# Patient Record
Sex: FEMALE | Race: WHITE | NOT HISPANIC OR LATINO | Employment: OTHER | URBAN - METROPOLITAN AREA
[De-identification: names, ages, dates, MRNs, and addresses within clinical notes are randomized per-mention and may not be internally consistent; named-entity substitution may affect disease eponyms.]

---

## 2023-03-18 LAB
ANION GAP IN SER/PLAS: 15 MMOL/L (ref 10–20)
CALCIUM (MG/DL) IN SER/PLAS: 10.1 MG/DL (ref 8.6–10.6)
CARBON DIOXIDE, TOTAL (MMOL/L) IN SER/PLAS: 28 MMOL/L (ref 21–32)
CHLORIDE (MMOL/L) IN SER/PLAS: 102 MMOL/L (ref 98–107)
CREATININE (MG/DL) IN SER/PLAS: 1.18 MG/DL (ref 0.5–1.05)
GFR FEMALE: 46 ML/MIN/1.73M2
GLUCOSE (MG/DL) IN SER/PLAS: 93 MG/DL (ref 74–99)
POTASSIUM (MMOL/L) IN SER/PLAS: 4.9 MMOL/L (ref 3.5–5.3)
SODIUM (MMOL/L) IN SER/PLAS: 140 MMOL/L (ref 136–145)
UREA NITROGEN (MG/DL) IN SER/PLAS: 18 MG/DL (ref 6–23)

## 2023-04-14 LAB
ALBUMIN (G/DL) IN SER/PLAS: 4.7 G/DL (ref 3.4–5)
ANION GAP IN SER/PLAS: 14 MMOL/L (ref 10–20)
BASOPHILS (10*3/UL) IN BLOOD BY AUTOMATED COUNT: 0.03 X10E9/L (ref 0–0.1)
BASOPHILS/100 LEUKOCYTES IN BLOOD BY AUTOMATED COUNT: 0.5 % (ref 0–2)
CALCIUM (MG/DL) IN SER/PLAS: 10.2 MG/DL (ref 8.6–10.6)
CARBON DIOXIDE, TOTAL (MMOL/L) IN SER/PLAS: 27 MMOL/L (ref 21–32)
CHLORIDE (MMOL/L) IN SER/PLAS: 105 MMOL/L (ref 98–107)
CREATININE (MG/DL) IN SER/PLAS: 1.18 MG/DL (ref 0.5–1.05)
EOSINOPHILS (10*3/UL) IN BLOOD BY AUTOMATED COUNT: 0.19 X10E9/L (ref 0–0.4)
EOSINOPHILS/100 LEUKOCYTES IN BLOOD BY AUTOMATED COUNT: 3.4 % (ref 0–6)
ERYTHROCYTE DISTRIBUTION WIDTH (RATIO) BY AUTOMATED COUNT: 13.2 % (ref 11.5–14.5)
ERYTHROCYTE MEAN CORPUSCULAR HEMOGLOBIN CONCENTRATION (G/DL) BY AUTOMATED: 31.4 G/DL (ref 32–36)
ERYTHROCYTE MEAN CORPUSCULAR VOLUME (FL) BY AUTOMATED COUNT: 93 FL (ref 80–100)
ERYTHROCYTES (10*6/UL) IN BLOOD BY AUTOMATED COUNT: 4.45 X10E12/L (ref 4–5.2)
GFR FEMALE: 46 ML/MIN/1.73M2
GLUCOSE (MG/DL) IN SER/PLAS: 100 MG/DL (ref 74–99)
HEMATOCRIT (%) IN BLOOD BY AUTOMATED COUNT: 41.4 % (ref 36–46)
HEMOGLOBIN (G/DL) IN BLOOD: 13 G/DL (ref 12–16)
IMMATURE GRANULOCYTES/100 LEUKOCYTES IN BLOOD BY AUTOMATED COUNT: 0.2 % (ref 0–0.9)
INR IN PPP BY COAGULATION ASSAY: 1 (ref 0.9–1.1)
LEUKOCYTES (10*3/UL) IN BLOOD BY AUTOMATED COUNT: 5.5 X10E9/L (ref 4.4–11.3)
LYMPHOCYTES (10*3/UL) IN BLOOD BY AUTOMATED COUNT: 1.48 X10E9/L (ref 0.8–3)
LYMPHOCYTES/100 LEUKOCYTES IN BLOOD BY AUTOMATED COUNT: 26.8 % (ref 13–44)
MONOCYTES (10*3/UL) IN BLOOD BY AUTOMATED COUNT: 0.39 X10E9/L (ref 0.05–0.8)
MONOCYTES/100 LEUKOCYTES IN BLOOD BY AUTOMATED COUNT: 7.1 % (ref 2–10)
NATRIURETIC PEPTIDE B (PG/ML) IN SER/PLAS: 42 PG/ML (ref 0–99)
NEUTROPHILS (10*3/UL) IN BLOOD BY AUTOMATED COUNT: 3.43 X10E9/L (ref 1.6–5.5)
NEUTROPHILS/100 LEUKOCYTES IN BLOOD BY AUTOMATED COUNT: 62 % (ref 40–80)
NRBC (PER 100 WBCS) BY AUTOMATED COUNT: 0 /100 WBC (ref 0–0)
PLATELETS (10*3/UL) IN BLOOD AUTOMATED COUNT: 340 X10E9/L (ref 150–450)
POTASSIUM (MMOL/L) IN SER/PLAS: 4.8 MMOL/L (ref 3.5–5.3)
PROTHROMBIN TIME (PT) IN PPP BY COAGULATION ASSAY: 11.3 SEC (ref 9.8–13.4)
SODIUM (MMOL/L) IN SER/PLAS: 141 MMOL/L (ref 136–145)
UREA NITROGEN (MG/DL) IN SER/PLAS: 15 MG/DL (ref 6–23)

## 2023-08-07 ENCOUNTER — OFFICE VISIT (OUTPATIENT)
Dept: PRIMARY CARE | Facility: CLINIC | Age: 83
End: 2023-08-07
Payer: MEDICARE

## 2023-08-07 VITALS
WEIGHT: 197 LBS | DIASTOLIC BLOOD PRESSURE: 66 MMHG | HEART RATE: 77 BPM | BODY MASS INDEX: 33.81 KG/M2 | OXYGEN SATURATION: 97 % | SYSTOLIC BLOOD PRESSURE: 120 MMHG

## 2023-08-07 DIAGNOSIS — I20.9 ANGINA PECTORIS (CMS-HCC): ICD-10-CM

## 2023-08-07 DIAGNOSIS — R10.30 LOWER ABDOMINAL PAIN: ICD-10-CM

## 2023-08-07 DIAGNOSIS — M35.9 CONNECTIVE TISSUE DISEASE (MULTI): ICD-10-CM

## 2023-08-07 DIAGNOSIS — K57.32 DIVERTICULITIS OF LARGE INTESTINE WITHOUT PERFORATION OR ABSCESS WITHOUT BLEEDING: Primary | ICD-10-CM

## 2023-08-07 PROBLEM — R10.13 CHRONIC EPIGASTRIC PAIN: Status: ACTIVE | Noted: 2023-08-07

## 2023-08-07 PROBLEM — I10 BENIGN ESSENTIAL HYPERTENSION: Status: ACTIVE | Noted: 2023-08-07

## 2023-08-07 PROBLEM — I35.0 AORTIC STENOSIS: Status: ACTIVE | Noted: 2023-08-07

## 2023-08-07 PROBLEM — I25.10 CAD (CORONARY ARTERY DISEASE): Status: ACTIVE | Noted: 2023-08-07

## 2023-08-07 PROBLEM — F41.1 GENERALIZED ANXIETY DISORDER: Status: ACTIVE | Noted: 2023-08-07

## 2023-08-07 PROBLEM — G51.31 HEMIFACIAL SPASM OF RIGHT SIDE OF FACE: Status: ACTIVE | Noted: 2023-06-29

## 2023-08-07 PROBLEM — K21.9 ESOPHAGEAL REFLUX: Status: ACTIVE | Noted: 2023-08-07

## 2023-08-07 PROBLEM — G89.29 CHRONIC EPIGASTRIC PAIN: Status: ACTIVE | Noted: 2023-08-07

## 2023-08-07 PROBLEM — K59.09 CHRONIC CONSTIPATION: Status: ACTIVE | Noted: 2023-08-07

## 2023-08-07 PROBLEM — M47.22 CERVICAL RADICULOPATHY DUE TO OSTEOARTHRITIS OF SPINE: Status: ACTIVE | Noted: 2023-08-07

## 2023-08-07 LAB
POC APPEARANCE, URINE: CLEAR
POC BILIRUBIN, URINE: NEGATIVE
POC BLOOD, URINE: NEGATIVE
POC COLOR, URINE: YELLOW
POC GLUCOSE, URINE: NEGATIVE MG/DL
POC KETONES, URINE: NEGATIVE MG/DL
POC LEUKOCYTES, URINE: NEGATIVE
POC NITRITE,URINE: NEGATIVE
POC PH, URINE: 6.5 PH
POC PROTEIN, URINE: NEGATIVE MG/DL
POC SPECIFIC GRAVITY, URINE: 1.02
POC UROBILINOGEN, URINE: 0.2 EU/DL

## 2023-08-07 PROCEDURE — 99214 OFFICE O/P EST MOD 30 MIN: CPT | Performed by: FAMILY MEDICINE

## 2023-08-07 PROCEDURE — 1036F TOBACCO NON-USER: CPT | Performed by: FAMILY MEDICINE

## 2023-08-07 PROCEDURE — 81003 URINALYSIS AUTO W/O SCOPE: CPT | Performed by: FAMILY MEDICINE

## 2023-08-07 PROCEDURE — 1160F RVW MEDS BY RX/DR IN RCRD: CPT | Performed by: FAMILY MEDICINE

## 2023-08-07 PROCEDURE — 1159F MED LIST DOCD IN RCRD: CPT | Performed by: FAMILY MEDICINE

## 2023-08-07 PROCEDURE — 1126F AMNT PAIN NOTED NONE PRSNT: CPT | Performed by: FAMILY MEDICINE

## 2023-08-07 PROCEDURE — 3074F SYST BP LT 130 MM HG: CPT | Performed by: FAMILY MEDICINE

## 2023-08-07 PROCEDURE — 3078F DIAST BP <80 MM HG: CPT | Performed by: FAMILY MEDICINE

## 2023-08-07 RX ORDER — NITROGLYCERIN 0.4 MG/1
0.4 TABLET SUBLINGUAL EVERY 5 MIN PRN
COMMUNITY
Start: 2016-08-16

## 2023-08-07 RX ORDER — METOPROLOL SUCCINATE 25 MG/1
25 TABLET, EXTENDED RELEASE ORAL DAILY
COMMUNITY
Start: 2023-05-22 | End: 2024-03-08 | Stop reason: SDUPTHER

## 2023-08-07 RX ORDER — CLOPIDOGREL BISULFATE 75 MG/1
TABLET ORAL
COMMUNITY
Start: 2023-04-28 | End: 2024-01-10 | Stop reason: HOSPADM

## 2023-08-07 RX ORDER — LATANOPROST 50 UG/ML
SOLUTION/ DROPS OPHTHALMIC
COMMUNITY
Start: 2016-10-26

## 2023-08-07 RX ORDER — ASPIRIN 81 MG/1
1 TABLET ORAL DAILY
COMMUNITY
Start: 2016-08-17 | End: 2024-01-15 | Stop reason: HOSPADM

## 2023-08-07 RX ORDER — EZETIMIBE 10 MG/1
10 TABLET ORAL NIGHTLY
COMMUNITY
Start: 2023-05-22 | End: 2024-02-16 | Stop reason: SDUPTHER

## 2023-08-07 RX ORDER — CHOLECALCIFEROL (VITAMIN D3) 25 MCG
1 TABLET ORAL DAILY
COMMUNITY

## 2023-08-07 RX ORDER — AMLODIPINE BESYLATE 2.5 MG/1
2.5 TABLET ORAL DAILY
COMMUNITY
Start: 2023-07-17 | End: 2023-12-11 | Stop reason: SDUPTHER

## 2023-08-07 RX ORDER — CIPROFLOXACIN 500 MG/1
500 TABLET ORAL 2 TIMES DAILY
Qty: 14 TABLET | Refills: 0 | Status: SHIPPED | OUTPATIENT
Start: 2023-08-07 | End: 2023-08-14

## 2023-08-07 RX ORDER — ROSUVASTATIN CALCIUM 5 MG/1
5 TABLET, COATED ORAL DAILY
COMMUNITY
Start: 2023-06-11 | End: 2024-03-15 | Stop reason: SDUPTHER

## 2023-08-07 RX ORDER — RANOLAZINE 500 MG/1
500 TABLET, EXTENDED RELEASE ORAL EVERY 12 HOURS
COMMUNITY
Start: 2023-07-23 | End: 2024-01-10 | Stop reason: HOSPADM

## 2023-08-07 ASSESSMENT — ENCOUNTER SYMPTOMS
UNEXPECTED WEIGHT CHANGE: 0
APPETITE CHANGE: 0
ABDOMINAL PAIN: 1
NAUSEA: 0

## 2023-08-28 ENCOUNTER — OFFICE VISIT (OUTPATIENT)
Dept: PRIMARY CARE | Facility: CLINIC | Age: 83
End: 2023-08-28
Payer: MEDICARE

## 2023-08-28 VITALS
WEIGHT: 189 LBS | OXYGEN SATURATION: 97 % | SYSTOLIC BLOOD PRESSURE: 136 MMHG | TEMPERATURE: 99.3 F | BODY MASS INDEX: 32.44 KG/M2 | DIASTOLIC BLOOD PRESSURE: 70 MMHG | HEART RATE: 91 BPM

## 2023-08-28 DIAGNOSIS — J01.00 ACUTE MAXILLARY SINUSITIS, RECURRENCE NOT SPECIFIED: ICD-10-CM

## 2023-08-28 DIAGNOSIS — I10 BENIGN ESSENTIAL HYPERTENSION: Primary | ICD-10-CM

## 2023-08-28 PROBLEM — H93.13 TINNITUS OF BOTH EARS: Status: ACTIVE | Noted: 2023-08-28

## 2023-08-28 PROBLEM — K52.832 LYMPHOCYTIC COLITIS: Status: ACTIVE | Noted: 2023-08-28

## 2023-08-28 PROBLEM — E78.2 COMBINED HYPERLIPIDEMIA: Status: ACTIVE | Noted: 2023-08-28

## 2023-08-28 PROCEDURE — 3078F DIAST BP <80 MM HG: CPT | Performed by: FAMILY MEDICINE

## 2023-08-28 PROCEDURE — 1036F TOBACCO NON-USER: CPT | Performed by: FAMILY MEDICINE

## 2023-08-28 PROCEDURE — 1160F RVW MEDS BY RX/DR IN RCRD: CPT | Performed by: FAMILY MEDICINE

## 2023-08-28 PROCEDURE — 1126F AMNT PAIN NOTED NONE PRSNT: CPT | Performed by: FAMILY MEDICINE

## 2023-08-28 PROCEDURE — 99213 OFFICE O/P EST LOW 20 MIN: CPT | Performed by: FAMILY MEDICINE

## 2023-08-28 PROCEDURE — 1159F MED LIST DOCD IN RCRD: CPT | Performed by: FAMILY MEDICINE

## 2023-08-28 PROCEDURE — 3075F SYST BP GE 130 - 139MM HG: CPT | Performed by: FAMILY MEDICINE

## 2023-08-28 RX ORDER — AZITHROMYCIN 250 MG/1
TABLET, FILM COATED ORAL
Qty: 6 TABLET | Refills: 0 | Status: SHIPPED | OUTPATIENT
Start: 2023-08-28 | End: 2023-09-01

## 2023-08-28 ASSESSMENT — ENCOUNTER SYMPTOMS
NAUSEA: 0
APPETITE CHANGE: 0
UNEXPECTED WEIGHT CHANGE: 0

## 2023-08-28 NOTE — PROGRESS NOTES
Subjective   Patient ID: Shanika Diaz is a 83 y.o. female who presents for Illness (Started Thurs with earaches, then ST and cough, just getting worse, chilled and then hot, taking Tylenol without relief cough is dry makes her feel like she is going to get sick to her stomach ).    Illness  Pertinent negatives include no nausea.   Sinus pressure ,drainage, sore throat ,dry cough ,no fever chills ,sick 5 days and not getting better    Review of Systems   Constitutional:  Negative for appetite change and unexpected weight change.   Eyes:  Negative for visual disturbance.   Gastrointestinal:  Negative for nausea.       Objective   /70   Pulse 91   Temp 37.4 °C (99.3 °F)   Wt 85.7 kg (189 lb)   SpO2 97%   BMI 32.44 kg/m²     Physical Exam  HENT:      Head: Normocephalic and atraumatic.      Nose: Nose normal.      Mouth/Throat:      Mouth: Mucous membranes are moist.      Pharynx: No oropharyngeal exudate.   Eyes:      Extraocular Movements: Extraocular movements intact.      Conjunctiva/sclera: Conjunctivae normal.      Pupils: Pupils are equal, round, and reactive to light.   Cardiovascular:      Rate and Rhythm: Normal rate and regular rhythm.   Pulmonary:      Effort: Pulmonary effort is normal.   Abdominal:      General: There is no distension.      Palpations: Abdomen is soft.   Musculoskeletal:      Cervical back: Normal range of motion and neck supple.   Lymphadenopathy:      Cervical: No cervical adenopathy.   Neurological:      General: No focal deficit present.      Mental Status: She is alert.   Psychiatric:         Attention and Perception: Attention normal.         Speech: Speech normal.         Behavior: Behavior is cooperative.         Assessment/Plan   Diagnoses and all orders for this visit:  Benign essential hypertension  Comments:  Treated and controlled  Acute maxillary sinusitis, recurrence not specified  -     azithromycin (Zithromax) 250 mg tablet; Take 2 tablets (500 mg) by mouth  once daily for 1 day, THEN 1 tablet (250 mg) once daily for 4 days.     is present  Recommend Mucinex twice daily for 5 days as well  Recheck 1 week if not better sooner if worse

## 2023-08-31 ENCOUNTER — TELEPHONE (OUTPATIENT)
Dept: PRIMARY CARE | Facility: CLINIC | Age: 83
End: 2023-08-31
Payer: MEDICARE

## 2023-08-31 NOTE — TELEPHONE ENCOUNTER
Pt was seen on 8/28 pt states she does not thing the Zpac is doing any good. Pt states the pain has spread to the ears neck and head. After taking mucinex the pain seems to get worse. Pt is asking what she should do next. Please advise [phone] Thai / Rod Dietz.

## 2023-08-31 NOTE — TELEPHONE ENCOUNTER
Pt. Notified encouraged rest and fluids also can use tylenol for pain and she has been Will keep us informed

## 2023-09-05 ENCOUNTER — TELEPHONE (OUTPATIENT)
Dept: PRIMARY CARE | Facility: CLINIC | Age: 83
End: 2023-09-05
Payer: MEDICARE

## 2023-09-05 DIAGNOSIS — H65.03 NON-RECURRENT ACUTE SEROUS OTITIS MEDIA OF BOTH EARS: Primary | ICD-10-CM

## 2023-09-05 RX ORDER — PREDNISONE 10 MG/1
TABLET ORAL
Qty: 14 TABLET | Refills: 0 | Status: SHIPPED | OUTPATIENT
Start: 2023-09-05 | End: 2023-09-18 | Stop reason: SDUPTHER

## 2023-09-05 NOTE — TELEPHONE ENCOUNTER
Pt states she was seen last week on the 28th and pt still has the blocked ears to the point where her equilibrium is off and she becomes sick to her stomach. Pt is asking if JEFF would like to see her again or can JEFF please send something in to pharmacy, [facility] [place]. Please advise pt. (Pt will be in with  at the 1045 time today)

## 2023-09-18 ENCOUNTER — TELEPHONE (OUTPATIENT)
Dept: PRIMARY CARE | Facility: CLINIC | Age: 83
End: 2023-09-18
Payer: MEDICARE

## 2023-09-18 DIAGNOSIS — H65.03 NON-RECURRENT ACUTE SEROUS OTITIS MEDIA OF BOTH EARS: ICD-10-CM

## 2023-09-18 NOTE — TELEPHONE ENCOUNTER
Discussed with pt. And she feels she needs something this has been dragging on for so long. Please send the prednisone to [facility] in [place]

## 2023-09-18 NOTE — TELEPHONE ENCOUNTER
PT states she still has blocked ears, congestion, dizziness, and a light cough. PT was advised to call if symptoms didn't get better by today. PT requesting a call back.

## 2023-09-19 RX ORDER — PREDNISONE 10 MG/1
TABLET ORAL
Qty: 14 TABLET | Refills: 0 | Status: SHIPPED | OUTPATIENT
Start: 2023-09-19 | End: 2023-09-26

## 2023-11-06 PROBLEM — E66.811 CLASS 1 OBESITY: Status: ACTIVE | Noted: 2019-09-10

## 2023-11-06 PROBLEM — R42 DIZZINESS: Status: ACTIVE | Noted: 2023-11-06

## 2023-11-06 PROBLEM — L85.1 ACQUIRED PLANTAR POROKERATOSIS: Status: ACTIVE | Noted: 2023-11-06

## 2023-11-06 PROBLEM — J01.00 ACUTE NON-RECURRENT MAXILLARY SINUSITIS: Status: ACTIVE | Noted: 2023-11-06

## 2023-11-06 PROBLEM — M79.604 BILATERAL LEG AND FOOT PAIN: Status: ACTIVE | Noted: 2023-11-06

## 2023-11-06 PROBLEM — R07.9 CHEST PAIN: Status: ACTIVE | Noted: 2023-11-06

## 2023-11-06 PROBLEM — R52 PAIN: Status: ACTIVE | Noted: 2023-11-06

## 2023-11-06 PROBLEM — M79.671 BILATERAL LEG AND FOOT PAIN: Status: ACTIVE | Noted: 2023-11-06

## 2023-11-06 PROBLEM — M79.605 BILATERAL LEG AND FOOT PAIN: Status: ACTIVE | Noted: 2023-11-06

## 2023-11-06 PROBLEM — E66.9 CLASS 1 OBESITY: Status: ACTIVE | Noted: 2019-09-10

## 2023-11-06 PROBLEM — R11.0 MODERATE NAUSEA: Status: ACTIVE | Noted: 2023-11-06

## 2023-11-06 PROBLEM — M19.079 ARTHRITIS OF FOOT: Status: ACTIVE | Noted: 2023-11-06

## 2023-11-06 PROBLEM — M79.672 BILATERAL LEG AND FOOT PAIN: Status: ACTIVE | Noted: 2023-11-06

## 2023-11-06 PROBLEM — R09.89 CAROTID BRUIT: Status: ACTIVE | Noted: 2023-11-06

## 2023-11-06 PROBLEM — E78.5 HYPERLIPIDEMIA: Status: ACTIVE | Noted: 2023-11-06

## 2023-11-06 RX ORDER — DULOXETIN HYDROCHLORIDE 20 MG/1
20 CAPSULE, DELAYED RELEASE ORAL
Status: ON HOLD | COMMUNITY
End: 2023-12-16 | Stop reason: ALTCHOICE

## 2023-11-06 RX ORDER — ESCITALOPRAM OXALATE 5 MG/1
5 TABLET ORAL
COMMUNITY
End: 2023-12-16 | Stop reason: ALTCHOICE

## 2023-11-06 RX ORDER — PAROXETINE 10 MG/1
10 TABLET, FILM COATED ORAL
COMMUNITY
End: 2023-12-11 | Stop reason: WASHOUT

## 2023-11-06 RX ORDER — HYOSCYAMINE SULFATE 0.12 MG/1
0.12 TABLET, ORALLY DISINTEGRATING ORAL AS NEEDED
COMMUNITY
End: 2023-12-11 | Stop reason: WASHOUT

## 2023-11-06 RX ORDER — BIMATOPROST 0.1 MG/ML
SOLUTION/ DROPS OPHTHALMIC
Status: ON HOLD | COMMUNITY
Start: 2013-10-16 | End: 2023-12-16 | Stop reason: ALTCHOICE

## 2023-11-06 RX ORDER — AMLODIPINE, VALSARTAN AND HYDROCHLOROTHIAZIDE 10; 160; 12.5 MG/1; MG/1; MG/1
1 TABLET ORAL
COMMUNITY
End: 2023-12-11 | Stop reason: WASHOUT

## 2023-11-06 RX ORDER — PANTOPRAZOLE SODIUM 40 MG/1
40 TABLET, DELAYED RELEASE ORAL DAILY
COMMUNITY
Start: 2023-03-10 | End: 2023-12-11 | Stop reason: WASHOUT

## 2023-11-06 RX ORDER — HYDROCORTISONE 1 %
1 CREAM (GRAM) TOPICAL 2 TIMES DAILY
COMMUNITY
Start: 2015-12-29 | End: 2023-12-11 | Stop reason: WASHOUT

## 2023-11-06 RX ORDER — EPINEPHRINE 0.22MG
200 AEROSOL WITH ADAPTER (ML) INHALATION 2 TIMES DAILY
COMMUNITY
End: 2023-12-16 | Stop reason: ALTCHOICE

## 2023-11-06 RX ORDER — SERTRALINE HYDROCHLORIDE 25 MG/1
25 TABLET, FILM COATED ORAL
COMMUNITY
End: 2023-12-11 | Stop reason: WASHOUT

## 2023-11-06 RX ORDER — ATORVASTATIN CALCIUM 10 MG/1
10 TABLET, FILM COATED ORAL
COMMUNITY
End: 2023-12-16 | Stop reason: ALTCHOICE

## 2023-11-06 RX ORDER — OMEPRAZOLE 20 MG/1
CAPSULE, DELAYED RELEASE ORAL
Status: ON HOLD | COMMUNITY
Start: 2015-12-22 | End: 2023-12-16 | Stop reason: ALTCHOICE

## 2023-11-08 ENCOUNTER — OFFICE VISIT (OUTPATIENT)
Dept: PODIATRY | Facility: CLINIC | Age: 83
End: 2023-11-08
Payer: MEDICARE

## 2023-11-08 DIAGNOSIS — M79.672 FOOT PAIN, LEFT: ICD-10-CM

## 2023-11-08 DIAGNOSIS — L84 PRE-ULCERATIVE CALLUSES: ICD-10-CM

## 2023-11-08 DIAGNOSIS — L85.1 ACQUIRED PLANTAR POROKERATOSIS: Primary | ICD-10-CM

## 2023-11-08 PROCEDURE — 3075F SYST BP GE 130 - 139MM HG: CPT | Performed by: PODIATRIST

## 2023-11-08 PROCEDURE — 99213 OFFICE O/P EST LOW 20 MIN: CPT | Performed by: PODIATRIST

## 2023-11-08 PROCEDURE — 1036F TOBACCO NON-USER: CPT | Performed by: PODIATRIST

## 2023-11-08 PROCEDURE — 3078F DIAST BP <80 MM HG: CPT | Performed by: PODIATRIST

## 2023-11-08 PROCEDURE — 1160F RVW MEDS BY RX/DR IN RCRD: CPT | Performed by: PODIATRIST

## 2023-11-08 PROCEDURE — 1159F MED LIST DOCD IN RCRD: CPT | Performed by: PODIATRIST

## 2023-11-08 PROCEDURE — 1126F AMNT PAIN NOTED NONE PRSNT: CPT | Performed by: PODIATRIST

## 2023-11-08 NOTE — PROGRESS NOTES
This is a 83 y.o. female est  patient for foot pain    History of Present Illness:   Patient states the left foot is tender  Has a skin growth  Tender when walking  NO other pedal complaints        Past Medical History  Past Medical History:   Diagnosis Date    Atherosclerotic heart disease of native coronary artery without angina pectoris     Coronary artery disease without angina pectoris, unspecified vessel or lesion type, unspecified whether native or transplanted heart    Personal history of other diseases of the respiratory system 02/02/2019    History of sore throat    Personal history of other specified conditions     History of chest pain       Medications and Allergies have been reviewed.    Review Of Systems:  GENERAL: No weight loss, malaise or fevers.  HEENT: Negative for frequent or significant headaches,   RESPIRATORY: Negative for cough, wheezing or shortness of breath.  CARDIOVASCULAR: Negative for chest pain, leg swelling or palpitations.    Physical Exam:  Patient is a pleasant, cooperative, well developed 83 y.o.  adult female. The patient is alert and oriented to time, place and person.   Patient has normal affect and mood.    Examination of Both Lower Extremities:   Objective:   Vasc: DP and PT pulses are palpable bilateral.  CFT is less than 3 seconds bilateral.  Skin temperature is warm to cool proximal to distal bilateral.  Varicosities noted.     Neuro: Vibratory, light touch and proprioception are intact bilateral.        Derm: Nails 1-5 bilateral are intact HPK to left plantar hallux IPJ Debrided thick skin    Ortho: Muscle strength is 5/5 for all pedal groups tested.     A comprehensive history and physical exam was performed. The patient was educated on clinical and radiographic findings, diagnosis, and treatment plans.    1. Acquired plantar porokeratosis        2. Pre-ulcerative calluses        3. Foot pain, left            Patient exam and eval  Debrided hpk tissue to smooth  skin  Recommend callous cushion  Topical aquaphor to area  Fu prn  Patient was in agreement to this plan. All questions answered.      Abril Draper DPM  [phone]  [facility]  Fax: [phone]

## 2023-12-11 ENCOUNTER — OFFICE VISIT (OUTPATIENT)
Dept: CARDIOLOGY | Facility: CLINIC | Age: 83
End: 2023-12-11
Payer: MEDICARE

## 2023-12-11 VITALS
HEART RATE: 82 BPM | OXYGEN SATURATION: 97 % | SYSTOLIC BLOOD PRESSURE: 133 MMHG | DIASTOLIC BLOOD PRESSURE: 71 MMHG | WEIGHT: 190 LBS | BODY MASS INDEX: 32.61 KG/M2

## 2023-12-11 DIAGNOSIS — I35.0 NONRHEUMATIC AORTIC VALVE STENOSIS: ICD-10-CM

## 2023-12-11 DIAGNOSIS — R53.83 OTHER FATIGUE: ICD-10-CM

## 2023-12-11 DIAGNOSIS — I25.10 CORONARY ARTERY DISEASE, UNSPECIFIED VESSEL OR LESION TYPE, UNSPECIFIED WHETHER ANGINA PRESENT, UNSPECIFIED WHETHER NATIVE OR TRANSPLANTED HEART: Primary | ICD-10-CM

## 2023-12-11 DIAGNOSIS — I10 BENIGN ESSENTIAL HYPERTENSION: ICD-10-CM

## 2023-12-11 PROCEDURE — 1036F TOBACCO NON-USER: CPT | Performed by: NURSE PRACTITIONER

## 2023-12-11 PROCEDURE — 99214 OFFICE O/P EST MOD 30 MIN: CPT | Performed by: NURSE PRACTITIONER

## 2023-12-11 PROCEDURE — 99214 OFFICE O/P EST MOD 30 MIN: CPT | Mod: 25 | Performed by: NURSE PRACTITIONER

## 2023-12-11 PROCEDURE — 3075F SYST BP GE 130 - 139MM HG: CPT | Performed by: NURSE PRACTITIONER

## 2023-12-11 PROCEDURE — 1159F MED LIST DOCD IN RCRD: CPT | Performed by: NURSE PRACTITIONER

## 2023-12-11 PROCEDURE — 93010 ELECTROCARDIOGRAM REPORT: CPT | Performed by: INTERNAL MEDICINE

## 2023-12-11 PROCEDURE — 1160F RVW MEDS BY RX/DR IN RCRD: CPT | Performed by: NURSE PRACTITIONER

## 2023-12-11 PROCEDURE — 3078F DIAST BP <80 MM HG: CPT | Performed by: NURSE PRACTITIONER

## 2023-12-11 PROCEDURE — 1126F AMNT PAIN NOTED NONE PRSNT: CPT | Performed by: NURSE PRACTITIONER

## 2023-12-11 PROCEDURE — 93005 ELECTROCARDIOGRAM TRACING: CPT | Mod: PO | Performed by: NURSE PRACTITIONER

## 2023-12-11 RX ORDER — AMLODIPINE BESYLATE 5 MG/1
5 TABLET ORAL DAILY
Start: 2023-12-11 | End: 2024-01-10 | Stop reason: HOSPADM

## 2023-12-11 ASSESSMENT — PAIN SCALES - GENERAL: PAINLEVEL: 0-NO PAIN

## 2023-12-16 ENCOUNTER — APPOINTMENT (OUTPATIENT)
Dept: RADIOLOGY | Facility: HOSPITAL | Age: 83
DRG: 392 | End: 2023-12-16
Payer: MEDICARE

## 2023-12-16 ENCOUNTER — OFFICE VISIT (OUTPATIENT)
Dept: PRIMARY CARE | Facility: CLINIC | Age: 83
End: 2023-12-16
Payer: MEDICARE

## 2023-12-16 ENCOUNTER — HOSPITAL ENCOUNTER (INPATIENT)
Facility: HOSPITAL | Age: 83
LOS: 3 days | Discharge: HOME | DRG: 392 | End: 2023-12-19
Attending: EMERGENCY MEDICINE | Admitting: INTERNAL MEDICINE
Payer: MEDICARE

## 2023-12-16 VITALS
HEIGHT: 64 IN | DIASTOLIC BLOOD PRESSURE: 64 MMHG | SYSTOLIC BLOOD PRESSURE: 130 MMHG | OXYGEN SATURATION: 94 % | WEIGHT: 187 LBS | BODY MASS INDEX: 31.92 KG/M2 | HEART RATE: 80 BPM | TEMPERATURE: 97.8 F

## 2023-12-16 DIAGNOSIS — K57.92 ACUTE DIVERTICULITIS: Primary | ICD-10-CM

## 2023-12-16 DIAGNOSIS — R10.13 EPIGASTRIC PAIN: Primary | ICD-10-CM

## 2023-12-16 DIAGNOSIS — R10.13 EPIGASTRIC PAIN: ICD-10-CM

## 2023-12-16 DIAGNOSIS — R39.15 URINARY URGENCY: ICD-10-CM

## 2023-12-16 LAB
ALBUMIN SERPL BCP-MCNC: 4.2 G/DL (ref 3.4–5)
ALP SERPL-CCNC: 110 U/L (ref 33–136)
ALT SERPL W P-5'-P-CCNC: 19 U/L (ref 7–45)
ANION GAP SERPL CALC-SCNC: 14 MMOL/L (ref 10–20)
APPEARANCE UR: ABNORMAL
AST SERPL W P-5'-P-CCNC: 16 U/L (ref 9–39)
BACTERIA #/AREA URNS AUTO: ABNORMAL /HPF
BASOPHILS # BLD AUTO: 0.04 X10*3/UL (ref 0–0.1)
BASOPHILS NFR BLD AUTO: 0.4 %
BILIRUB SERPL-MCNC: 0.5 MG/DL (ref 0–1.2)
BILIRUB UR STRIP.AUTO-MCNC: NEGATIVE MG/DL
BUN SERPL-MCNC: 15 MG/DL (ref 6–23)
CALCIUM SERPL-MCNC: 9.7 MG/DL (ref 8.6–10.3)
CHLORIDE SERPL-SCNC: 100 MMOL/L (ref 98–107)
CO2 SERPL-SCNC: 25 MMOL/L (ref 21–32)
COLOR UR: YELLOW
CREAT SERPL-MCNC: 1.03 MG/DL (ref 0.5–1.05)
EOSINOPHIL # BLD AUTO: 0.18 X10*3/UL (ref 0–0.4)
EOSINOPHIL NFR BLD AUTO: 1.9 %
ERYTHROCYTE [DISTWIDTH] IN BLOOD BY AUTOMATED COUNT: 12.7 % (ref 11.5–14.5)
GFR SERPL CREATININE-BSD FRML MDRD: 54 ML/MIN/1.73M*2
GLUCOSE SERPL-MCNC: 83 MG/DL (ref 74–99)
GLUCOSE UR STRIP.AUTO-MCNC: NEGATIVE MG/DL
HCT VFR BLD AUTO: 40 % (ref 36–46)
HGB BLD-MCNC: 12.6 G/DL (ref 12–16)
HOLD SPECIMEN: NORMAL
HYALINE CASTS #/AREA URNS AUTO: ABNORMAL /LPF
IMM GRANULOCYTES # BLD AUTO: 0.02 X10*3/UL (ref 0–0.5)
IMM GRANULOCYTES NFR BLD AUTO: 0.2 % (ref 0–0.9)
KETONES UR STRIP.AUTO-MCNC: ABNORMAL MG/DL
LACTATE SERPL-SCNC: 0.9 MMOL/L (ref 0.4–2)
LEUKOCYTE ESTERASE UR QL STRIP.AUTO: ABNORMAL
LYMPHOCYTES # BLD AUTO: 1.93 X10*3/UL (ref 0.8–3)
LYMPHOCYTES NFR BLD AUTO: 20.8 %
MCH RBC QN AUTO: 29.2 PG (ref 26–34)
MCHC RBC AUTO-ENTMCNC: 31.5 G/DL (ref 32–36)
MCV RBC AUTO: 93 FL (ref 80–100)
MONOCYTES # BLD AUTO: 0.72 X10*3/UL (ref 0.05–0.8)
MONOCYTES NFR BLD AUTO: 7.8 %
MUCOUS THREADS #/AREA URNS AUTO: ABNORMAL /LPF
NEUTROPHILS # BLD AUTO: 6.38 X10*3/UL (ref 1.6–5.5)
NEUTROPHILS NFR BLD AUTO: 68.9 %
NITRITE UR QL STRIP.AUTO: NEGATIVE
NRBC BLD-RTO: 0 /100 WBCS (ref 0–0)
PH UR STRIP.AUTO: 5 [PH]
PLATELET # BLD AUTO: 314 X10*3/UL (ref 150–450)
POC APPEARANCE, URINE: CLEAR
POC BILIRUBIN, URINE: ABNORMAL
POC BLOOD, URINE: NEGATIVE
POC COLOR, URINE: ABNORMAL
POC GLUCOSE, URINE: NEGATIVE MG/DL
POC KETONES, URINE: NEGATIVE MG/DL
POC LEUKOCYTES, URINE: NEGATIVE
POC NITRITE,URINE: NEGATIVE
POC PH, URINE: 5.5 PH
POC PROTEIN, URINE: ABNORMAL MG/DL
POC SPECIFIC GRAVITY, URINE: >=1.03
POC UROBILINOGEN, URINE: 1 EU/DL
POTASSIUM SERPL-SCNC: 4.1 MMOL/L (ref 3.5–5.3)
PROT SERPL-MCNC: 7.1 G/DL (ref 6.4–8.2)
PROT UR STRIP.AUTO-MCNC: NEGATIVE MG/DL
RBC # BLD AUTO: 4.31 X10*6/UL (ref 4–5.2)
RBC # UR STRIP.AUTO: NEGATIVE /UL
RBC #/AREA URNS AUTO: ABNORMAL /HPF
SODIUM SERPL-SCNC: 135 MMOL/L (ref 136–145)
SP GR UR STRIP.AUTO: 1.01
SQUAMOUS #/AREA URNS AUTO: ABNORMAL /HPF
TRANS CELLS #/AREA UR COMP ASSIST: ABNORMAL /HPF
UROBILINOGEN UR STRIP.AUTO-MCNC: <2 MG/DL
WBC # BLD AUTO: 9.3 X10*3/UL (ref 4.4–11.3)
WBC #/AREA URNS AUTO: >50 /HPF
WBC CLUMPS #/AREA URNS AUTO: ABNORMAL /HPF

## 2023-12-16 PROCEDURE — 3078F DIAST BP <80 MM HG: CPT | Performed by: FAMILY MEDICINE

## 2023-12-16 PROCEDURE — 99214 OFFICE O/P EST MOD 30 MIN: CPT | Performed by: FAMILY MEDICINE

## 2023-12-16 PROCEDURE — 2500000004 HC RX 250 GENERAL PHARMACY W/ HCPCS (ALT 636 FOR OP/ED)

## 2023-12-16 PROCEDURE — 96372 THER/PROPH/DIAG INJ SC/IM: CPT

## 2023-12-16 PROCEDURE — 1036F TOBACCO NON-USER: CPT | Performed by: FAMILY MEDICINE

## 2023-12-16 PROCEDURE — 74177 CT ABD & PELVIS W/CONTRAST: CPT

## 2023-12-16 PROCEDURE — 36415 COLL VENOUS BLD VENIPUNCTURE: CPT | Performed by: EMERGENCY MEDICINE

## 2023-12-16 PROCEDURE — 2500000002 HC RX 250 W HCPCS SELF ADMINISTERED DRUGS (ALT 637 FOR MEDICARE OP, ALT 636 FOR OP/ED)

## 2023-12-16 PROCEDURE — 1159F MED LIST DOCD IN RCRD: CPT | Performed by: FAMILY MEDICINE

## 2023-12-16 PROCEDURE — 87086 URINE CULTURE/COLONY COUNT: CPT | Mod: GEALAB | Performed by: EMERGENCY MEDICINE

## 2023-12-16 PROCEDURE — 1126F AMNT PAIN NOTED NONE PRSNT: CPT | Performed by: FAMILY MEDICINE

## 2023-12-16 PROCEDURE — 2500000001 HC RX 250 WO HCPCS SELF ADMINISTERED DRUGS (ALT 637 FOR MEDICARE OP)

## 2023-12-16 PROCEDURE — 99285 EMERGENCY DEPT VISIT HI MDM: CPT | Performed by: EMERGENCY MEDICINE

## 2023-12-16 PROCEDURE — 87086 URINE CULTURE/COLONY COUNT: CPT

## 2023-12-16 PROCEDURE — 81001 URINALYSIS AUTO W/SCOPE: CPT | Performed by: EMERGENCY MEDICINE

## 2023-12-16 PROCEDURE — 2500000004 HC RX 250 GENERAL PHARMACY W/ HCPCS (ALT 636 FOR OP/ED): Performed by: INTERNAL MEDICINE

## 2023-12-16 PROCEDURE — 81003 URINALYSIS AUTO W/O SCOPE: CPT | Performed by: FAMILY MEDICINE

## 2023-12-16 PROCEDURE — 2500000004 HC RX 250 GENERAL PHARMACY W/ HCPCS (ALT 636 FOR OP/ED): Performed by: EMERGENCY MEDICINE

## 2023-12-16 PROCEDURE — 96361 HYDRATE IV INFUSION ADD-ON: CPT

## 2023-12-16 PROCEDURE — 80053 COMPREHEN METABOLIC PANEL: CPT | Performed by: EMERGENCY MEDICINE

## 2023-12-16 PROCEDURE — 96374 THER/PROPH/DIAG INJ IV PUSH: CPT | Mod: 59

## 2023-12-16 PROCEDURE — 1100000001 HC PRIVATE ROOM DAILY

## 2023-12-16 PROCEDURE — 83605 ASSAY OF LACTIC ACID: CPT | Performed by: EMERGENCY MEDICINE

## 2023-12-16 PROCEDURE — 2550000001 HC RX 255 CONTRASTS: Performed by: EMERGENCY MEDICINE

## 2023-12-16 PROCEDURE — 85025 COMPLETE CBC W/AUTO DIFF WBC: CPT | Performed by: EMERGENCY MEDICINE

## 2023-12-16 PROCEDURE — 3075F SYST BP GE 130 - 139MM HG: CPT | Performed by: FAMILY MEDICINE

## 2023-12-16 PROCEDURE — 1160F RVW MEDS BY RX/DR IN RCRD: CPT | Performed by: FAMILY MEDICINE

## 2023-12-16 RX ORDER — METRONIDAZOLE 500 MG/100ML
500 INJECTION, SOLUTION INTRAVENOUS ONCE
Status: DISCONTINUED | OUTPATIENT
Start: 2023-12-16 | End: 2023-12-19 | Stop reason: HOSPADM

## 2023-12-16 RX ORDER — ACETAMINOPHEN 325 MG/1
975 TABLET ORAL EVERY 6 HOURS PRN
Status: DISCONTINUED | OUTPATIENT
Start: 2023-12-16 | End: 2023-12-19 | Stop reason: HOSPADM

## 2023-12-16 RX ORDER — AMLODIPINE BESYLATE 5 MG/1
5 TABLET ORAL DAILY
Status: DISCONTINUED | OUTPATIENT
Start: 2023-12-16 | End: 2023-12-19 | Stop reason: HOSPADM

## 2023-12-16 RX ORDER — OXYCODONE HYDROCHLORIDE 5 MG/1
5 TABLET ORAL EVERY 6 HOURS PRN
Status: DISCONTINUED | OUTPATIENT
Start: 2023-12-16 | End: 2023-12-19 | Stop reason: HOSPADM

## 2023-12-16 RX ORDER — METOPROLOL SUCCINATE 25 MG/1
25 TABLET, EXTENDED RELEASE ORAL DAILY
Status: DISCONTINUED | OUTPATIENT
Start: 2023-12-16 | End: 2023-12-19 | Stop reason: HOSPADM

## 2023-12-16 RX ORDER — NITROGLYCERIN 0.4 MG/1
0.4 TABLET SUBLINGUAL EVERY 5 MIN PRN
Status: DISCONTINUED | OUTPATIENT
Start: 2023-12-16 | End: 2023-12-19 | Stop reason: HOSPADM

## 2023-12-16 RX ORDER — PANTOPRAZOLE SODIUM 40 MG/1
40 TABLET, DELAYED RELEASE ORAL
Status: DISCONTINUED | OUTPATIENT
Start: 2023-12-17 | End: 2023-12-19 | Stop reason: HOSPADM

## 2023-12-16 RX ORDER — METRONIDAZOLE 500 MG/100ML
500 INJECTION, SOLUTION INTRAVENOUS EVERY 8 HOURS
Status: DISCONTINUED | OUTPATIENT
Start: 2023-12-16 | End: 2023-12-19 | Stop reason: HOSPADM

## 2023-12-16 RX ORDER — CIPROFLOXACIN 2 MG/ML
400 INJECTION, SOLUTION INTRAVENOUS EVERY 12 HOURS
Status: DISCONTINUED | OUTPATIENT
Start: 2023-12-16 | End: 2023-12-19 | Stop reason: HOSPADM

## 2023-12-16 RX ORDER — EZETIMIBE 10 MG/1
10 TABLET ORAL NIGHTLY
Status: DISCONTINUED | OUTPATIENT
Start: 2023-12-16 | End: 2023-12-19 | Stop reason: HOSPADM

## 2023-12-16 RX ORDER — RANOLAZINE 500 MG/1
500 TABLET, EXTENDED RELEASE ORAL EVERY 12 HOURS
Status: DISCONTINUED | OUTPATIENT
Start: 2023-12-16 | End: 2023-12-19 | Stop reason: HOSPADM

## 2023-12-16 RX ORDER — ENOXAPARIN SODIUM 100 MG/ML
40 INJECTION SUBCUTANEOUS EVERY 24 HOURS
Status: DISCONTINUED | OUTPATIENT
Start: 2023-12-16 | End: 2023-12-19 | Stop reason: HOSPADM

## 2023-12-16 RX ORDER — LATANOPROST 50 UG/ML
1 SOLUTION/ DROPS OPHTHALMIC NIGHTLY
Status: DISCONTINUED | OUTPATIENT
Start: 2023-12-16 | End: 2023-12-19 | Stop reason: HOSPADM

## 2023-12-16 RX ORDER — ONDANSETRON HYDROCHLORIDE 2 MG/ML
4 INJECTION, SOLUTION INTRAVENOUS EVERY 6 HOURS PRN
Status: DISCONTINUED | OUTPATIENT
Start: 2023-12-16 | End: 2023-12-19 | Stop reason: HOSPADM

## 2023-12-16 RX ORDER — CIPROFLOXACIN 2 MG/ML
400 INJECTION, SOLUTION INTRAVENOUS ONCE
Status: COMPLETED | OUTPATIENT
Start: 2023-12-16 | End: 2023-12-16

## 2023-12-16 RX ORDER — OXYCODONE HYDROCHLORIDE 5 MG/1
2.5 TABLET ORAL EVERY 6 HOURS PRN
Status: DISCONTINUED | OUTPATIENT
Start: 2023-12-16 | End: 2023-12-19 | Stop reason: HOSPADM

## 2023-12-16 RX ORDER — CLOPIDOGREL BISULFATE 75 MG/1
75 TABLET ORAL DAILY
Status: DISCONTINUED | OUTPATIENT
Start: 2023-12-16 | End: 2023-12-19 | Stop reason: HOSPADM

## 2023-12-16 RX ORDER — ROSUVASTATIN CALCIUM 10 MG/1
5 TABLET, COATED ORAL DAILY
Status: DISCONTINUED | OUTPATIENT
Start: 2023-12-16 | End: 2023-12-19 | Stop reason: HOSPADM

## 2023-12-16 RX ORDER — POLYETHYLENE GLYCOL 3350 17 G/17G
17 POWDER, FOR SOLUTION ORAL DAILY
Status: DISCONTINUED | OUTPATIENT
Start: 2023-12-16 | End: 2023-12-19 | Stop reason: HOSPADM

## 2023-12-16 RX ORDER — ASPIRIN 81 MG/1
81 TABLET ORAL 3 TIMES WEEKLY
Status: DISCONTINUED | OUTPATIENT
Start: 2023-12-18 | End: 2023-12-19 | Stop reason: HOSPADM

## 2023-12-16 RX ADMIN — CIPROFLOXACIN 400 MG: 2 INJECTION, SOLUTION INTRAVENOUS at 21:34

## 2023-12-16 RX ADMIN — METRONIDAZOLE 500 MG: 500 INJECTION, SOLUTION INTRAVENOUS at 20:23

## 2023-12-16 RX ADMIN — RANOLAZINE 500 MG: 500 TABLET, FILM COATED, EXTENDED RELEASE ORAL at 20:36

## 2023-12-16 RX ADMIN — ENOXAPARIN SODIUM 40 MG: 40 INJECTION SUBCUTANEOUS at 18:57

## 2023-12-16 RX ADMIN — SODIUM CHLORIDE 1000 ML: 9 INJECTION, SOLUTION INTRAVENOUS at 15:41

## 2023-12-16 RX ADMIN — CIPROFLOXACIN 400 MG: 400 INJECTION, SOLUTION INTRAVENOUS at 16:55

## 2023-12-16 RX ADMIN — IOHEXOL 80 ML: 350 INJECTION, SOLUTION INTRAVENOUS at 16:08

## 2023-12-16 RX ADMIN — LATANOPROST 1 DROP: 50 SOLUTION OPHTHALMIC at 20:28

## 2023-12-16 RX ADMIN — ROSUVASTATIN CALCIUM 5 MG: 10 TABLET, FILM COATED ORAL at 20:29

## 2023-12-16 RX ADMIN — POLYETHYLENE GLYCOL 3350 17 G: 17 POWDER, FOR SOLUTION ORAL at 20:36

## 2023-12-16 RX ADMIN — EZETIMIBE 10 MG: 10 TABLET ORAL at 20:29

## 2023-12-16 SDOH — SOCIAL STABILITY: SOCIAL INSECURITY: WERE YOU ABLE TO COMPLETE ALL THE BEHAVIORAL HEALTH SCREENINGS?: YES

## 2023-12-16 SDOH — SOCIAL STABILITY: SOCIAL INSECURITY: HAVE YOU HAD THOUGHTS OF HARMING ANYONE ELSE?: NO

## 2023-12-16 SDOH — SOCIAL STABILITY: SOCIAL INSECURITY: ABUSE: ADULT

## 2023-12-16 ASSESSMENT — ENCOUNTER SYMPTOMS
NAUSEA: 1
VOMITING: 0
MUSCULOSKELETAL NEGATIVE: 1
TROUBLE SWALLOWING: 0
WHEEZING: 0
DYSURIA: 0
DIARRHEA: 0
BACK PAIN: 0
WHEEZING: 0
CHILLS: 0
ABDOMINAL PAIN: 1
LIGHT-HEADEDNESS: 0
CHILLS: 1
SHORTNESS OF BREATH: 0
DYSURIA: 1
BLOOD IN STOOL: 0
SHORTNESS OF BREATH: 0
CONFUSION: 0
CONSTIPATION: 1
LIGHT-HEADEDNESS: 0
BLOOD IN STOOL: 0
CONSTIPATION: 1
NAUSEA: 1
NUMBNESS: 0
ABDOMINAL PAIN: 1
DIARRHEA: 0
DIZZINESS: 0
LOSS OF SENSATION IN FEET: 0
FLANK PAIN: 0
FEVER: 0
OCCASIONAL FEELINGS OF UNSTEADINESS: 0
DIFFICULTY URINATING: 0
COUGH: 0
UNEXPECTED WEIGHT CHANGE: 0
DIZZINESS: 0
DEPRESSION: 0
ANAL BLEEDING: 0
COUGH: 0
VOMITING: 0
WEAKNESS: 1
ENDOCRINE NEGATIVE: 1
FEVER: 0
PALPITATIONS: 0
WEAKNESS: 0
HEADACHES: 0
FREQUENCY: 1
HEMATURIA: 0
EYES NEGATIVE: 1

## 2023-12-16 ASSESSMENT — LIFESTYLE VARIABLES
SKIP TO QUESTIONS 9-10: 0
HAVE PEOPLE ANNOYED YOU BY CRITICIZING YOUR DRINKING: NO
REASON UNABLE TO ASSESS: NO
HAVE YOU EVER FELT YOU SHOULD CUT DOWN ON YOUR DRINKING: NO
AUDIT-C TOTAL SCORE: 5
SUBSTANCE_ABUSE_PAST_12_MONTHS: NO
PRESCIPTION_ABUSE_PAST_12_MONTHS: NO
HOW OFTEN DO YOU HAVE A DRINK CONTAINING ALCOHOL: 4 OR MORE TIMES A WEEK
EVER FELT BAD OR GUILTY ABOUT YOUR DRINKING: NO
HOW OFTEN DO YOU HAVE 6 OR MORE DRINKS ON ONE OCCASION: LESS THAN MONTHLY
HOW MANY STANDARD DRINKS CONTAINING ALCOHOL DO YOU HAVE ON A TYPICAL DAY: 1 OR 2
AUDIT-C TOTAL SCORE: 5
EVER HAD A DRINK FIRST THING IN THE MORNING TO STEADY YOUR NERVES TO GET RID OF A HANGOVER: NO

## 2023-12-16 ASSESSMENT — PAIN SCALES - GENERAL
PAINLEVEL_OUTOF10: 5 - MODERATE PAIN
PAINLEVEL_OUTOF10: 3
PAINLEVEL_OUTOF10: 5 - MODERATE PAIN
PAINLEVEL_OUTOF10: 2
PAINLEVEL_OUTOF10: 3

## 2023-12-16 ASSESSMENT — COGNITIVE AND FUNCTIONAL STATUS - GENERAL
DAILY ACTIVITIY SCORE: 21
PATIENT BASELINE BEDBOUND: NO
DRESSING REGULAR LOWER BODY CLOTHING: A LITTLE
STANDING UP FROM CHAIR USING ARMS: A LITTLE
WALKING IN HOSPITAL ROOM: A LITTLE
DRESSING REGULAR UPPER BODY CLOTHING: A LITTLE
HELP NEEDED FOR BATHING: A LITTLE
HELP NEEDED FOR BATHING: A LITTLE
DRESSING REGULAR UPPER BODY CLOTHING: A LITTLE
MOBILITY SCORE: 21
CLIMB 3 TO 5 STEPS WITH RAILING: A LITTLE
WALKING IN HOSPITAL ROOM: A LITTLE
CLIMB 3 TO 5 STEPS WITH RAILING: A LITTLE
MOBILITY SCORE: 21
DRESSING REGULAR LOWER BODY CLOTHING: A LITTLE
DAILY ACTIVITIY SCORE: 21
STANDING UP FROM CHAIR USING ARMS: A LITTLE

## 2023-12-16 ASSESSMENT — PAIN DESCRIPTION - DESCRIPTORS
DESCRIPTORS: ACHING;CRAMPING
DESCRIPTORS: ACHING
DESCRIPTORS: ACHING

## 2023-12-16 ASSESSMENT — PAIN - FUNCTIONAL ASSESSMENT
PAIN_FUNCTIONAL_ASSESSMENT: 0-10
PAIN_FUNCTIONAL_ASSESSMENT: 0-10

## 2023-12-16 ASSESSMENT — ACTIVITIES OF DAILY LIVING (ADL)
JUDGMENT_ADEQUATE_SAFELY_COMPLETE_DAILY_ACTIVITIES: YES
LACK_OF_TRANSPORTATION: NO
DRESSING YOURSELF: INDEPENDENT
WALKS IN HOME: INDEPENDENT
FEEDING YOURSELF: INDEPENDENT
HEARING - RIGHT EAR: FUNCTIONAL
TOILETING: NEEDS ASSISTANCE
PATIENT'S MEMORY ADEQUATE TO SAFELY COMPLETE DAILY ACTIVITIES?: YES
ASSISTIVE_DEVICE: EYEGLASSES
BATHING: NEEDS ASSISTANCE
GROOMING: INDEPENDENT
ADEQUATE_TO_COMPLETE_ADL: YES
HEARING - LEFT EAR: FUNCTIONAL

## 2023-12-16 ASSESSMENT — PATIENT HEALTH QUESTIONNAIRE - PHQ9
2. FEELING DOWN, DEPRESSED OR HOPELESS: NOT AT ALL
1. LITTLE INTEREST OR PLEASURE IN DOING THINGS: NOT AT ALL
2. FEELING DOWN, DEPRESSED OR HOPELESS: NOT AT ALL
SUM OF ALL RESPONSES TO PHQ9 QUESTIONS 1 & 2: 0
1. LITTLE INTEREST OR PLEASURE IN DOING THINGS: NOT AT ALL
SUM OF ALL RESPONSES TO PHQ9 QUESTIONS 1 AND 2: 0

## 2023-12-16 ASSESSMENT — COLUMBIA-SUICIDE SEVERITY RATING SCALE - C-SSRS
1. IN THE PAST MONTH, HAVE YOU WISHED YOU WERE DEAD OR WISHED YOU COULD GO TO SLEEP AND NOT WAKE UP?: NO
2. HAVE YOU ACTUALLY HAD ANY THOUGHTS OF KILLING YOURSELF?: NO
6. HAVE YOU EVER DONE ANYTHING, STARTED TO DO ANYTHING, OR PREPARED TO DO ANYTHING TO END YOUR LIFE?: NO

## 2023-12-16 ASSESSMENT — PAIN DESCRIPTION - LOCATION: LOCATION: ABDOMEN

## 2023-12-16 ASSESSMENT — PAIN DESCRIPTION - PAIN TYPE: TYPE: ACUTE PAIN

## 2023-12-16 NOTE — H&P
History Of Present Illness  Shanika Diaz is a 83 y.o. female with a PMH of HTN, HLD, CAD, anxiety, GERD, IBS, diverticulosis and diverticulitis, who presented to the ED with constipation, abdominal pain, and worsening nausea. Patient initially felt constipation 3 weeks ago, and started to take Miralax daily with prunes which helped improve her symptoms, so she started to scale back on them. However, after scaling it back down, she started to have worsening constipation. For the past 10 days, the patient started to experience abdominal pain that she rates at 7/10. Describes the pain as an aching pain that is non-radiating. In the last 3 days, the patient reports darker colored, cloudy urine with some dysuria. Patient has had decreased PO intake as well in the past couple of days due to the pain and nausea, and reports a 4-5 pound weight loss. Patient went to Dr. Mcdaniel's office today, and when he noticed that the patient had rebound tenderness on physical exam, he recommended that the patient come to the ED. Denies fevers, but has had worsening chills since abdominal pain began. No chest pain, palpitations. No SOB, cough, wheezing, or congestion. Denies darkened colored stools or bloody stools. Patient feels weaker than usual.    12 Point ROS negative unless otherwise specified above.     ED COURSE:   VS: 159/85, 85, 18, 98.1 F, 97% RA    Labs  - CBC: Unremarkable  - CMP: Na+ 135, BUN/Cr 15/1.03 w/ GFR 54, otherwise unremarkable  - UA: Hazy urine, +1 ketones, +3 leuks, 50+ WBCs, +1 bacteriuria, +1 hyaline casts  - Lactate 0.9    Imaging:  - CT abdomen and pelvis w/ contrast showed diverticulitis of the mid sigmoid colon with an adjacent 1.5 cm peridiverticular abscess    Interventions:  - 1L NS bolus, started patient on ciprofloxacin and metronidazole  - ED discussed case with Dr. Huerta of gen surg, who currently recommends antibiotic therapy at this time, no need for surgical intervention    Past Medical  History: HTN, HLD, CAD, anxiety, GERD, diverticulitis  Past Surgical History: Removal of left ear lesion, cataract surgery, tonsillectomy  Allergies: Codeine, penicillin, Sulfa drugs  Family History: A-fib, cerebral aneurysm, HLD, HTN, lymphoma, arthritis  Home Medications: See med rec    Social History:  - Coming from home  - Currently   - Tobacco: Never  - Alcohol: 1 glass of wine a day  - Illicit Drug: Denies    Code Status: Full       Past Medical History  She has a past medical history of Atherosclerotic heart disease of native coronary artery without angina pectoris, Personal history of other diseases of the respiratory system (02/02/2019), and Personal history of other specified conditions.    Surgical History  She has a past surgical history that includes Tonsillectomy (09/15/2014); Other surgical history (09/15/2014); Cataract extraction (09/15/2014); and Other surgical history (12/12/2016).     Social History  She reports that she has never smoked. She has never used smokeless tobacco. She reports current alcohol use of about 14.0 standard drinks of alcohol per week. She reports that she does not use drugs.    Family History  Family History   Problem Relation Name Age of Onset    Arthritis Mother      Hyperlipidemia Mother      Cerebral aneurysm Father      Atrial fibrillation Brother          Chronic    Other (Cardiac disorder) Brother      Hypertension Brother      Lymphoma Brother          Allergies  Codeine, Penicillin, and Sulfa (sulfonamide antibiotics)    Review of Systems   Constitutional:  Positive for chills. Negative for fever.   HENT: Negative.     Eyes: Negative.    Respiratory:  Negative for cough, shortness of breath and wheezing.    Cardiovascular:  Negative for chest pain, palpitations and leg swelling.   Gastrointestinal:  Positive for abdominal pain, constipation and nausea. Negative for blood in stool, diarrhea and vomiting.   Endocrine: Negative.    Genitourinary:  Positive for  dysuria.   Musculoskeletal: Negative.    Skin: Negative.    Neurological:  Positive for weakness. Negative for dizziness, light-headedness and headaches.        Physical Exam  Vitals reviewed.   Constitutional:       General: She is not in acute distress.     Appearance: She is not ill-appearing.   Cardiovascular:      Rate and Rhythm: Normal rate and regular rhythm.      Heart sounds: Murmur (Loud holosystolic murmur best ausculated in aortic region) heard.   Pulmonary:      Effort: No respiratory distress.      Breath sounds: Normal breath sounds. No wheezing.   Abdominal:      General: There is no distension.      Palpations: Abdomen is soft.      Tenderness: There is abdominal tenderness. There is guarding and rebound.      Comments: Tenderness to palpation elicited in epigastric region, periumbilical region, and bilateral lower quadrants. Patient has rebound and guarding in epigastric region.    Musculoskeletal:         General: No tenderness.      Right lower leg: No edema.      Left lower leg: No edema.   Neurological:      General: No focal deficit present.      Mental Status: She is alert and oriented to person, place, and time. Mental status is at baseline.          Last Recorded Vitals  /71 (BP Location: Right arm, Patient Position: Sitting)   Pulse 70   Temp 36.7 °C (98.1 °F) (Tympanic)   Resp 18   Wt 90.5 kg (199 lb 8.3 oz)   SpO2 97%     Relevant Results  Scheduled medications  amLODIPine, 5 mg, oral, Daily  [START ON 12/18/2023] aspirin, 81 mg, oral, Once per day on Mon Wed Fri  clopidogrel, 75 mg, oral, Daily  enoxaparin, 40 mg, subcutaneous, q24h  ezetimibe, 10 mg, oral, Nightly  latanoprost, 1 drop, Both Eyes, Nightly  metoprolol succinate XL, 25 mg, oral, Daily  metroNIDAZOLE, 500 mg, intravenous, Once  [START ON 12/17/2023] pantoprazole, 40 mg, oral, Daily before breakfast  polyethylene glycol, 17 g, oral, Daily  ranolazine, 500 mg, oral, q12h  rosuvastatin, 5 mg, oral,  Daily      Continuous medications     PRN medications  PRN medications: acetaminophen, nitroglycerin, ondansetron, oxyCODONE, oxyCODONE    Results for orders placed or performed during the hospital encounter of 12/16/23 (from the past 24 hour(s))   CBC and Auto Differential   Result Value Ref Range    WBC 9.3 4.4 - 11.3 x10*3/uL    nRBC 0.0 0.0 - 0.0 /100 WBCs    RBC 4.31 4.00 - 5.20 x10*6/uL    Hemoglobin 12.6 12.0 - 16.0 g/dL    Hematocrit 40.0 36.0 - 46.0 %    MCV 93 80 - 100 fL    MCH 29.2 26.0 - 34.0 pg    MCHC 31.5 (L) 32.0 - 36.0 g/dL    RDW 12.7 11.5 - 14.5 %    Platelets 314 150 - 450 x10*3/uL    Neutrophils % 68.9 40.0 - 80.0 %    Immature Granulocytes %, Automated 0.2 0.0 - 0.9 %    Lymphocytes % 20.8 13.0 - 44.0 %    Monocytes % 7.8 2.0 - 10.0 %    Eosinophils % 1.9 0.0 - 6.0 %    Basophils % 0.4 0.0 - 2.0 %    Neutrophils Absolute 6.38 (H) 1.60 - 5.50 x10*3/uL    Immature Granulocytes Absolute, Automated 0.02 0.00 - 0.50 x10*3/uL    Lymphocytes Absolute 1.93 0.80 - 3.00 x10*3/uL    Monocytes Absolute 0.72 0.05 - 0.80 x10*3/uL    Eosinophils Absolute 0.18 0.00 - 0.40 x10*3/uL    Basophils Absolute 0.04 0.00 - 0.10 x10*3/uL   Comprehensive metabolic panel   Result Value Ref Range    Glucose 83 74 - 99 mg/dL    Sodium 135 (L) 136 - 145 mmol/L    Potassium 4.1 3.5 - 5.3 mmol/L    Chloride 100 98 - 107 mmol/L    Bicarbonate 25 21 - 32 mmol/L    Anion Gap 14 10 - 20 mmol/L    Urea Nitrogen 15 6 - 23 mg/dL    Creatinine 1.03 0.50 - 1.05 mg/dL    eGFR 54 (L) >60 mL/min/1.73m*2    Calcium 9.7 8.6 - 10.3 mg/dL    Albumin 4.2 3.4 - 5.0 g/dL    Alkaline Phosphatase 110 33 - 136 U/L    Total Protein 7.1 6.4 - 8.2 g/dL    AST 16 9 - 39 U/L    Bilirubin, Total 0.5 0.0 - 1.2 mg/dL    ALT 19 7 - 45 U/L   Lactate   Result Value Ref Range    Lactate 0.9 0.4 - 2.0 mmol/L   Urinalysis with Reflex Microscopic and Culture   Result Value Ref Range    Color, Urine Yellow Straw, Yellow    Appearance, Urine Hazy (N) Clear     Specific Gravity, Urine 1.014 1.005 - 1.035    pH, Urine 5.0 5.0, 5.5, 6.0, 6.5, 7.0, 7.5, 8.0    Protein, Urine NEGATIVE NEGATIVE mg/dL    Glucose, Urine NEGATIVE NEGATIVE mg/dL    Blood, Urine NEGATIVE NEGATIVE    Ketones, Urine 20 (1+) (A) NEGATIVE mg/dL    Bilirubin, Urine NEGATIVE NEGATIVE    Urobilinogen, Urine <2.0 <2.0 mg/dL    Nitrite, Urine NEGATIVE NEGATIVE    Leukocyte Esterase, Urine LARGE (3+) (A) NEGATIVE   Extra Urine Gray Tube   Result Value Ref Range    Extra Tube Hold for add-ons.    Microscopic Only, Urine   Result Value Ref Range    WBC, Urine >50 (A) 1-5, NONE /HPF    WBC Clumps, Urine OCCASIONAL Reference range not established. /HPF    RBC, Urine 3-5 NONE, 1-2, 3-5 /HPF    Squamous Epithelial Cells, Urine 1-9 (SPARSE) Reference range not established. /HPF    Transitional Epithelial Cells, Urine 6-10 (2+) Reference range not established. /HPF    Bacteria, Urine 1+ (A) NONE SEEN /HPF    Mucus, Urine FEW Reference range not established. /LPF    Hyaline Casts, Urine 1+ (A) NONE /LPF       CT abdomen pelvis w IV contrast 12/16/2023    Narrative  Interpreted By:  Britni Saldana,  STUDY:  CT ABDOMEN PELVIS W IV CONTRAST; ;  12/16/2023 4:05 pm    INDICATION:  Signs/Symptoms:Abdomen pain.    COMPARISON:  05/05/2017    ACCESSION NUMBER(S):  [id number]    ORDERING CLINICIAN:  BENTLEY YOUNG    TECHNIQUE:  Imaging was performed from dome of the diaphragm through ischial  tuberosities with axial, coronal and sagittal images reconstructed.  Patient received 80 mL Omnipaque 350 intravenously    FINDINGS:  Lung bases are clear. No pleural effusions are noted. The visualized  heart appears normal in size.    No mass is noted in the liver. There is no intra or extrahepatic  biliary dilatation. Gallbladder is normal in appearance.    No mass or inflammation is noted involving the pancreas.    Spleen is normal in size with no focal mass noted.    Adrenal glands appear normal. Kidneys enhance  symmetrically with no  hydronephrosis noted.    Small hiatal hernia is noted. Bowel loops are nondilated. Appendix  appears normal.    Numerous diverticular present in the sigmoid colon. There is bowel  wall thickening in the mid sigmoid colon with adjacent mesenteric  inflammation compatible with diverticulitis. No free air or free  fluid is noted. There does however appear to be about 1.5 cm abscess  directly adjacent to 1 of the diverticula in the area of  inflammation..    Aorta and vena cava are normal in size. There are no pathologically  enlarged retroperitoneal, iliac or inguinal lymph nodes identified.    Urinary bladder is normal in appearance with no wall thickening.    Uterus is normal in size. No adnexal mass is identified.    No abdominal wall hernia is identified.    Degenerative changes are noted at multiple levels in the spine.  Minimal grade 1 anterolisthesis of L4 on L5 is associated with facet  arthropathy but no spondylolysis.    Impression  Diverticulitis of mid sigmoid colon with 1.5 cm peridiverticular  abscess      MACRO:  None.    Signed by: Britni Saldana 12/16/2023 4:31 PM  Dictation workstation:   [id number]       Assessment/Plan   Shanika Diaz is a 83 y.o. female with a PMH of HTN, HLD, CAD, anxiety, GERD, IBS, diverticulosis and diverticulitis who presented to the ED from Dr. Alegre's office with worsening abdominal pain, constipation, nausea, and decreased PO intake in the past 2 days, and is admitted for acute diverticulitis with adjacent diverticular abscess. Gen surg consulted.    Active Issues  #Acute diverticulitis with adjacent 1.5 cm diverticular abscess  - Patient presented to the ED with abdominal pain, constipation, nausea after eating, and poor PO intake  - At Dr. Alegre's office, she was found to have rebound and guarding on physical exam, prompting her to come to the ER  - CT abdomen and pelvis showed diverticulitis of the mid sigmoid colon with a 1.5 cm  peridiverticular abscess  - ED discussed case with Dr. Huerta, who is recommending Abx and supportive management at this time with no need for surgical intervention  - S/p 1L NS bolus, cipro and metronidazole in ED  - Continuing ciprofloxacin and metronidazole (12/16 - )  - Zofran PRN for nausea control  - PO Protonix 40 mg daily  - Pain regimen: Tylenol 975 q6h PRN mild pain, oxy 2.5 Q6h PRN moderate pain, oxy 5 q6h PRN severe pain  - Miralax daily for constipation  - Starting on soft diet, will advance as tolerated  - General surgery consulted, appreciate recs    #UTI  - Patient reports a 3-4 day history of intermittent dysuria and self reported darker colored urine  - UA positive for Hazy urine, +1 ketones, +3 leuks, 50+ WBCs, +1 bacteriuria, +1 hyaline casts  - Cipro for diverticulitis should cover potential UTI  - Urine cultures pending    Chronic Issues  #HTN  #CAD  - Continue home amlodipine 5 mg daily  - Continue home aspirin 81 mg MWF  - Continue home Plavix 75 mg daily  - Continue home metoprolol succinate 25 mg daily  - Continue home ranolazine 500 mg BID  - Continue home nitroglycerin 0.4 every 5 min PRN for chest pain    #HLD  - Continue home ezetimibe 10 mg nightly  - Continue home rosuvastatin 5 mg daily    Fluids: S/p 1L NS in ED  Electrolytes: Replete as needed  Nutrition: Soft diet  GI Ppx: Protonix  DVT Ppx: Lovenox    Oxygen: None  Antibiotics: Ciprofloxacin and metronidazole (12/16 - )    Dispo: 83 year old female admitted for acute diverticulitis with adjacent diverticular abscess. ED contacted Dr. Huerta, recommending antibiotics and supportive management at this time. Starting ciprofloxacin and metronidazole. Zofran for nausea control, Protonix, and pain regimen in place. Gen surg to follow. Estimated LOS > 48 hours        Billy Srinivasan MD

## 2023-12-16 NOTE — ED TRIAGE NOTES
Patient c/o generalized abdominal pain, abdominal distention and constipation for the past 10 days.    none

## 2023-12-16 NOTE — ASSESSMENT & PLAN NOTE
Also lower abdomen. With peritoneal signs. Simple UTI unlikely. Advised evaluation promptly in the ER. Her  will take her now. DDx: Acute diverticulitis, cholecystitis, pancreatitis, appendicitis, ischemic colitis, bowel obstruction.

## 2023-12-16 NOTE — PROGRESS NOTES
"Subjective   Patient ID: Shanika Diaz is a 83 y.o. female with h/o diverticulosis and diverticulitis who presents for Abdominal Pain (Pt presents with spouse c/o abdominal pain, bloating, urine discolored, urinary urgency, x1 wk.BL).  HPI    c/o abdominal pain and swelling, chills, feverish/hot at night, sweating. Hasn't checked her temperature. Hard time urinating; burns, sometimes difficulty getting it started, urgency. Dark urine. Very bloated. Epigastric pain occasionally. Denies flank or back pain. Has been having problems with constipation, has been taking Miralax once a week, prunes; feels this has been helping a little bit with the constipation. Denies bloody or black tarry stool, diarrhea, pale stool. Tried nothing for her urinary symptoms (e.g., Azo). Hurts the abdomen to walk, go over bumps in the road, etc.    Sometimes acid reflux, heartburn, and indigestion, has been worse recently. Nauseated after eating, but not every time. Tried nothing.    Denies PMHx or FMHx kidney stones.      Review of Systems   Constitutional:  Negative for chills, fever and unexpected weight change.   HENT:  Negative for ear pain and trouble swallowing.    Respiratory:  Negative for cough, shortness of breath and wheezing.    Cardiovascular:  Negative for chest pain.   Gastrointestinal:  Positive for abdominal pain, constipation and nausea. Negative for anal bleeding, blood in stool, diarrhea and vomiting.   Genitourinary:  Positive for frequency and urgency. Negative for difficulty urinating, dysuria, flank pain and hematuria.   Musculoskeletal:  Negative for back pain.   Skin:  Negative for rash.   Neurological:  Negative for dizziness, syncope, weakness, light-headedness and numbness.   Psychiatric/Behavioral:  Negative for behavioral problems and confusion.          Objective   /64   Pulse 80   Temp 36.6 °C (97.8 °F)   Ht 1.626 m (5' 4\")   Wt 84.8 kg (187 lb)   SpO2 94%   BMI 32.10 kg/m²     Physical " Exam  Vitals and nursing note reviewed.   Constitutional:       General: She is not in acute distress.     Appearance: Normal appearance.   HENT:      Head: Normocephalic and atraumatic.   Eyes:      General: No scleral icterus.     Extraocular Movements: Extraocular movements intact.      Conjunctiva/sclera: Conjunctivae normal.   Pulmonary:      Effort: Pulmonary effort is normal. No respiratory distress.   Abdominal:      General: Bowel sounds are normal. There is distension.      Palpations: Abdomen is soft. There is no mass.      Tenderness: There is abdominal tenderness (epigastric and across the lower abdomen). There is guarding and rebound. There is no right CVA tenderness or left CVA tenderness.   Skin:     General: Skin is warm and dry.      Coloration: Skin is not jaundiced.   Neurological:      Mental Status: She is alert and oriented to person, place, and time. Mental status is at baseline.   Psychiatric:         Behavior: Behavior normal.         Assessment/Plan   Problem List Items Addressed This Visit       Epigastric pain - Primary     Also lower abdomen. With peritoneal signs. Simple UTI unlikely. Advised evaluation promptly in the ER. Her  will take her now. DDx: Acute diverticulitis, cholecystitis, pancreatitis, appendicitis, ischemic colitis, bowel obstruction.          Other Visit Diagnoses       Urinary urgency        Relevant Orders    POCT UA Automated manually resulted (Completed)    Urine Culture                - Would give an additional bumex 1mg PO this afternoon and continue current dose of bumex 2mg PO daily  - Increase Toprol XL to 100 mg BID; HR goal 60-70  - continue with HDZN 25 mg q8h  - daily standing weight and strict I&Os

## 2023-12-17 LAB
ALBUMIN SERPL BCP-MCNC: 3.4 G/DL (ref 3.4–5)
ANION GAP SERPL CALC-SCNC: 13 MMOL/L (ref 10–20)
BACTERIA UR CULT: NORMAL
BUN SERPL-MCNC: 12 MG/DL (ref 6–23)
CALCIUM SERPL-MCNC: 8.9 MG/DL (ref 8.6–10.3)
CHLORIDE SERPL-SCNC: 104 MMOL/L (ref 98–107)
CO2 SERPL-SCNC: 24 MMOL/L (ref 21–32)
CREAT SERPL-MCNC: 0.95 MG/DL (ref 0.5–1.05)
ERYTHROCYTE [DISTWIDTH] IN BLOOD BY AUTOMATED COUNT: 12.8 % (ref 11.5–14.5)
FERRITIN SERPL-MCNC: 168 NG/ML (ref 8–150)
GFR SERPL CREATININE-BSD FRML MDRD: 60 ML/MIN/1.73M*2
GLUCOSE SERPL-MCNC: 101 MG/DL (ref 74–99)
HCT VFR BLD AUTO: 33.5 % (ref 36–46)
HGB BLD-MCNC: 10.4 G/DL (ref 12–16)
IRON SATN MFR SERPL: 13 % (ref 25–45)
IRON SERPL-MCNC: 35 UG/DL (ref 35–150)
MAGNESIUM SERPL-MCNC: 2.17 MG/DL (ref 1.6–2.4)
MCH RBC QN AUTO: 28.7 PG (ref 26–34)
MCHC RBC AUTO-ENTMCNC: 31 G/DL (ref 32–36)
MCV RBC AUTO: 93 FL (ref 80–100)
NRBC BLD-RTO: 0 /100 WBCS (ref 0–0)
PHOSPHATE SERPL-MCNC: 3.5 MG/DL (ref 2.5–4.9)
PLATELET # BLD AUTO: 265 X10*3/UL (ref 150–450)
POTASSIUM SERPL-SCNC: 3.9 MMOL/L (ref 3.5–5.3)
RBC # BLD AUTO: 3.62 X10*6/UL (ref 4–5.2)
SODIUM SERPL-SCNC: 137 MMOL/L (ref 136–145)
TIBC SERPL-MCNC: 277 UG/DL (ref 240–445)
UIBC SERPL-MCNC: 242 UG/DL (ref 110–370)
WBC # BLD AUTO: 5.1 X10*3/UL (ref 4.4–11.3)

## 2023-12-17 PROCEDURE — 99223 1ST HOSP IP/OBS HIGH 75: CPT

## 2023-12-17 PROCEDURE — 2500000002 HC RX 250 W HCPCS SELF ADMINISTERED DRUGS (ALT 637 FOR MEDICARE OP, ALT 636 FOR OP/ED)

## 2023-12-17 PROCEDURE — 83735 ASSAY OF MAGNESIUM: CPT

## 2023-12-17 PROCEDURE — 82728 ASSAY OF FERRITIN: CPT | Performed by: INTERNAL MEDICINE

## 2023-12-17 PROCEDURE — 36415 COLL VENOUS BLD VENIPUNCTURE: CPT

## 2023-12-17 PROCEDURE — 2500000004 HC RX 250 GENERAL PHARMACY W/ HCPCS (ALT 636 FOR OP/ED)

## 2023-12-17 PROCEDURE — 2500000001 HC RX 250 WO HCPCS SELF ADMINISTERED DRUGS (ALT 637 FOR MEDICARE OP)

## 2023-12-17 PROCEDURE — 96372 THER/PROPH/DIAG INJ SC/IM: CPT

## 2023-12-17 PROCEDURE — 2500000004 HC RX 250 GENERAL PHARMACY W/ HCPCS (ALT 636 FOR OP/ED): Performed by: INTERNAL MEDICINE

## 2023-12-17 PROCEDURE — 99222 1ST HOSP IP/OBS MODERATE 55: CPT | Performed by: SURGERY

## 2023-12-17 PROCEDURE — 85027 COMPLETE CBC AUTOMATED: CPT

## 2023-12-17 PROCEDURE — 1100000001 HC PRIVATE ROOM DAILY

## 2023-12-17 PROCEDURE — 94760 N-INVAS EAR/PLS OXIMETRY 1: CPT

## 2023-12-17 PROCEDURE — 84100 ASSAY OF PHOSPHORUS: CPT

## 2023-12-17 PROCEDURE — 83550 IRON BINDING TEST: CPT | Performed by: INTERNAL MEDICINE

## 2023-12-17 RX ORDER — PENCICLOVIR 10 MG/G
1 CREAM TOPICAL
Status: DISCONTINUED | OUTPATIENT
Start: 2023-12-17 | End: 2023-12-19 | Stop reason: HOSPADM

## 2023-12-17 RX ADMIN — AMLODIPINE BESYLATE 5 MG: 5 TABLET ORAL at 08:37

## 2023-12-17 RX ADMIN — PANTOPRAZOLE SODIUM 40 MG: 40 TABLET, DELAYED RELEASE ORAL at 06:15

## 2023-12-17 RX ADMIN — METRONIDAZOLE 500 MG: 500 INJECTION, SOLUTION INTRAVENOUS at 18:37

## 2023-12-17 RX ADMIN — POLYETHYLENE GLYCOL 3350 17 G: 17 POWDER, FOR SOLUTION ORAL at 08:37

## 2023-12-17 RX ADMIN — METRONIDAZOLE 500 MG: 500 INJECTION, SOLUTION INTRAVENOUS at 03:14

## 2023-12-17 RX ADMIN — METRONIDAZOLE 500 MG: 500 INJECTION, SOLUTION INTRAVENOUS at 11:19

## 2023-12-17 RX ADMIN — ENOXAPARIN SODIUM 40 MG: 40 INJECTION SUBCUTANEOUS at 18:37

## 2023-12-17 RX ADMIN — CLOPIDOGREL 75 MG: 75 TABLET ORAL at 08:37

## 2023-12-17 RX ADMIN — ONDANSETRON 4 MG: 2 INJECTION INTRAMUSCULAR; INTRAVENOUS at 00:37

## 2023-12-17 RX ADMIN — CIPROFLOXACIN 400 MG: 2 INJECTION, SOLUTION INTRAVENOUS at 20:57

## 2023-12-17 RX ADMIN — PENCICLOVIR 1 APPLICATION: 10 CREAM TOPICAL at 14:14

## 2023-12-17 RX ADMIN — METOPROLOL SUCCINATE 25 MG: 25 TABLET, EXTENDED RELEASE ORAL at 08:37

## 2023-12-17 RX ADMIN — RANOLAZINE 500 MG: 500 TABLET, FILM COATED, EXTENDED RELEASE ORAL at 20:52

## 2023-12-17 RX ADMIN — LATANOPROST 1 DROP: 50 SOLUTION OPHTHALMIC at 20:53

## 2023-12-17 RX ADMIN — EZETIMIBE 10 MG: 10 TABLET ORAL at 20:52

## 2023-12-17 RX ADMIN — PENCICLOVIR 1 APPLICATION: 10 CREAM TOPICAL at 10:40

## 2023-12-17 RX ADMIN — ROSUVASTATIN CALCIUM 5 MG: 10 TABLET, FILM COATED ORAL at 20:52

## 2023-12-17 RX ADMIN — RANOLAZINE 500 MG: 500 TABLET, FILM COATED, EXTENDED RELEASE ORAL at 08:37

## 2023-12-17 RX ADMIN — CIPROFLOXACIN 400 MG: 2 INJECTION, SOLUTION INTRAVENOUS at 08:43

## 2023-12-17 ASSESSMENT — COGNITIVE AND FUNCTIONAL STATUS - GENERAL
DAILY ACTIVITIY SCORE: 22
DRESSING REGULAR LOWER BODY CLOTHING: A LITTLE
MOBILITY SCORE: 24
DRESSING REGULAR LOWER BODY CLOTHING: A LITTLE
MOBILITY SCORE: 24
HELP NEEDED FOR BATHING: A LITTLE
DAILY ACTIVITIY SCORE: 22
HELP NEEDED FOR BATHING: A LITTLE

## 2023-12-17 ASSESSMENT — PAIN SCALES - GENERAL
PAINLEVEL_OUTOF10: 0 - NO PAIN
PAINLEVEL_OUTOF10: 0 - NO PAIN

## 2023-12-17 NOTE — PROGRESS NOTES
Shanika Diaz is a 83 y.o. female on day 1 of admission presenting with Acute diverticulitis.      Subjective   No acute events overnight. Feeling a little better. Dysuria resolved. Patient states she had a bowel movement for the last three days.        Objective     Last Recorded Vitals  /74   Pulse 69   Temp 36.8 °C (98.2 °F) (Temporal)   Resp 16   Wt 85 kg (187 lb 6.3 oz)   SpO2 96%   Intake/Output last 3 Shifts:    Intake/Output Summary (Last 24 hours) at 12/17/2023 0853  Last data filed at 12/17/2023 0414  Gross per 24 hour   Intake 600 ml   Output 1 ml   Net 599 ml       Admission Weight  Weight: 90.5 kg (199 lb 8.3 oz) (12/16/23 1511)    Daily Weight  12/17/23 : 85 kg (187 lb 6.3 oz)    Image Results  CT abdomen pelvis w IV contrast  Narrative: Interpreted By:  Britni Saldana,   STUDY:  CT ABDOMEN PELVIS W IV CONTRAST; ;  12/16/2023 4:05 pm      INDICATION:  Signs/Symptoms:Abdomen pain.      COMPARISON:  05/05/2017      ACCESSION NUMBER(S):  [id number]      ORDERING CLINICIAN:  BENTLEY YOUNG      TECHNIQUE:  Imaging was performed from dome of the diaphragm through ischial  tuberosities with axial, coronal and sagittal images reconstructed.  Patient received 80 mL Omnipaque 350 intravenously      FINDINGS:  Lung bases are clear. No pleural effusions are noted. The visualized  heart appears normal in size.      No mass is noted in the liver. There is no intra or extrahepatic  biliary dilatation. Gallbladder is normal in appearance.      No mass or inflammation is noted involving the pancreas.      Spleen is normal in size with no focal mass noted.      Adrenal glands appear normal. Kidneys enhance symmetrically with no  hydronephrosis noted.      Small hiatal hernia is noted. Bowel loops are nondilated. Appendix  appears normal.      Numerous diverticular present in the sigmoid colon. There is bowel  wall thickening in the mid sigmoid colon with adjacent mesenteric  inflammation  compatible with diverticulitis. No free air or free  fluid is noted. There does however appear to be about 1.5 cm abscess  directly adjacent to 1 of the diverticula in the area of  inflammation..      Aorta and vena cava are normal in size. There are no pathologically  enlarged retroperitoneal, iliac or inguinal lymph nodes identified.      Urinary bladder is normal in appearance with no wall thickening.      Uterus is normal in size. No adnexal mass is identified.      No abdominal wall hernia is identified.      Degenerative changes are noted at multiple levels in the spine.  Minimal grade 1 anterolisthesis of L4 on L5 is associated with facet  arthropathy but no spondylolysis.      Impression: Diverticulitis of mid sigmoid colon with 1.5 cm peridiverticular  abscess          MACRO:  None.      Signed by: Britni Saldana 12/16/2023 4:31 PM  Dictation workstation:   [id number]      Physical Exam  Vitals reviewed.   Constitutional:       General: She is not in acute distress.     Appearance: She is not ill-appearing.   Cardiovascular:      Rate and Rhythm: Normal rate and regular rhythm.      Heart sounds: Murmur (Loud holosystolic murmur best ausculated in aortic region) heard.   Pulmonary:      Effort: No respiratory distress.      Breath sounds: Normal breath sounds. No wheezing.   Abdominal:      General: There is no distension.      Palpations: Abdomen is soft.      Tenderness: No rebounding or guarding this morning      Comments: Tenderness to palpation elicited in epigastric region, periumbilical region, and bilateral lower quadrants.   Musculoskeletal:         General: No tenderness.      Right lower leg: No edema.      Left lower leg: No edema.   Neurological:      General: No focal deficit present.      Mental Status: She is alert and oriented to person, place, and time. Mental status is at baseline.      Relevant Results               Assessment/Plan      Shanika Diaz is a 83 y.o. female with a PMH  of HTN, HLD, CAD, anxiety, GERD, IBS, diverticulosis and diverticulitis who presented to the ED from Dr. Alegre's office with worsening abdominal pain, constipation, nausea, and decreased PO intake in the past 2 days, and is admitted for acute diverticulitis with adjacent diverticular abscess. Gen surg consulted.     Active Issues  #Acute diverticulitis with adjacent 1.5 cm diverticular abscess  - Patient presented to the ED with abdominal pain, constipation, nausea after eating, and poor PO intake  - At Dr. Alegre's office, she was found to have rebound and guarding on physical exam, prompting her to come to the ER  - CT abdomen and pelvis showed diverticulitis of the mid sigmoid colon with a 1.5 cm peridiverticular abscess  - ED discussed case with Dr. Huerta, who is recommending Abx and supportive management at this time with no need for surgical intervention  - S/p 1L NS bolus, cipro and metronidazole in ED  - Continuing ciprofloxacin and metronidazole (12/16 - )  - Zofran PRN for nausea control  - PO Protonix 40 mg daily  - Pain regimen: Tylenol 975 q6h PRN mild pain, oxy 2.5 Q6h PRN moderate pain, oxy 5 q6h PRN severe pain  - Miralax daily for constipation  - Starting on soft diet, will advance as tolerated  - General surgery consulted, appreciate recs  -12/17/23: Patient is not eliciting rebounding or guarding on exam today. General surgery recs continuing IV abx, bowel restt, serial abdominal exams, and they want patient to remain NPO until patient is pain free and then advance diet to clear liquids     #UTI  - Patient reports a 3-4 day history of intermittent dysuria and self reported darker colored urine  - UA positive for Hazy urine, +1 ketones, +3 leuks, 50+ WBCs, +1 bacteriuria, +1 hyaline casts  - Cipro for diverticulitis should cover potential UTI  - Urine cultures pending  -12/17/23: Currently on IV abx that will provide uti coverage. Urine culture is still pending     Chronic Issues  #HTN  #CAD  -  Continue home amlodipine 5 mg daily  - Continue home aspirin 81 mg MWF  - Continue home Plavix 75 mg daily  - Continue home metoprolol succinate 25 mg daily  - Continue home ranolazine 500 mg BID  - Continue home nitroglycerin 0.4 every 5 min PRN for chest pain     #HLD  - Continue home ezetimibe 10 mg nightly  - Continue home rosuvastatin 5 mg daily     Fluids: S/p 1L NS in ED  Electrolytes: Replete as needed  Nutrition: Soft diet  GI Ppx: Protonix  DVT Ppx: Lovenox     Oxygen: None  Antibiotics: Ciprofloxacin and metronidazole (12/16 - )     Dispo: 83 year old female admitted for acute diverticulitis with adjacent diverticular abscess. ED contacted Dr. Huerta, recommending antibiotics and supportive management at this time. Starting ciprofloxacin and metronidazole. Zofran for nausea control, Protonix, and pain regimen in place. Gen surg to follow. Estimated LOS > 48 hours                     Becca Marsh MD

## 2023-12-17 NOTE — HOSPITAL COURSE
Shanika Diza is a 83 y.o. female with a PMH of HTN, HLD, CAD, anxiety, GERD, IBS, diverticulosis and diverticulitis who presented to the ED from Dr. Alegre's office with worsening abdominal pain, constipation, nausea, and decreased PO intake in the past 2 days, and is admitted for acute diverticulitis with adjacent diverticular abscess. Gen surg consulted.  Patient had a CT abdomen pelvis with contrast that showed diverticulitis of the mid sigmoid colon with adjacent 1.5 cm peridiverticular abscess.  Patient was given 1 L normal saline bolus, started patient on ciprofloxacin and metronidazole.  General surgery saw the patient and wants to continue n.p.o. and bowel rest with serial abdominal examinations and IV antibiotics for now. No repeat CT planned due to small suspected abscess.

## 2023-12-17 NOTE — PROGRESS NOTES
12/17/23 1249   Discharge Planning   Living Arrangements Spouse/significant other   Support Systems Spouse/significant other   Assistance Needed A&Ox3, independent, drives, room air at baseline   Type of Residence Private residence   Number of Stairs to Enter Residence 2   Number of Stairs Within Residence 0   Do you have animals or pets at home? No   Home or Post Acute Services None   Patient expects to be discharged to: Home no needs   Does the patient need discharge transport arranged? No     12/18/2023 1205: Spoke to the patient at the bedside. Patient continues to deny any discharge needs.

## 2023-12-17 NOTE — CONSULTS
Reason For Consult  Diverticulitis with abscess    History Of Present Illness  Shanika Diaz is a 83 y.o. female presenting with a 10-day history of constipation.  The patient also felt as if she had a urinary tract infection because the urine was cloudy and dark.  She had been eating and had not restricted her diet.  She had been taking MiraLAX and was fairly regular with that but stopped.  She went to see her family doctor Saturday morning and was advised to come to the emergency room..     Past Medical History  She has a past medical history of Atherosclerotic heart disease of native coronary artery without angina pectoris, Hypertension, Personal history of other diseases of the respiratory system (02/02/2019), and Personal history of other specified conditions.    Surgical History  She has a past surgical history that includes Tonsillectomy (09/15/2014); Other surgical history (09/15/2014); Cataract extraction (09/15/2014); Other surgical history (12/12/2016); Cardiac surgery; and Coronary angioplasty with stent (10/04/2016).     Social History  She reports that she has never smoked. She has never used smokeless tobacco. She reports current alcohol use of about 14.0 standard drinks of alcohol per week. She reports that she does not use drugs.    Family History  Family History   Problem Relation Name Age of Onset    Arthritis Mother      Hyperlipidemia Mother      Cerebral aneurysm Father      Atrial fibrillation Brother          Chronic    Other (Cardiac disorder) Brother      Hypertension Brother      Lymphoma Brother          Allergies  Sulfa (sulfonamide antibiotics), Codeine, and Penicillin    Review of Systems  She denies any nausea or vomiting she is passing some flatus.     Physical Exam  Head is normocephalic atraumatic eyes extraocular movements are intact the patient has a slight twitch of her mouth on occasion.  Lungs are clear bilaterally heart is regular rate and rhythm.  Abdomen is soft and  "nontender in most quadrants just a mild tenderness in the right lower quadrant.  Extremities do not reveal any gross deformities     Last Recorded Vitals  Blood pressure 124/74, pulse 69, temperature 36.8 °C (98.2 °F), temperature source Temporal, resp. rate 16, height 1.626 m (5' 4\"), weight 85 kg (187 lb 6.3 oz), SpO2 96 %.    Relevant Results  CT scan showed diverticulitis of the sigmoid colon with a small 1.5 cm associated abscess.     Assessment/Plan     Agree with IV antibiotics which seem to be helping the patient already.  Would recommend bowel rest and serial abdominal exams.  Once she is completely pain-free we can resume a clear liquid diet and advance as tolerated to a low residue diet.  If she has any worsening symptoms or signs may need a repeat CT scan    I spent 20 minutes in the professional and overall care of this patient.      Jojo Huerta MD    "

## 2023-12-17 NOTE — CARE PLAN
Problem: Pain - Adult  Goal: Verbalizes/displays adequate comfort level or baseline comfort level  Outcome: Progressing     Problem: Safety - Adult  Goal: Free from fall injury  Outcome: Progressing     Problem: Discharge Planning  Goal: Discharge to home or other facility with appropriate resources  Outcome: Progressing     Problem: Chronic Conditions and Co-morbidities  Goal: Patient's chronic conditions and co-morbidity symptoms are monitored and maintained or improved  Outcome: Progressing     Problem: Fall/Injury  Goal: Not fall by end of shift  Outcome: Progressing  Goal: Be free from injury by end of the shift  Outcome: Progressing  Goal: Verbalize understanding of personal risk factors for fall in the hospital  Outcome: Progressing  Goal: Verbalize understanding of risk factor reduction measures to prevent injury from fall in the home  Outcome: Progressing  Goal: Use assistive devices by end of the shift  Outcome: Progressing  Goal: Pace activities to prevent fatigue by end of the shift  Outcome: Progressing     Problem: Pain  Goal: Takes deep breaths with improved pain control throughout the shift  Outcome: Progressing  Goal: Turns in bed with improved pain control throughout the shift  Outcome: Progressing  Goal: Walks with improved pain control throughout the shift  Outcome: Progressing  Goal: Performs ADL's with improved pain control throughout shift  Outcome: Progressing  Goal: Free from acute confusion related to pain meds throughout the shift  Outcome: Progressing   The patient's goals for the shift include      The clinical goals for the shift include patient will have abd pain controled through end day    Patient reports having no pain this shift.

## 2023-12-17 NOTE — CARE PLAN
The patient's goals for the shift include  pain control and rest    The clinical goals for the shift include Manage pain and help improve comfort    Over the shift, the patient did not make progress toward the following goals. Barriers to progression include. Recommendations to address these barriers include .

## 2023-12-18 LAB
ALBUMIN SERPL BCP-MCNC: 3.6 G/DL (ref 3.4–5)
ANION GAP SERPL CALC-SCNC: 13 MMOL/L (ref 10–20)
ATRIAL RATE: 78 BPM
BACTERIA UR CULT: ABNORMAL
BASOPHILS # BLD AUTO: 0.03 X10*3/UL (ref 0–0.1)
BASOPHILS NFR BLD AUTO: 0.7 %
BUN SERPL-MCNC: 9 MG/DL (ref 6–23)
CALCIUM SERPL-MCNC: 9 MG/DL (ref 8.6–10.3)
CHLORIDE SERPL-SCNC: 104 MMOL/L (ref 98–107)
CO2 SERPL-SCNC: 25 MMOL/L (ref 21–32)
CREAT SERPL-MCNC: 1.01 MG/DL (ref 0.5–1.05)
EOSINOPHIL # BLD AUTO: 0.22 X10*3/UL (ref 0–0.4)
EOSINOPHIL NFR BLD AUTO: 5 %
ERYTHROCYTE [DISTWIDTH] IN BLOOD BY AUTOMATED COUNT: 12.6 % (ref 11.5–14.5)
GFR SERPL CREATININE-BSD FRML MDRD: 55 ML/MIN/1.73M*2
GLUCOSE SERPL-MCNC: 98 MG/DL (ref 74–99)
HCT VFR BLD AUTO: 34.7 % (ref 36–46)
HGB BLD-MCNC: 10.9 G/DL (ref 12–16)
IMM GRANULOCYTES # BLD AUTO: 0.01 X10*3/UL (ref 0–0.5)
IMM GRANULOCYTES NFR BLD AUTO: 0.2 % (ref 0–0.9)
LYMPHOCYTES # BLD AUTO: 1.1 X10*3/UL (ref 0.8–3)
LYMPHOCYTES NFR BLD AUTO: 24.9 %
MAGNESIUM SERPL-MCNC: 2.11 MG/DL (ref 1.6–2.4)
MCH RBC QN AUTO: 29.3 PG (ref 26–34)
MCHC RBC AUTO-ENTMCNC: 31.4 G/DL (ref 32–36)
MCV RBC AUTO: 93 FL (ref 80–100)
MONOCYTES # BLD AUTO: 0.46 X10*3/UL (ref 0.05–0.8)
MONOCYTES NFR BLD AUTO: 10.4 %
NEUTROPHILS # BLD AUTO: 2.6 X10*3/UL (ref 1.6–5.5)
NEUTROPHILS NFR BLD AUTO: 58.8 %
NRBC BLD-RTO: 0 /100 WBCS (ref 0–0)
P AXIS: 51 DEGREES
P OFFSET: 192 MS
P ONSET: 138 MS
PHOSPHATE SERPL-MCNC: 4.2 MG/DL (ref 2.5–4.9)
PLATELET # BLD AUTO: 292 X10*3/UL (ref 150–450)
POTASSIUM SERPL-SCNC: 3.8 MMOL/L (ref 3.5–5.3)
PR INTERVAL: 170 MS
Q ONSET: 223 MS
QRS COUNT: 13 BEATS
QRS DURATION: 92 MS
QT INTERVAL: 394 MS
QTC CALCULATION(BAZETT): 449 MS
QTC FREDERICIA: 430 MS
R AXIS: 48 DEGREES
RBC # BLD AUTO: 3.72 X10*6/UL (ref 4–5.2)
SODIUM SERPL-SCNC: 138 MMOL/L (ref 136–145)
T AXIS: 61 DEGREES
T OFFSET: 420 MS
VENTRICULAR RATE: 78 BPM
WBC # BLD AUTO: 4.4 X10*3/UL (ref 4.4–11.3)

## 2023-12-18 PROCEDURE — 99232 SBSQ HOSP IP/OBS MODERATE 35: CPT

## 2023-12-18 PROCEDURE — 85025 COMPLETE CBC W/AUTO DIFF WBC: CPT

## 2023-12-18 PROCEDURE — 2500000001 HC RX 250 WO HCPCS SELF ADMINISTERED DRUGS (ALT 637 FOR MEDICARE OP)

## 2023-12-18 PROCEDURE — 2500000004 HC RX 250 GENERAL PHARMACY W/ HCPCS (ALT 636 FOR OP/ED)

## 2023-12-18 PROCEDURE — 2500000002 HC RX 250 W HCPCS SELF ADMINISTERED DRUGS (ALT 637 FOR MEDICARE OP, ALT 636 FOR OP/ED)

## 2023-12-18 PROCEDURE — 36415 COLL VENOUS BLD VENIPUNCTURE: CPT

## 2023-12-18 PROCEDURE — 83735 ASSAY OF MAGNESIUM: CPT

## 2023-12-18 PROCEDURE — 80069 RENAL FUNCTION PANEL: CPT

## 2023-12-18 PROCEDURE — 1100000001 HC PRIVATE ROOM DAILY

## 2023-12-18 PROCEDURE — 99231 SBSQ HOSP IP/OBS SF/LOW 25: CPT | Performed by: SURGERY

## 2023-12-18 PROCEDURE — 96372 THER/PROPH/DIAG INJ SC/IM: CPT

## 2023-12-18 RX ADMIN — PENCICLOVIR 1 APPLICATION: 10 CREAM TOPICAL at 16:03

## 2023-12-18 RX ADMIN — METRONIDAZOLE 500 MG: 500 INJECTION, SOLUTION INTRAVENOUS at 02:49

## 2023-12-18 RX ADMIN — CIPROFLOXACIN 400 MG: 2 INJECTION, SOLUTION INTRAVENOUS at 09:28

## 2023-12-18 RX ADMIN — RANOLAZINE 500 MG: 500 TABLET, FILM COATED, EXTENDED RELEASE ORAL at 20:42

## 2023-12-18 RX ADMIN — PENCICLOVIR 1 APPLICATION: 10 CREAM TOPICAL at 12:55

## 2023-12-18 RX ADMIN — LATANOPROST 1 DROP: 50 SOLUTION OPHTHALMIC at 20:38

## 2023-12-18 RX ADMIN — PENCICLOVIR 1 APPLICATION: 10 CREAM TOPICAL at 22:19

## 2023-12-18 RX ADMIN — RANOLAZINE 500 MG: 500 TABLET, FILM COATED, EXTENDED RELEASE ORAL at 08:56

## 2023-12-18 RX ADMIN — METRONIDAZOLE 500 MG: 500 INJECTION, SOLUTION INTRAVENOUS at 20:34

## 2023-12-18 RX ADMIN — ROSUVASTATIN CALCIUM 5 MG: 10 TABLET, FILM COATED ORAL at 20:42

## 2023-12-18 RX ADMIN — PENCICLOVIR 1 APPLICATION: 10 CREAM TOPICAL at 20:38

## 2023-12-18 RX ADMIN — PANTOPRAZOLE SODIUM 40 MG: 40 TABLET, DELAYED RELEASE ORAL at 06:01

## 2023-12-18 RX ADMIN — METOPROLOL SUCCINATE 25 MG: 25 TABLET, EXTENDED RELEASE ORAL at 08:56

## 2023-12-18 RX ADMIN — ASPIRIN 81 MG: 81 TABLET, COATED ORAL at 08:56

## 2023-12-18 RX ADMIN — CLOPIDOGREL 75 MG: 75 TABLET ORAL at 08:56

## 2023-12-18 RX ADMIN — PENCICLOVIR 1 APPLICATION: 10 CREAM TOPICAL at 18:01

## 2023-12-18 RX ADMIN — EZETIMIBE 10 MG: 10 TABLET ORAL at 20:42

## 2023-12-18 RX ADMIN — PENCICLOVIR 1 APPLICATION: 10 CREAM TOPICAL at 08:55

## 2023-12-18 RX ADMIN — PENCICLOVIR 1 APPLICATION: 10 CREAM TOPICAL at 06:04

## 2023-12-18 RX ADMIN — PENCICLOVIR 1 APPLICATION: 10 CREAM TOPICAL at 11:19

## 2023-12-18 RX ADMIN — AMLODIPINE BESYLATE 5 MG: 5 TABLET ORAL at 08:56

## 2023-12-18 RX ADMIN — METRONIDAZOLE 500 MG: 500 INJECTION, SOLUTION INTRAVENOUS at 14:08

## 2023-12-18 RX ADMIN — CIPROFLOXACIN 400 MG: 2 INJECTION, SOLUTION INTRAVENOUS at 22:18

## 2023-12-18 RX ADMIN — PENCICLOVIR 1 APPLICATION: 10 CREAM TOPICAL at 14:25

## 2023-12-18 RX ADMIN — ENOXAPARIN SODIUM 40 MG: 40 INJECTION SUBCUTANEOUS at 18:35

## 2023-12-18 ASSESSMENT — COGNITIVE AND FUNCTIONAL STATUS - GENERAL
HELP NEEDED FOR BATHING: A LITTLE
DAILY ACTIVITIY SCORE: 22
DRESSING REGULAR LOWER BODY CLOTHING: A LITTLE
MOBILITY SCORE: 24
DRESSING REGULAR LOWER BODY CLOTHING: A LITTLE
MOBILITY SCORE: 24
HELP NEEDED FOR BATHING: A LITTLE
DAILY ACTIVITIY SCORE: 22

## 2023-12-18 ASSESSMENT — PAIN SCALES - GENERAL
PAINLEVEL_OUTOF10: 0 - NO PAIN
PAINLEVEL_OUTOF10: 0 - NO PAIN

## 2023-12-18 ASSESSMENT — PAIN - FUNCTIONAL ASSESSMENT: PAIN_FUNCTIONAL_ASSESSMENT: 0-10

## 2023-12-18 NOTE — CARE PLAN
The patient's goals for the shift include  pain control and rest    The clinical goals for the shift include patient will have abd pain controled through end day    Over the shift, the patient did not make progress toward the following goals. Barriers to progression include. Recommendations to address these barriers include.

## 2023-12-18 NOTE — PROGRESS NOTES
"Shanika Diaz is a 83 y.o. female on day 2 of admission presenting with Acute diverticulitis.    Subjective   The patient is sitting up in bed watching television and has her make-up on.       Objective   Vital signs are stable  Physical Exam abdomen is soft with some mild tenderness in the suprapubic to right lower abdomen.  This tenderness is basically unchanged.  Lungs are clear and heart is regular rate and rhythm.  Extremities do not reveal any gross deformities.    Last Recorded Vitals  Blood pressure 128/76, pulse 79, temperature 36.5 °C (97.7 °F), temperature source Temporal, resp. rate 18, height 1.626 m (5' 4\"), weight 85 kg (187 lb 6.3 oz), SpO2 93 %.  Intake/Output last 3 Shifts:  I/O last 3 completed shifts:  In: 1100 (12.9 mL/kg) [IV Piggyback:1100]  Out: 1 (0 mL/kg) [Urine:1 (0 mL/kg/hr)]  Weight: 85 kg     Relevant Results              Diverticulitis with small abscess okay to begin clear liquids today.  Continue to monitor exam and laboratories.  Would prefer the patient to be pain-free on exam before discharge               Assessment/Plan   Principal Problem:    Acute diverticulitis    Small abscess.  Would not expect this to resolve on CT scan for a week or 2 therefore there is probably not much utility in repeating the CAT scan prior to discharge.       I spent 10 minutes in the professional and overall care of this patient.      Jojo Huerta MD      "

## 2023-12-18 NOTE — CARE PLAN
The patient's goals for the shift include      The clinical goals for the shift include Patient will have abdominal pain controlled throughout shift      Problem: Pain - Adult  Goal: Verbalizes/displays adequate comfort level or baseline comfort level  Outcome: Progressing     Problem: Safety - Adult  Goal: Free from fall injury  Outcome: Progressing     Problem: Discharge Planning  Goal: Discharge to home or other facility with appropriate resources  Outcome: Progressing     Problem: Chronic Conditions and Co-morbidities  Goal: Patient's chronic conditions and co-morbidity symptoms are monitored and maintained or improved  Outcome: Progressing     Problem: Fall/Injury  Goal: Not fall by end of shift  Outcome: Progressing  Goal: Be free from injury by end of the shift  Outcome: Progressing  Goal: Verbalize understanding of personal risk factors for fall in the hospital  Outcome: Progressing  Goal: Verbalize understanding of risk factor reduction measures to prevent injury from fall in the home  Outcome: Progressing  Goal: Use assistive devices by end of the shift  Outcome: Progressing  Goal: Pace activities to prevent fatigue by end of the shift  Outcome: Progressing     Problem: Pain  Goal: Takes deep breaths with improved pain control throughout the shift  Outcome: Progressing  Goal: Turns in bed with improved pain control throughout the shift  Outcome: Progressing  Goal: Walks with improved pain control throughout the shift  Outcome: Progressing  Goal: Performs ADL's with improved pain control throughout shift  Outcome: Progressing  Goal: Participates in PT with improved pain control throughout the shift  Outcome: Progressing  Goal: Free from opioid side effects throughout the shift  Outcome: Progressing  Goal: Free from acute confusion related to pain meds throughout the shift  Outcome: Progressing     Problem: Nutrition  Goal: Less than 5 days NPO/clear liquids  Outcome: Progressing  Goal: Oral intake greater  than 50%  Outcome: Progressing  Goal: Oral intake greater 75%  Outcome: Progressing  Goal: Consume prescribed supplement  Outcome: Progressing  Goal: Adequate PO fluid intake  Outcome: Progressing  Goal: Nutrition support goals are met within 48 hrs  Outcome: Progressing  Goal: Nutrition support is meeting 75% of nutrient needs  Outcome: Progressing  Goal: Tube feed tolerance  Outcome: Progressing  Goal: BG  mg/dL  Outcome: Progressing  Goal: Lab values WNL  Outcome: Progressing  Goal: Electrolytes WNL  Outcome: Progressing  Goal: Promote healing  Outcome: Progressing  Goal: Maintain stable weight  Outcome: Progressing  Goal: Reduce weight from edema/fluid  Outcome: Progressing  Goal: Gradual weight gain  Outcome: Progressing  Goal: Improve ostomy output  Outcome: Progressing

## 2023-12-18 NOTE — PROGRESS NOTES
"Internal Medicine Daily Progress Note    Shanika Diaz is a 83 y.o. female on Hospital Day: 3 of admission presenting with Acute diverticulitis.    SUBJECTIVE    Overnight Events: No acute overnight events. Pt seen this morning. Appears well and advancing diet as tolerating. Gen surg - no CT scan today as abscess appears to be small with no change to management.     OBJECTIVE  Vitals  Visit Vitals  /76 (BP Location: Right arm, Patient Position: Lying)   Pulse 79   Temp 36.5 °C (97.7 °F) (Temporal)   Resp 18   Ht 1.626 m (5' 4\")   Wt 85 kg (187 lb 6.3 oz)   SpO2 93%   BMI 32.17 kg/m²   OB Status Postmenopausal   Smoking Status Never   BSA 1.96 m²       Physical Exam   Physical Exam  Vitals reviewed.   Constitutional:       General: She is not in acute distress.  HENT:      Mouth/Throat:      Mouth: Mucous membranes are moist.   Cardiovascular:      Rate and Rhythm: Normal rate and regular rhythm.      Heart sounds: Normal heart sounds. No murmur heard.     No gallop.   Pulmonary:      Effort: Pulmonary effort is normal.      Breath sounds: Normal breath sounds.   Abdominal:      General: Abdomen is flat. There is no distension.      Palpations: Abdomen is soft.      Tenderness: There is no abdominal tenderness.   Skin:     General: Skin is warm and dry.   Neurological:      Mental Status: She is alert. Mental status is at baseline.   Psychiatric:         Mood and Affect: Mood normal.         Behavior: Behavior normal.           IOs    Intake/Output Summary (Last 24 hours) at 12/18/2023 0849  Last data filed at 12/18/2023 0349  Gross per 24 hour   Intake 700 ml   Output --   Net 700 ml       Vent settings:         Labs:   Results from last 72 hours   Lab Units 12/18/23  0549 12/17/23  0703 12/16/23  1518   SODIUM mmol/L 138 137 135*   POTASSIUM mmol/L 3.8 3.9 4.1   CHLORIDE mmol/L 104 104 100   CO2 mmol/L 25 24 25   BUN mg/dL 9 12 15   CREATININE mg/dL 1.01 0.95 1.03   GLUCOSE mg/dL 98 101* 83   CALCIUM " "mg/dL 9.0 8.9 9.7   ANION GAP mmol/L 13 13 14   EGFR mL/min/1.73m*2 55* 60* 54*   PHOSPHORUS mg/dL 4.2 3.5  --       Results from last 72 hours   Lab Units 12/18/23  0549 12/17/23  0703 12/16/23  1518   WBC AUTO x10*3/uL 4.4 5.1 9.3   HEMOGLOBIN g/dL 10.9* 10.4* 12.6   HEMATOCRIT % 34.7* 33.5* 40.0   PLATELETS AUTO x10*3/uL 292 265 314   NEUTROS PCT AUTO % 58.8  --  68.9   LYMPHS PCT AUTO % 24.9  --  20.8   MONOS PCT AUTO % 10.4  --  7.8   EOS PCT AUTO % 5.0  --  1.9      Lab Results   Component Value Date    CALCIUM 9.0 12/18/2023    PHOS 4.2 12/18/2023      No results found for: \"CRP\"   [unfilled]     Micro/ID:   No results found for the last 90 days.                   No lab exists for component: \"AGALPCRNB\"   .ID  Lab Results   Component Value Date    URINECULTURE No significant growth 12/16/2023       Images  CT abdomen pelvis w IV contrast  Narrative: Interpreted By:  Britni Saldana,   STUDY:  CT ABDOMEN PELVIS W IV CONTRAST; ;  12/16/2023 4:05 pm      INDICATION:  Signs/Symptoms:Abdomen pain.      COMPARISON:  05/05/2017      ACCESSION NUMBER(S):  [id number]      ORDERING CLINICIAN:  BENTLEY YOUNG      TECHNIQUE:  Imaging was performed from dome of the diaphragm through ischial  tuberosities with axial, coronal and sagittal images reconstructed.  Patient received 80 mL Omnipaque 350 intravenously      FINDINGS:  Lung bases are clear. No pleural effusions are noted. The visualized  heart appears normal in size.      No mass is noted in the liver. There is no intra or extrahepatic  biliary dilatation. Gallbladder is normal in appearance.      No mass or inflammation is noted involving the pancreas.      Spleen is normal in size with no focal mass noted.      Adrenal glands appear normal. Kidneys enhance symmetrically with no  hydronephrosis noted.      Small hiatal hernia is noted. Bowel loops are nondilated. Appendix  appears normal.      Numerous diverticular present in the sigmoid colon. " There is bowel  wall thickening in the mid sigmoid colon with adjacent mesenteric  inflammation compatible with diverticulitis. No free air or free  fluid is noted. There does however appear to be about 1.5 cm abscess  directly adjacent to 1 of the diverticula in the area of  inflammation..      Aorta and vena cava are normal in size. There are no pathologically  enlarged retroperitoneal, iliac or inguinal lymph nodes identified.      Urinary bladder is normal in appearance with no wall thickening.      Uterus is normal in size. No adnexal mass is identified.      No abdominal wall hernia is identified.      Degenerative changes are noted at multiple levels in the spine.  Minimal grade 1 anterolisthesis of L4 on L5 is associated with facet  arthropathy but no spondylolysis.      Impression: Diverticulitis of mid sigmoid colon with 1.5 cm peridiverticular  abscess          MACRO:  None.      Signed by: Britni Saldana 12/16/2023 4:31 PM  Dictation workstation:   [id number]      Meds  Scheduled medications  amLODIPine, 5 mg, oral, Daily  aspirin, 81 mg, oral, Once per day on Mon Wed Fri  ciprofloxacin, 400 mg, intravenous, q12h  clopidogrel, 75 mg, oral, Daily  enoxaparin, 40 mg, subcutaneous, q24h  ezetimibe, 10 mg, oral, Nightly  latanoprost, 1 drop, Both Eyes, Nightly  metoprolol succinate XL, 25 mg, oral, Daily  metroNIDAZOLE, 500 mg, intravenous, Once  metroNIDAZOLE, 500 mg, intravenous, q8h  pantoprazole, 40 mg, oral, Daily before breakfast  penciclovir, 1 Application, Topical, q2h while awake  polyethylene glycol, 17 g, oral, Daily  ranolazine, 500 mg, oral, q12h  rosuvastatin, 5 mg, oral, Daily      Continuous medications     PRN medications  PRN medications: acetaminophen, nitroglycerin, ondansetron, oxyCODONE, oxyCODONE     ASSESSMENT/PLAN    Active Issues  #Acute diverticulitis with adjacent 1.5 cm diverticular abscess  - Patient presented to the ED with abdominal pain, constipation, nausea after eating,  and poor PO intake  - At Dr. Alegre's office, she was found to have rebound and guarding on physical exam, prompting her to come to the ER  - CT abdomen and pelvis showed diverticulitis of the mid sigmoid colon with a 1.5 cm peridiverticular abscess  - ED discussed case with Dr. Huerta, who is recommending Abx and supportive management at this time with no need for surgical intervention  - S/p 1L NS bolus, cipro and metronidazole in ED  - Continuing ciprofloxacin and metronidazole (12/16 - )  - Zofran PRN for nausea control  - PO Protonix 40 mg daily  - Pain regimen: Tylenol 975 q6h PRN mild pain, oxy 2.5 Q6h PRN moderate pain, oxy 5 q6h PRN severe pain  - Miralax daily for constipation  - Starting on soft diet, will advance as tolerated  - General surgery consulted  -- suspected small abscess - no CT scan for now   - advancing diet as tolerating     #UTI  - Patient reports a 3-4 day history of intermittent dysuria and self reported darker colored urine  - UA positive for Hazy urine, +1 ketones, +3 leuks, 50+ WBCs, +1 bacteriuria, +1 hyaline casts  - Cipro for diverticulitis should cover potential UTI  - Urine cultures in process, x1 neg (12/16)  -12/18 - Currently on IV abx that will provide uti coverage.      Chronic Issues  #HTN  #CAD  - Continue home amlodipine 5 mg daily  - Continue home aspirin 81 mg MWF  - Continue home Plavix 75 mg daily  - Continue home metoprolol succinate 25 mg daily  - Continue home ranolazine 500 mg BID  - Continue home nitroglycerin 0.4 every 5 min PRN for chest pain     #HLD  - Continue home ezetimibe 10 mg nightly  - Continue home rosuvastatin 5 mg daily     Fluids: S/p 1L NS in ED  Electrolytes: Replete as needed  Nutrition: Soft diet  GI Ppx: Protonix  DVT Ppx: Lovenox     Oxygen: None  Antibiotics: Ciprofloxacin and metronidazole (12/16 - )     Dispo: 83 year old female admitted for acute diverticulitis with adjacent diverticular abscess. ED contacted Dr. Huerta, recommending  antibiotics and supportive management at this time. Starting ciprofloxacin and metronidazole. Zofran for nausea control, Protonix, and pain regimen in place. Gen surg following - no CT scan. Advance diet as tolerating. Plan for discharge tomorrow    Alfred Box DO  Internal Medicine, PGY-I

## 2023-12-19 VITALS
TEMPERATURE: 96.1 F | RESPIRATION RATE: 18 BRPM | OXYGEN SATURATION: 96 % | WEIGHT: 187.39 LBS | BODY MASS INDEX: 31.99 KG/M2 | HEART RATE: 71 BPM | DIASTOLIC BLOOD PRESSURE: 64 MMHG | HEIGHT: 64 IN | SYSTOLIC BLOOD PRESSURE: 134 MMHG

## 2023-12-19 LAB
ALBUMIN SERPL BCP-MCNC: 3.4 G/DL (ref 3.4–5)
ANION GAP SERPL CALC-SCNC: 14 MMOL/L (ref 10–20)
BASOPHILS # BLD AUTO: 0.02 X10*3/UL (ref 0–0.1)
BASOPHILS NFR BLD AUTO: 0.5 %
BUN SERPL-MCNC: 7 MG/DL (ref 6–23)
CALCIUM SERPL-MCNC: 8.8 MG/DL (ref 8.6–10.3)
CHLORIDE SERPL-SCNC: 105 MMOL/L (ref 98–107)
CO2 SERPL-SCNC: 24 MMOL/L (ref 21–32)
CREAT SERPL-MCNC: 0.95 MG/DL (ref 0.5–1.05)
EOSINOPHIL # BLD AUTO: 0.21 X10*3/UL (ref 0–0.4)
EOSINOPHIL NFR BLD AUTO: 5 %
ERYTHROCYTE [DISTWIDTH] IN BLOOD BY AUTOMATED COUNT: 12.7 % (ref 11.5–14.5)
GFR SERPL CREATININE-BSD FRML MDRD: 60 ML/MIN/1.73M*2
GLUCOSE SERPL-MCNC: 102 MG/DL (ref 74–99)
HCT VFR BLD AUTO: 33.8 % (ref 36–46)
HGB BLD-MCNC: 10.8 G/DL (ref 12–16)
IMM GRANULOCYTES # BLD AUTO: 0.01 X10*3/UL (ref 0–0.5)
IMM GRANULOCYTES NFR BLD AUTO: 0.2 % (ref 0–0.9)
LYMPHOCYTES # BLD AUTO: 1.09 X10*3/UL (ref 0.8–3)
LYMPHOCYTES NFR BLD AUTO: 26.1 %
MAGNESIUM SERPL-MCNC: 1.96 MG/DL (ref 1.6–2.4)
MCH RBC QN AUTO: 29.1 PG (ref 26–34)
MCHC RBC AUTO-ENTMCNC: 32 G/DL (ref 32–36)
MCV RBC AUTO: 91 FL (ref 80–100)
MONOCYTES # BLD AUTO: 0.43 X10*3/UL (ref 0.05–0.8)
MONOCYTES NFR BLD AUTO: 10.3 %
NEUTROPHILS # BLD AUTO: 2.42 X10*3/UL (ref 1.6–5.5)
NEUTROPHILS NFR BLD AUTO: 57.9 %
NRBC BLD-RTO: 0 /100 WBCS (ref 0–0)
PHOSPHATE SERPL-MCNC: 3.9 MG/DL (ref 2.5–4.9)
PLATELET # BLD AUTO: 310 X10*3/UL (ref 150–450)
POTASSIUM SERPL-SCNC: 3.6 MMOL/L (ref 3.5–5.3)
RBC # BLD AUTO: 3.71 X10*6/UL (ref 4–5.2)
SODIUM SERPL-SCNC: 139 MMOL/L (ref 136–145)
WBC # BLD AUTO: 4.2 X10*3/UL (ref 4.4–11.3)

## 2023-12-19 PROCEDURE — 85025 COMPLETE CBC W/AUTO DIFF WBC: CPT

## 2023-12-19 PROCEDURE — 2500000004 HC RX 250 GENERAL PHARMACY W/ HCPCS (ALT 636 FOR OP/ED)

## 2023-12-19 PROCEDURE — 2500000001 HC RX 250 WO HCPCS SELF ADMINISTERED DRUGS (ALT 637 FOR MEDICARE OP)

## 2023-12-19 PROCEDURE — 99239 HOSP IP/OBS DSCHRG MGMT >30: CPT | Performed by: INTERNAL MEDICINE

## 2023-12-19 PROCEDURE — 36415 COLL VENOUS BLD VENIPUNCTURE: CPT

## 2023-12-19 PROCEDURE — 80069 RENAL FUNCTION PANEL: CPT

## 2023-12-19 PROCEDURE — 83735 ASSAY OF MAGNESIUM: CPT

## 2023-12-19 PROCEDURE — 99231 SBSQ HOSP IP/OBS SF/LOW 25: CPT | Performed by: SURGERY

## 2023-12-19 RX ORDER — CIPROFLOXACIN 500 MG/1
500 TABLET ORAL 2 TIMES DAILY
Qty: 14 TABLET | Refills: 0 | Status: SHIPPED | OUTPATIENT
Start: 2023-12-19 | End: 2023-12-19 | Stop reason: SDUPTHER

## 2023-12-19 RX ORDER — POLYETHYLENE GLYCOL 3350 17 G/17G
17 POWDER, FOR SOLUTION ORAL DAILY
Qty: 60 PACKET | Refills: 0 | Status: SHIPPED | OUTPATIENT
Start: 2023-12-20 | End: 2023-12-19 | Stop reason: SDUPTHER

## 2023-12-19 RX ORDER — CIPROFLOXACIN 500 MG/1
500 TABLET ORAL 2 TIMES DAILY
Qty: 14 TABLET | Refills: 0 | Status: SHIPPED | OUTPATIENT
Start: 2023-12-19 | End: 2023-12-26

## 2023-12-19 RX ORDER — POLYETHYLENE GLYCOL 3350 17 G/17G
17 POWDER, FOR SOLUTION ORAL DAILY
Qty: 60 PACKET | Refills: 0 | Status: SHIPPED | OUTPATIENT
Start: 2023-12-20 | End: 2024-02-18

## 2023-12-19 RX ORDER — METRONIDAZOLE 500 MG/1
500 TABLET ORAL 3 TIMES DAILY
Qty: 21 TABLET | Refills: 0 | Status: SHIPPED | OUTPATIENT
Start: 2023-12-19 | End: 2023-12-26

## 2023-12-19 RX ORDER — METRONIDAZOLE 250 MG/1
250 TABLET ORAL 3 TIMES DAILY
Qty: 21 TABLET | Refills: 0 | Status: SHIPPED | OUTPATIENT
Start: 2023-12-19 | End: 2023-12-19 | Stop reason: SDUPTHER

## 2023-12-19 RX ADMIN — RANOLAZINE 500 MG: 500 TABLET, FILM COATED, EXTENDED RELEASE ORAL at 08:25

## 2023-12-19 RX ADMIN — PENCICLOVIR 1 APPLICATION: 10 CREAM TOPICAL at 10:30

## 2023-12-19 RX ADMIN — AMLODIPINE BESYLATE 5 MG: 5 TABLET ORAL at 08:24

## 2023-12-19 RX ADMIN — CLOPIDOGREL 75 MG: 75 TABLET ORAL at 08:24

## 2023-12-19 RX ADMIN — CIPROFLOXACIN 400 MG: 2 INJECTION, SOLUTION INTRAVENOUS at 06:39

## 2023-12-19 RX ADMIN — PENCICLOVIR 1 APPLICATION: 10 CREAM TOPICAL at 08:27

## 2023-12-19 RX ADMIN — PENCICLOVIR 1 APPLICATION: 10 CREAM TOPICAL at 06:40

## 2023-12-19 RX ADMIN — POLYETHYLENE GLYCOL 3350 17 G: 17 POWDER, FOR SOLUTION ORAL at 08:25

## 2023-12-19 RX ADMIN — METOPROLOL SUCCINATE 25 MG: 25 TABLET, EXTENDED RELEASE ORAL at 08:25

## 2023-12-19 RX ADMIN — PANTOPRAZOLE SODIUM 40 MG: 40 TABLET, DELAYED RELEASE ORAL at 06:39

## 2023-12-19 RX ADMIN — METRONIDAZOLE 500 MG: 500 INJECTION, SOLUTION INTRAVENOUS at 10:29

## 2023-12-19 RX ADMIN — METRONIDAZOLE 500 MG: 500 INJECTION, SOLUTION INTRAVENOUS at 03:07

## 2023-12-19 ASSESSMENT — COGNITIVE AND FUNCTIONAL STATUS - GENERAL
MOBILITY SCORE: 24
DAILY ACTIVITIY SCORE: 22
HELP NEEDED FOR BATHING: A LITTLE
DRESSING REGULAR LOWER BODY CLOTHING: A LITTLE

## 2023-12-19 ASSESSMENT — PAIN - FUNCTIONAL ASSESSMENT: PAIN_FUNCTIONAL_ASSESSMENT: 0-10

## 2023-12-19 ASSESSMENT — PAIN SCALES - GENERAL: PAINLEVEL_OUTOF10: 0 - NO PAIN

## 2023-12-19 NOTE — CARE PLAN
Uneventful night. Pt rested comfortably. No c/o pain this shift. Pt tolerating clear liquids well. Probable DC home today as long as pt can tolerate advanced diet. Pt continues to progress towards meeting goals. VSS. Will continue to monitor.

## 2023-12-19 NOTE — PROGRESS NOTES
"Shanika Diaz is a 83 y.o. female on day 3 of admission presenting with Acute diverticulitis.    Subjective   Patient sitting up in bed and appears very comfortable.  Visiting with her .       Objective vital signs are stable.    Physical Exam abdomen is flat soft and completely nontender today.    Last Recorded Vitals  Blood pressure 134/64, pulse 71, temperature 35.6 °C (96.1 °F), temperature source Temporal, resp. rate 18, height 1.626 m (5' 4\"), weight 85 kg (187 lb 6.3 oz), SpO2 96 %.  Intake/Output last 3 Shifts:  I/O last 3 completed shifts:  In: 700 (8.2 mL/kg) [IV Piggyback:700]  Out: - (0 mL/kg)   Weight: 85 kg     Relevant Results                             Assessment/Plan   Principal Problem:    Acute diverticulitis    Patient clinically has done very well her pain has resolved and her laboratories appear normal.  The patient can be discharged on a full liquid diet with instructions to advance to a low residue diet.  She is to continue low residue at home for 4 weeks and follow-up with me in 2 weeks or so in the office.  At that point in time we will likely obtain a repeat CT scan of the abdomen and pelvis.  We may also want to consider a future colonoscopy.  Her last colonoscopy was in 2017.       I spent 10 minutes in the professional and overall care of this patient.      Jojo Huerta MD      "

## 2023-12-19 NOTE — CARE PLAN
The clinical goals for the shift include Patient will have abdominal pain controlled throughout shift

## 2023-12-19 NOTE — ED PROVIDER NOTES
HPI   Chief Complaint   Patient presents with    Abdominal Pain       83-year-old female here for chief complaint of abdominal pain that started 10 days ago progressively getting worse.  She has constipation as well.  She does have a history of diverticulitis.  She has pain all over but mostly left lower quadrant pain.  Tender with palpation she states.  Denies fever chills or night sweats at this time.    10 point review of systems reviewed and is negative                          No data recorded                Patient History   Past Medical History:   Diagnosis Date    Atherosclerotic heart disease of native coronary artery without angina pectoris     Coronary artery disease without angina pectoris, unspecified vessel or lesion type, unspecified whether native or transplanted heart    Hypertension     Personal history of other diseases of the respiratory system 02/02/2019    History of sore throat    Personal history of other specified conditions     History of chest pain     Past Surgical History:   Procedure Laterality Date    CARDIAC SURGERY      CATARACT EXTRACTION  09/15/2014    Cataract Surgery    CORONARY ANGIOPLASTY WITH STENT PLACEMENT  10/04/2016    LAD    OTHER SURGICAL HISTORY  09/15/2014    External Auditory Canal Surgery    OTHER SURGICAL HISTORY  12/12/2016    Cath Stent Placement Left Anterior Descending Artery    TONSILLECTOMY  09/15/2014    Tonsillectomy     Family History   Problem Relation Name Age of Onset    Arthritis Mother      Hyperlipidemia Mother      Cerebral aneurysm Father      Atrial fibrillation Brother          Chronic    Other (Cardiac disorder) Brother      Hypertension Brother      Lymphoma Brother       Social History     Tobacco Use    Smoking status: Never    Smokeless tobacco: Never   Vaping Use    Vaping Use: Never used   Substance Use Topics    Alcohol use: Yes     Alcohol/week: 14.0 standard drinks of alcohol     Types: 14 Glasses of wine per week     Comment: Once or  twice a week 2 drinks wine or beer    Drug use: Never       Physical Exam   ED Triage Vitals   Temp Heart Rate Resp BP   12/16/23 1511 12/16/23 1511 12/16/23 1511 12/16/23 1511   36.7 °C (98.1 °F) 85 18 159/85      SpO2 Temp Source Heart Rate Source Patient Position   12/16/23 1511 12/16/23 1511 12/16/23 1511 12/16/23 1545   97 % Tympanic Monitor Sitting      BP Location FiO2 (%)     12/16/23 1545 --     Right arm        Physical Exam  Vitals and nursing note reviewed.   Constitutional:       Appearance: Normal appearance.   HENT:      Head: Normocephalic and atraumatic.      Nose: Nose normal.      Mouth/Throat:      Mouth: Mucous membranes are moist.   Eyes:      Extraocular Movements: Extraocular movements intact.      Pupils: Pupils are equal, round, and reactive to light.   Cardiovascular:      Rate and Rhythm: Normal rate and regular rhythm.   Pulmonary:      Effort: Pulmonary effort is normal.      Breath sounds: Normal breath sounds.   Abdominal:      General: Abdomen is flat.      Palpations: Abdomen is soft.      Tenderness: There is abdominal tenderness in the left lower quadrant.   Musculoskeletal:         General: Normal range of motion.      Cervical back: Normal range of motion.   Skin:     General: Skin is warm and dry.      Capillary Refill: Capillary refill takes less than 2 seconds.   Neurological:      General: No focal deficit present.      Mental Status: She is alert.   Psychiatric:         Mood and Affect: Mood normal.         ED Course & MDM   Diagnoses as of 12/19/23 0211   Acute diverticulitis       Medical Decision Making      Medical Decision Making: Patient was worked up and lab work is showing a normal white blood cell count, urinary tract infection.  CT scan shows diverticulitis of the mid sigmoid colon with a 1.5 cm peridiverticular abscess.  At this time these findings were discussed with surgery we recommend hospitalization.  Cipro and Flagyl IV were ordered.  The patient will be  hospitalized under the hospitalist service for further management and treatment.  [unfilled]     EKG interpreted by myself (ED attending physician): N/A    Differential Diagnoses Considered: Appendicitis diverticulitis ileus    Chronic Medical Conditions Significantly Affecting Care: N/A    External Records Reviewed: I reviewed recent and relevant outside records including: Previous lab work    Social Determinants of Health Significantly Affecting Care: Lives with     Diagnostic testing considered: Ultrasound    Ofe Bonds D.O.  Emergency Medicine          Procedure  Procedures     Ofe Bonds,   12/19/23 0217

## 2023-12-19 NOTE — CARE PLAN
The patient's goals for the shift include  tolerating advanced diet    The clinical goals for the shift include Patient will utilize the call light when needing assistance to maintain safety      Problem: Pain - Adult  Goal: Verbalizes/displays adequate comfort level or baseline comfort level  Outcome: Progressing     Problem: Safety - Adult  Goal: Free from fall injury  Outcome: Progressing     Problem: Discharge Planning  Goal: Discharge to home or other facility with appropriate resources  Outcome: Progressing     Problem: Chronic Conditions and Co-morbidities  Goal: Patient's chronic conditions and co-morbidity symptoms are monitored and maintained or improved  Outcome: Progressing     Problem: Fall/Injury  Goal: Not fall by end of shift  Outcome: Progressing  Goal: Be free from injury by end of the shift  Outcome: Progressing  Goal: Verbalize understanding of personal risk factors for fall in the hospital  Outcome: Progressing  Goal: Verbalize understanding of risk factor reduction measures to prevent injury from fall in the home  Outcome: Progressing  Goal: Use assistive devices by end of the shift  Outcome: Progressing  Goal: Pace activities to prevent fatigue by end of the shift  Outcome: Progressing     Problem: Pain  Goal: Takes deep breaths with improved pain control throughout the shift  Outcome: Progressing  Goal: Turns in bed with improved pain control throughout the shift  Outcome: Progressing  Goal: Walks with improved pain control throughout the shift  Outcome: Progressing  Goal: Performs ADL's with improved pain control throughout shift  Outcome: Progressing  Goal: Participates in PT with improved pain control throughout the shift  Outcome: Progressing  Goal: Free from opioid side effects throughout the shift  Outcome: Progressing  Goal: Free from acute confusion related to pain meds throughout the shift  Outcome: Progressing     Problem: Nutrition  Goal: Less than 5 days NPO/clear  liquids  Outcome: Progressing  Goal: Oral intake greater than 50%  Outcome: Progressing  Goal: Oral intake greater 75%  Outcome: Progressing  Goal: Consume prescribed supplement  Outcome: Progressing  Goal: Adequate PO fluid intake  Outcome: Progressing  Goal: Nutrition support goals are met within 48 hrs  Outcome: Progressing  Goal: Nutrition support is meeting 75% of nutrient needs  Outcome: Progressing  Goal: Tube feed tolerance  Outcome: Progressing  Goal: BG  mg/dL  Outcome: Progressing  Goal: Lab values WNL  Outcome: Progressing  Goal: Electrolytes WNL  Outcome: Progressing  Goal: Promote healing  Outcome: Progressing  Goal: Maintain stable weight  Outcome: Progressing  Goal: Reduce weight from edema/fluid  Outcome: Progressing  Goal: Gradual weight gain  Outcome: Progressing  Goal: Improve ostomy output  Outcome: Progressing

## 2023-12-19 NOTE — DISCHARGE SUMMARY
Discharge Diagnosis  Acute diverticulitis with 1.5cm diverticular abscess  UTI    Issues Requiring Follow-Up  If symptomatic, repeat CT abdomen/pelvis to monitor diverticular abscess.   Continue miralax daily for constipation.  Low residual diet for 4-6 weeks after discharge  Cipro 500mg BID for 10 day total course.  Flagyl 500mg TID for 10 day total course.   Follow up with Dr. Huerta in 2 weeks.     Discharge Meds     Your medication list        START taking these medications        Instructions Last Dose Given Next Dose Due   ciprofloxacin 500 mg tablet  Commonly known as: Cipro      Take 1 tablet (500 mg) by mouth 2 times a day for 7 days.       metroNIDAZOLE 500 mg tablet  Commonly known as: Flagyl      Take 1 tablet (500 mg) by mouth 3 times a day for 7 days.       polyethylene glycol 17 gram packet  Commonly known as: Glycolax, Miralax  Start taking on: December 20, 2023      Take 17 g by mouth once daily. Do not start before December 20, 2023.              CONTINUE taking these medications        Instructions Last Dose Given Next Dose Due   amLODIPine 5 mg tablet  Commonly known as: Norvasc      Take 1 tablet (5 mg) by mouth once daily.       aspirin 81 mg EC tablet           cholecalciferol 25 MCG (1000 UT) tablet  Commonly known as: Vitamin D-3           clopidogrel 75 mg tablet  Commonly known as: Plavix           ezetimibe 10 mg tablet  Commonly known as: Zetia           latanoprost 0.005 % ophthalmic solution  Commonly known as: Xalatan           metoprolol succinate XL 25 mg 24 hr tablet  Commonly known as: Toprol-XL           nitroglycerin 0.4 mg SL tablet  Commonly known as: Nitrostat           ranolazine 500 mg 12 hr tablet  Commonly known as: Ranexa           rosuvastatin 5 mg tablet  Commonly known as: Crestor           Xeomin 50 Units recon soln  Generic drug: [id number]                     Where to Get Your Medications        These medications were sent to [id number] -  [place], [place] - [address]  [address] [place] [address] [address]      Phone: [phone]   ciprofloxacin 500 mg tablet  metroNIDAZOLE 500 mg tablet  polyethylene glycol 17 gram packet         Test Results Pending At Discharge  Pending Labs       No current pending labs.            Hospital Course  Shanika Diaz is a 83 y.o. female with a PMH of HTN, HLD, CAD, anxiety, GERD, IBS, diverticulosis and diverticulitis who presented to the ED from Dr. Alegre's office with worsening abdominal pain, constipation, nausea, and decreased PO intake in the past 2 days, and is admitted for acute diverticulitis with adjacent diverticular abscess. Gen surg consulted.  Patient had a CT abdomen pelvis with contrast that showed diverticulitis of the mid sigmoid colon with adjacent 1.5 cm peridiverticular abscess.  Patient was given 1 L normal saline bolus, started patient on ciprofloxacin and metronidazole.  General surgery saw the patient and wants to continue n.p.o. and bowel rest with serial abdominal examinations and IV antibiotics for now. No repeat CT planned due to small suspected abscess. Diet advanced to full liquid on 12/19. Low residual diet for 12/20 for the next 4-6 weeks.     Pertinent Physical Exam At Time of Discharge  Physical Exam  Vitals reviewed.   Constitutional:       General: She is not in acute distress.  HENT:      Mouth/Throat:      Mouth: Mucous membranes are moist.   Cardiovascular:      Rate and Rhythm: Normal rate and regular rhythm.      Heart sounds: Normal heart sounds. No murmur heard.     No gallop.   Pulmonary:      Effort: Pulmonary effort is normal.      Breath sounds: Normal breath sounds.   Abdominal:      General: Abdomen is flat. There is no distension.      Palpations: Abdomen is soft.      Tenderness: There is no abdominal tenderness.   Skin:     General: Skin is warm and dry.   Neurological:      Mental Status: She is alert. Mental status is at baseline.   Psychiatric:         Mood and  Affect: Mood normal.         Behavior: Behavior normal.         Outpatient Follow-Up  Future Appointments   Date Time Provider Department Center   12/22/2023  2:00 PM Mustapha Basurto PA-C [id number]   3/25/2024  1:00 PM [id number]   3/25/2024  2:00 PM Johnathan Espinosa [id number]         Alfred Box DO  Internal Medicine, PGY-I

## 2023-12-19 NOTE — DISCHARGE INSTRUCTIONS
The following recommendations are made for you at time of hospital discharge:  -Please take Cipro 500 mg by mouth 2 times a day for 7 days starting 7 PM today.  -Please take metronidazole 250 mg by mouth 3 times a day for 7 days start 7 PM today.  -Please take 17 g of MiraLAX daily.  -If your symptoms worsen, please follow up with your PCP.   -Please continue with a low fiber diet 4-6 weeks. Information have been provided.   -Follow up with Dr. Huerta in 2 weeks.     Please take your home medications as instructed prior to hospitalization.  No changes to your home medications have been made during your hospital stay.    Please follow-up with your primary care provider within 5-7 days from time of discharge. Please call your PCP's office to schedule an appointment. You may need to bring photo ID and insurance card to your appointment.     If you experience any worsening symptoms or any new acute concerns arise, please contact your primary care provider to discuss and possibly arrange an appointment. You need to call your doctor or return to the closest ED if you develop any new or concerning symptoms such as fevers, chills, chest pain, shortness of breath, diarrhea, and so forth.  If you cannot get in touch with your primary care provider or severe symptoms are present, please return to nearest emergency room/urgent care for evaluation and treatment.

## 2023-12-20 ENCOUNTER — PATIENT OUTREACH (OUTPATIENT)
Dept: PRIMARY CARE | Facility: CLINIC | Age: 83
End: 2023-12-20
Payer: MEDICARE

## 2023-12-20 NOTE — PROGRESS NOTES
Discharge Facility: [facility]   Discharge Diagnosis: Acute diverticulitis with 1.5cm diverticular abscess  UTI  Admission Date: 12-16-23  Discharge Date: 12-19-23    PCP Appointment Date: Message routed to office   Specialist Appointment Date: Cardiology 3-25-24   Hospital Encounter and Summary: Linked   See discharge assessment below for further details    Pt. Canceled sleep study due to not feeling well enough for it at this time.       Engagement  Call Start Time: 1117 (12/20/2023 11:21 AM)    Medications  Medications reviewed with patient/caregiver?: Yes (12/20/2023 11:21 AM)  Is the patient having any side effects they believe may be caused by any medication additions or changes?: No (12/20/2023 11:21 AM)  Does the patient have all medications ordered at discharge?: Yes (12/20/2023 11:21 AM)  Care Management Interventions: No intervention needed (12/20/2023 11:21 AM)  Is the patient taking all medications as directed (includes completed medication regime)?: Yes (12/20/2023 11:21 AM)  Care Management Interventions: Provided patient education (12/20/2023 11:21 AM)    Appointments  Does the patient have a primary care provider?: Yes (12/20/2023 11:21 AM)  Care Management Interventions: Advised patient to make appointment (12/20/2023 11:21 AM)    Self Management  Has home health visited the patient within 72 hours of discharge?: Not applicable (12/20/2023 11:21 AM)    Patient Teaching  Does the patient have access to their discharge instructions?: Yes (12/20/2023 11:21 AM)  Care Management Interventions: Reviewed instructions with patient (12/20/2023 11:21 AM)  What is the patient's perception of their health status since discharge?: Improving (12/20/2023 11:21 AM)  Is the patient/caregiver able to teach back the hierarchy of who to call/visit for symptoms/problems? PCP, Specialist, Home Health nurse, Urgent Care, ED, 911: Yes (12/20/2023 11:21 AM)      Timothy Garduno LPN

## 2023-12-21 ENCOUNTER — TELEPHONE (OUTPATIENT)
Dept: PRIMARY CARE | Facility: CLINIC | Age: 83
End: 2023-12-21
Payer: MEDICARE

## 2023-12-21 DIAGNOSIS — R11.0 MODERATE NAUSEA: ICD-10-CM

## 2023-12-21 NOTE — SIGNIFICANT EVENT
Follow Up Phone Call    Outgoing phone call    Spoke to: Shanika Diaz Relationship:self   Phone number: [phone]      Outcome: I left a message on answering machine   Chief Complaint   Patient presents with   • Abdominal Pain          Diagnosis:Not applicable

## 2023-12-21 NOTE — TELEPHONE ENCOUNTER
Pt called in stating she was seen on Saturday by Dr. Alegre and sent to the hospital and just got discharged on 12/19/2023 with UTI and diverticulitis. Pt states she is still unable to keep down liquid and is nauseous every time she takes her meds and eats. Pt also states she has lose stool amd still isnt feeling good. Pt is scheduled for a f/u on 01/05/2024. Pt wanting clinical advise

## 2023-12-21 NOTE — TELEPHONE ENCOUNTER
I spoke to the pt and she said she is not having any issues drinking just having nausea when eating or taking meds. I let her know Dr. Beth was gone till tomorrow and she said she can wait till he returns tomorrow. I told her if her symptoms change or get worse to go back to the ER.

## 2023-12-22 ENCOUNTER — APPOINTMENT (OUTPATIENT)
Dept: SLEEP MEDICINE | Facility: CLINIC | Age: 83
End: 2023-12-22
Payer: MEDICARE

## 2023-12-22 RX ORDER — ONDANSETRON 4 MG/1
4 TABLET, FILM COATED ORAL 3 TIMES DAILY
COMMUNITY
End: 2023-12-22 | Stop reason: SDUPTHER

## 2023-12-22 RX ORDER — ONDANSETRON 4 MG/1
4 TABLET, FILM COATED ORAL 3 TIMES DAILY
Qty: 12 TABLET | Refills: 0 | Status: SHIPPED | OUTPATIENT
Start: 2023-12-22 | End: 2024-01-15 | Stop reason: WASHOUT

## 2024-01-02 ENCOUNTER — TELEPHONE (OUTPATIENT)
Dept: PRIMARY CARE | Facility: CLINIC | Age: 84
End: 2024-01-02
Payer: MEDICARE

## 2024-01-03 ENCOUNTER — PATIENT OUTREACH (OUTPATIENT)
Dept: CARE COORDINATION | Facility: CLINIC | Age: 84
End: 2024-01-03
Payer: MEDICARE

## 2024-01-03 NOTE — PROGRESS NOTES
Call regarding appt. with PCP on n/a (1-5-24) after hospitalization.  At time of outreach call the patient feels as if their condition has (improved) since last visit.  Reviewed the PCP appointment with the pt and addressed any questions or concerns.    Pt. States that she is feeling better on this date, negative of chills, shakes, and she is feeling stronger on this date.     Plans to continue with appointment on 1-5-24    Timothy Garduno LPN

## 2024-01-03 NOTE — TELEPHONE ENCOUNTER
Spoke with pt. Today is the first day she has not had any chills, and has just started to eat some bland foods, thinks she is starting to improve. Offerred appt tomorrow instead of waiting until Fri she declined has another appt. Will just come in on Fri advised to ER if worsens or call again and she agreed.

## 2024-01-05 ENCOUNTER — OFFICE VISIT (OUTPATIENT)
Dept: PRIMARY CARE | Facility: CLINIC | Age: 84
End: 2024-01-05
Payer: MEDICARE

## 2024-01-05 VITALS
SYSTOLIC BLOOD PRESSURE: 120 MMHG | BODY MASS INDEX: 31.41 KG/M2 | HEART RATE: 76 BPM | DIASTOLIC BLOOD PRESSURE: 74 MMHG | WEIGHT: 184 LBS | HEIGHT: 64 IN | OXYGEN SATURATION: 98 %

## 2024-01-05 DIAGNOSIS — Z00.00 ROUTINE GENERAL MEDICAL EXAMINATION AT HEALTH CARE FACILITY: ICD-10-CM

## 2024-01-05 DIAGNOSIS — I20.9 ANGINA PECTORIS (CMS-HCC): ICD-10-CM

## 2024-01-05 DIAGNOSIS — I10 BENIGN ESSENTIAL HYPERTENSION: Primary | ICD-10-CM

## 2024-01-05 DIAGNOSIS — E78.2 MIXED HYPERLIPIDEMIA: ICD-10-CM

## 2024-01-05 PROCEDURE — 1126F AMNT PAIN NOTED NONE PRSNT: CPT | Performed by: FAMILY MEDICINE

## 2024-01-05 PROCEDURE — 1170F FXNL STATUS ASSESSED: CPT | Performed by: FAMILY MEDICINE

## 2024-01-05 PROCEDURE — 3074F SYST BP LT 130 MM HG: CPT | Performed by: FAMILY MEDICINE

## 2024-01-05 PROCEDURE — 3078F DIAST BP <80 MM HG: CPT | Performed by: FAMILY MEDICINE

## 2024-01-05 PROCEDURE — 1159F MED LIST DOCD IN RCRD: CPT | Performed by: FAMILY MEDICINE

## 2024-01-05 PROCEDURE — 1111F DSCHRG MED/CURRENT MED MERGE: CPT | Performed by: FAMILY MEDICINE

## 2024-01-05 PROCEDURE — 99214 OFFICE O/P EST MOD 30 MIN: CPT | Performed by: FAMILY MEDICINE

## 2024-01-05 PROCEDURE — 1036F TOBACCO NON-USER: CPT | Performed by: FAMILY MEDICINE

## 2024-01-05 ASSESSMENT — ACTIVITIES OF DAILY LIVING (ADL)
GROCERY_SHOPPING: INDEPENDENT
TAKING_MEDICATION: INDEPENDENT
MANAGING_FINANCES: INDEPENDENT
DRESSING: INDEPENDENT
BATHING: INDEPENDENT
DOING_HOUSEWORK: INDEPENDENT

## 2024-01-05 ASSESSMENT — ENCOUNTER SYMPTOMS
LOSS OF SENSATION IN FEET: 0
APPETITE CHANGE: 0
NAUSEA: 0
DEPRESSION: 0
UNEXPECTED WEIGHT CHANGE: 0
OCCASIONAL FEELINGS OF UNSTEADINESS: 0

## 2024-01-05 ASSESSMENT — PATIENT HEALTH QUESTIONNAIRE - PHQ9
5. POOR APPETITE OR OVEREATING: SEVERAL DAYS
8. MOVING OR SPEAKING SO SLOWLY THAT OTHER PEOPLE COULD HAVE NOTICED. OR THE OPPOSITE, BEING SO FIGETY OR RESTLESS THAT YOU HAVE BEEN MOVING AROUND A LOT MORE THAN USUAL: SEVERAL DAYS
SUM OF ALL RESPONSES TO PHQ9 QUESTIONS 1 AND 2: 3
2. FEELING DOWN, DEPRESSED OR HOPELESS: NOT AT ALL
1. LITTLE INTEREST OR PLEASURE IN DOING THINGS: NEARLY EVERY DAY
SUM OF ALL RESPONSES TO PHQ QUESTIONS 1-9: 14
6. FEELING BAD ABOUT YOURSELF - OR THAT YOU ARE A FAILURE OR HAVE LET YOURSELF OR YOUR FAMILY DOWN: NOT AT ALL
10. IF YOU CHECKED OFF ANY PROBLEMS, HOW DIFFICULT HAVE THESE PROBLEMS MADE IT FOR YOU TO DO YOUR WORK, TAKE CARE OF THINGS AT HOME, OR GET ALONG WITH OTHER PEOPLE: SOMEWHAT DIFFICULT
3. TROUBLE FALLING OR STAYING ASLEEP OR SLEEPING TOO MUCH: NEARLY EVERY DAY
7. TROUBLE CONCENTRATING ON THINGS, SUCH AS READING THE NEWSPAPER OR WATCHING TELEVISION: NEARLY EVERY DAY
4. FEELING TIRED OR HAVING LITTLE ENERGY: NEARLY EVERY DAY
9. THOUGHTS THAT YOU WOULD BE BETTER OFF DEAD, OR OF HURTING YOURSELF: NOT AT ALL

## 2024-01-05 NOTE — PROGRESS NOTES
"Subjective   Patient ID: Shanika Diaz is a 83 y.o. female who presents for Hospital Follow-up (Discharge Facility: [facility] /Discharge Diagnosis: Acute diverticulitis with 1.5cm diverticular abscess/UTI/Admission Date: 12-16-23/Discharge Date: 12-19-23/), Medicare Annual Wellness Visit Initial, and Epistaxis (Nose Bleed) (3 bloody noses in the last week).    Epistaxis (Nose Bleed)     Abdomen is feeling much better without any pain and she is having normal BMs but she would like to advance from her full liquid diet  Feels weak from the illness  Has lost 6 pounds  Humidifier at home broke and they just got it fixed in the last day and it has been very dry    Review of Systems   Constitutional:  Negative for appetite change and unexpected weight change.   HENT:  Positive for nosebleeds.    Eyes:  Negative for visual disturbance.   Gastrointestinal:  Negative for nausea.       Objective   /74   Pulse 76   Ht 1.626 m (5' 4\")   Wt 83.5 kg (184 lb)   SpO2 98%   BMI 31.58 kg/m²     Physical Exam  HENT:      Head: Normocephalic and atraumatic.      Nose: Nose normal.      Mouth/Throat:      Mouth: Mucous membranes are moist.      Pharynx: No oropharyngeal exudate.   Eyes:      Extraocular Movements: Extraocular movements intact.      Conjunctiva/sclera: Conjunctivae normal.      Pupils: Pupils are equal, round, and reactive to light.   Cardiovascular:      Rate and Rhythm: Normal rate and regular rhythm.   Pulmonary:      Effort: Pulmonary effort is normal.   Abdominal:      General: There is no distension.      Palpations: Abdomen is soft.   Musculoskeletal:      Cervical back: Normal range of motion and neck supple.   Lymphadenopathy:      Cervical: No cervical adenopathy.   Neurological:      General: No focal deficit present.      Mental Status: She is alert.   Psychiatric:         Attention and Perception: Attention normal.         Speech: Speech normal.         Behavior: Behavior is cooperative. "         Assessment/Plan   Diagnoses and all orders for this visit:  Benign essential hypertension  Comments:  Treated and controlled  Mixed hyperlipidemia  Comments:  Treated and controlled  Angina pectoris (CMS/HCC)  Comments:  Stable and not symptomatic  Routine general medical examination at health care facility  Comments:  KOFFI HARRY discussed    Slowly advance diet  Reviewed hospital records and labs in detail   present  Recheck 2 months sooner if any issues arise

## 2024-01-08 ENCOUNTER — APPOINTMENT (OUTPATIENT)
Dept: CARDIOLOGY | Facility: HOSPITAL | Age: 84
DRG: 683 | End: 2024-01-08
Payer: MEDICARE

## 2024-01-08 ENCOUNTER — APPOINTMENT (OUTPATIENT)
Dept: RADIOLOGY | Facility: HOSPITAL | Age: 84
DRG: 683 | End: 2024-01-08
Payer: MEDICARE

## 2024-01-08 ENCOUNTER — HOSPITAL ENCOUNTER (INPATIENT)
Facility: HOSPITAL | Age: 84
LOS: 2 days | Discharge: HOME | DRG: 683 | End: 2024-01-10
Attending: EMERGENCY MEDICINE | Admitting: INTERNAL MEDICINE
Payer: MEDICARE

## 2024-01-08 ENCOUNTER — OFFICE VISIT (OUTPATIENT)
Dept: SURGERY | Facility: CLINIC | Age: 84
End: 2024-01-08
Payer: MEDICARE

## 2024-01-08 VITALS
TEMPERATURE: 96.9 F | HEART RATE: 84 BPM | WEIGHT: 175 LBS | SYSTOLIC BLOOD PRESSURE: 159 MMHG | BODY MASS INDEX: 29.88 KG/M2 | OXYGEN SATURATION: 97 % | DIASTOLIC BLOOD PRESSURE: 79 MMHG | HEIGHT: 64 IN

## 2024-01-08 DIAGNOSIS — K57.92 DIVERTICULITIS: Primary | ICD-10-CM

## 2024-01-08 DIAGNOSIS — I10 BENIGN ESSENTIAL HYPERTENSION: ICD-10-CM

## 2024-01-08 DIAGNOSIS — N17.9 ACUTE KIDNEY INJURY (CMS-HCC): Primary | ICD-10-CM

## 2024-01-08 DIAGNOSIS — I16.1 HYPERTENSIVE EMERGENCY: ICD-10-CM

## 2024-01-08 DIAGNOSIS — I10 ESSENTIAL (PRIMARY) HYPERTENSION: ICD-10-CM

## 2024-01-08 DIAGNOSIS — R04.0 EPISTAXIS: ICD-10-CM

## 2024-01-08 LAB
ABO GROUP (TYPE) IN BLOOD: NORMAL
ALBUMIN SERPL BCP-MCNC: 4.3 G/DL (ref 3.4–5)
ALP SERPL-CCNC: 76 U/L (ref 33–136)
ALT SERPL W P-5'-P-CCNC: 12 U/L (ref 7–45)
ANION GAP SERPL CALC-SCNC: 20 MMOL/L (ref 10–20)
ANTIBODY SCREEN: NORMAL
APPEARANCE UR: CLEAR
AST SERPL W P-5'-P-CCNC: 16 U/L (ref 9–39)
BASOPHILS # BLD AUTO: 0.04 X10*3/UL (ref 0–0.1)
BASOPHILS NFR BLD AUTO: 0.6 %
BILIRUB SERPL-MCNC: 0.4 MG/DL (ref 0–1.2)
BILIRUB UR STRIP.AUTO-MCNC: NEGATIVE MG/DL
BUN SERPL-MCNC: 30 MG/DL (ref 6–23)
CALCIUM SERPL-MCNC: 9.5 MG/DL (ref 8.6–10.3)
CHLORIDE SERPL-SCNC: 102 MMOL/L (ref 98–107)
CO2 SERPL-SCNC: 23 MMOL/L (ref 21–32)
COLOR UR: YELLOW
CREAT SERPL-MCNC: 3.02 MG/DL (ref 0.5–1.05)
EGFRCR SERPLBLD CKD-EPI 2021: 15 ML/MIN/1.73M*2
EOSINOPHIL # BLD AUTO: 0.3 X10*3/UL (ref 0–0.4)
EOSINOPHIL NFR BLD AUTO: 4.7 %
ERYTHROCYTE [DISTWIDTH] IN BLOOD BY AUTOMATED COUNT: 12.7 % (ref 11.5–14.5)
FLUAV RNA RESP QL NAA+PROBE: NOT DETECTED
FLUBV RNA RESP QL NAA+PROBE: NOT DETECTED
GLUCOSE SERPL-MCNC: 94 MG/DL (ref 74–99)
GLUCOSE UR STRIP.AUTO-MCNC: NEGATIVE MG/DL
HCT VFR BLD AUTO: 39.3 % (ref 36–46)
HGB BLD-MCNC: 12.5 G/DL (ref 12–16)
IMM GRANULOCYTES # BLD AUTO: 0.02 X10*3/UL (ref 0–0.5)
IMM GRANULOCYTES NFR BLD AUTO: 0.3 % (ref 0–0.9)
INR PPP: 1.1 (ref 0.9–1.1)
KETONES UR STRIP.AUTO-MCNC: NEGATIVE MG/DL
LEUKOCYTE ESTERASE UR QL STRIP.AUTO: ABNORMAL
LYMPHOCYTES # BLD AUTO: 1.92 X10*3/UL (ref 0.8–3)
LYMPHOCYTES NFR BLD AUTO: 30.1 %
MAGNESIUM SERPL-MCNC: 2.26 MG/DL (ref 1.6–2.4)
MCH RBC QN AUTO: 29.1 PG (ref 26–34)
MCHC RBC AUTO-ENTMCNC: 31.8 G/DL (ref 32–36)
MCV RBC AUTO: 92 FL (ref 80–100)
MONOCYTES # BLD AUTO: 0.5 X10*3/UL (ref 0.05–0.8)
MONOCYTES NFR BLD AUTO: 7.8 %
MUCOUS THREADS #/AREA URNS AUTO: NORMAL /LPF
NEUTROPHILS # BLD AUTO: 3.6 X10*3/UL (ref 1.6–5.5)
NEUTROPHILS NFR BLD AUTO: 56.5 %
NITRITE UR QL STRIP.AUTO: NEGATIVE
NRBC BLD-RTO: 0 /100 WBCS (ref 0–0)
PH UR STRIP.AUTO: 6 [PH]
PLATELET # BLD AUTO: 464 X10*3/UL (ref 150–450)
POTASSIUM SERPL-SCNC: 4.3 MMOL/L (ref 3.5–5.3)
PROT SERPL-MCNC: 7 G/DL (ref 6.4–8.2)
PROT UR STRIP.AUTO-MCNC: NEGATIVE MG/DL
PROTHROMBIN TIME: 12 SECONDS (ref 9.8–12.8)
RBC # BLD AUTO: 4.29 X10*6/UL (ref 4–5.2)
RBC # UR STRIP.AUTO: NEGATIVE /UL
RBC #/AREA URNS AUTO: NORMAL /HPF
RH FACTOR (ANTIGEN D): NORMAL
SARS-COV-2 RNA RESP QL NAA+PROBE: NOT DETECTED
SODIUM SERPL-SCNC: 141 MMOL/L (ref 136–145)
SP GR UR STRIP.AUTO: 1.01
TSH SERPL-ACNC: 2.78 MIU/L (ref 0.44–3.98)
UROBILINOGEN UR STRIP.AUTO-MCNC: <2 MG/DL
WBC # BLD AUTO: 6.4 X10*3/UL (ref 4.4–11.3)
WBC #/AREA URNS AUTO: NORMAL /HPF

## 2024-01-08 PROCEDURE — 74176 CT ABD & PELVIS W/O CONTRAST: CPT

## 2024-01-08 PROCEDURE — 83735 ASSAY OF MAGNESIUM: CPT | Performed by: EMERGENCY MEDICINE

## 2024-01-08 PROCEDURE — 1200000002 HC GENERAL ROOM WITH TELEMETRY DAILY

## 2024-01-08 PROCEDURE — 99223 1ST HOSP IP/OBS HIGH 75: CPT | Performed by: INTERNAL MEDICINE

## 2024-01-08 PROCEDURE — 1036F TOBACCO NON-USER: CPT | Performed by: SURGERY

## 2024-01-08 PROCEDURE — 99285 EMERGENCY DEPT VISIT HI MDM: CPT | Performed by: EMERGENCY MEDICINE

## 2024-01-08 PROCEDURE — 1111F DSCHRG MED/CURRENT MED MERGE: CPT | Performed by: SURGERY

## 2024-01-08 PROCEDURE — 87086 URINE CULTURE/COLONY COUNT: CPT | Mod: GEALAB | Performed by: EMERGENCY MEDICINE

## 2024-01-08 PROCEDURE — 2500000004 HC RX 250 GENERAL PHARMACY W/ HCPCS (ALT 636 FOR OP/ED): Performed by: EMERGENCY MEDICINE

## 2024-01-08 PROCEDURE — 86901 BLOOD TYPING SEROLOGIC RH(D): CPT | Performed by: EMERGENCY MEDICINE

## 2024-01-08 PROCEDURE — 74176 CT ABD & PELVIS W/O CONTRAST: CPT | Mod: FOREIGN READ | Performed by: RADIOLOGY

## 2024-01-08 PROCEDURE — 87636 SARSCOV2 & INF A&B AMP PRB: CPT | Performed by: EMERGENCY MEDICINE

## 2024-01-08 PROCEDURE — 99213 OFFICE O/P EST LOW 20 MIN: CPT | Performed by: SURGERY

## 2024-01-08 PROCEDURE — 3077F SYST BP >= 140 MM HG: CPT | Performed by: SURGERY

## 2024-01-08 PROCEDURE — 70450 CT HEAD/BRAIN W/O DYE: CPT | Performed by: RADIOLOGY

## 2024-01-08 PROCEDURE — 1159F MED LIST DOCD IN RCRD: CPT | Performed by: SURGERY

## 2024-01-08 PROCEDURE — 85025 COMPLETE CBC W/AUTO DIFF WBC: CPT | Performed by: EMERGENCY MEDICINE

## 2024-01-08 PROCEDURE — 1126F AMNT PAIN NOTED NONE PRSNT: CPT | Performed by: SURGERY

## 2024-01-08 PROCEDURE — 85610 PROTHROMBIN TIME: CPT | Performed by: EMERGENCY MEDICINE

## 2024-01-08 PROCEDURE — 70450 CT HEAD/BRAIN W/O DYE: CPT

## 2024-01-08 PROCEDURE — 84443 ASSAY THYROID STIM HORMONE: CPT | Performed by: EMERGENCY MEDICINE

## 2024-01-08 PROCEDURE — 84075 ASSAY ALKALINE PHOSPHATASE: CPT | Performed by: EMERGENCY MEDICINE

## 2024-01-08 PROCEDURE — 3078F DIAST BP <80 MM HG: CPT | Performed by: SURGERY

## 2024-01-08 PROCEDURE — 82436 ASSAY OF URINE CHLORIDE: CPT | Mod: GEALAB | Performed by: INTERNAL MEDICINE

## 2024-01-08 PROCEDURE — 93005 ELECTROCARDIOGRAM TRACING: CPT

## 2024-01-08 PROCEDURE — 81001 URINALYSIS AUTO W/SCOPE: CPT | Performed by: EMERGENCY MEDICINE

## 2024-01-08 PROCEDURE — 36415 COLL VENOUS BLD VENIPUNCTURE: CPT | Performed by: EMERGENCY MEDICINE

## 2024-01-08 RX ORDER — NITROGLYCERIN 0.4 MG/1
0.4 TABLET SUBLINGUAL EVERY 5 MIN PRN
Status: DISCONTINUED | OUTPATIENT
Start: 2024-01-08 | End: 2024-01-10 | Stop reason: HOSPADM

## 2024-01-08 RX ORDER — LABETALOL HYDROCHLORIDE 5 MG/ML
20 INJECTION, SOLUTION INTRAVENOUS EVERY 6 HOURS PRN
Status: DISCONTINUED | OUTPATIENT
Start: 2024-01-08 | End: 2024-01-10 | Stop reason: HOSPADM

## 2024-01-08 RX ORDER — AMLODIPINE BESYLATE 10 MG/1
10 TABLET ORAL DAILY
Status: DISCONTINUED | OUTPATIENT
Start: 2024-01-09 | End: 2024-01-10 | Stop reason: HOSPADM

## 2024-01-08 RX ORDER — METOPROLOL SUCCINATE 25 MG/1
25 TABLET, EXTENDED RELEASE ORAL DAILY
Status: DISCONTINUED | OUTPATIENT
Start: 2024-01-09 | End: 2024-01-08

## 2024-01-08 RX ORDER — LATANOPROST 50 UG/ML
1 SOLUTION/ DROPS OPHTHALMIC NIGHTLY
Status: DISCONTINUED | OUTPATIENT
Start: 2024-01-08 | End: 2024-01-10 | Stop reason: HOSPADM

## 2024-01-08 RX ORDER — ASPIRIN 81 MG/1
81 TABLET ORAL 3 TIMES WEEKLY
Status: DISCONTINUED | OUTPATIENT
Start: 2024-01-10 | End: 2024-01-10 | Stop reason: HOSPADM

## 2024-01-08 RX ORDER — POLYETHYLENE GLYCOL 3350 17 G/17G
17 POWDER, FOR SOLUTION ORAL DAILY PRN
Status: DISCONTINUED | OUTPATIENT
Start: 2024-01-08 | End: 2024-01-10 | Stop reason: HOSPADM

## 2024-01-08 RX ORDER — ONDANSETRON HYDROCHLORIDE 2 MG/ML
4 INJECTION, SOLUTION INTRAVENOUS EVERY 8 HOURS PRN
Status: DISCONTINUED | OUTPATIENT
Start: 2024-01-08 | End: 2024-01-10 | Stop reason: HOSPADM

## 2024-01-08 RX ORDER — CLOPIDOGREL BISULFATE 75 MG/1
75 TABLET ORAL
Status: DISCONTINUED | OUTPATIENT
Start: 2024-01-09 | End: 2024-01-10 | Stop reason: HOSPADM

## 2024-01-08 RX ORDER — EZETIMIBE 10 MG/1
10 TABLET ORAL NIGHTLY
Status: DISCONTINUED | OUTPATIENT
Start: 2024-01-08 | End: 2024-01-10 | Stop reason: HOSPADM

## 2024-01-08 RX ORDER — ONDANSETRON 4 MG/1
4 TABLET, FILM COATED ORAL EVERY 8 HOURS PRN
Status: DISCONTINUED | OUTPATIENT
Start: 2024-01-08 | End: 2024-01-10 | Stop reason: HOSPADM

## 2024-01-08 RX ORDER — ONDANSETRON 4 MG/1
4 TABLET, FILM COATED ORAL 3 TIMES DAILY
Status: DISCONTINUED | OUTPATIENT
Start: 2024-01-08 | End: 2024-01-10 | Stop reason: HOSPADM

## 2024-01-08 RX ORDER — ROSUVASTATIN CALCIUM 10 MG/1
5 TABLET, COATED ORAL DAILY
Status: DISCONTINUED | OUTPATIENT
Start: 2024-01-09 | End: 2024-01-10 | Stop reason: HOSPADM

## 2024-01-08 RX ORDER — ACETAMINOPHEN 650 MG/1
650 SUPPOSITORY RECTAL EVERY 4 HOURS PRN
Status: DISCONTINUED | OUTPATIENT
Start: 2024-01-08 | End: 2024-01-10 | Stop reason: HOSPADM

## 2024-01-08 RX ORDER — AMLODIPINE BESYLATE 5 MG/1
5 TABLET ORAL DAILY
Status: DISCONTINUED | OUTPATIENT
Start: 2024-01-09 | End: 2024-01-09

## 2024-01-08 RX ORDER — ACETAMINOPHEN 325 MG/1
650 TABLET ORAL EVERY 4 HOURS PRN
Status: DISCONTINUED | OUTPATIENT
Start: 2024-01-08 | End: 2024-01-10 | Stop reason: HOSPADM

## 2024-01-08 RX ORDER — ACETAMINOPHEN 160 MG/5ML
650 SOLUTION ORAL EVERY 4 HOURS PRN
Status: DISCONTINUED | OUTPATIENT
Start: 2024-01-08 | End: 2024-01-10 | Stop reason: HOSPADM

## 2024-01-08 RX ORDER — AMLODIPINE BESYLATE 5 MG/1
5 TABLET ORAL DAILY
Status: DISCONTINUED | OUTPATIENT
Start: 2024-01-09 | End: 2024-01-08

## 2024-01-08 RX ORDER — METOPROLOL SUCCINATE 25 MG/1
25 TABLET, EXTENDED RELEASE ORAL DAILY
Status: DISCONTINUED | OUTPATIENT
Start: 2024-01-09 | End: 2024-01-10 | Stop reason: HOSPADM

## 2024-01-08 RX ORDER — CHOLECALCIFEROL (VITAMIN D3) 25 MCG
1000 TABLET ORAL DAILY
Status: DISCONTINUED | OUTPATIENT
Start: 2024-01-09 | End: 2024-01-10 | Stop reason: HOSPADM

## 2024-01-08 RX ORDER — POLYETHYLENE GLYCOL 3350 17 G/17G
17 POWDER, FOR SOLUTION ORAL DAILY
Status: DISCONTINUED | OUTPATIENT
Start: 2024-01-09 | End: 2024-01-10 | Stop reason: HOSPADM

## 2024-01-08 RX ADMIN — SODIUM CHLORIDE, POTASSIUM CHLORIDE, SODIUM LACTATE AND CALCIUM CHLORIDE 1000 ML: 600; 310; 30; 20 INJECTION, SOLUTION INTRAVENOUS at 19:21

## 2024-01-08 SDOH — SOCIAL STABILITY: SOCIAL INSECURITY: WERE YOU ABLE TO COMPLETE ALL THE BEHAVIORAL HEALTH SCREENINGS?: YES

## 2024-01-08 SDOH — SOCIAL STABILITY: SOCIAL INSECURITY: DO YOU FEEL ANYONE HAS EXPLOITED OR TAKEN ADVANTAGE OF YOU FINANCIALLY OR OF YOUR PERSONAL PROPERTY?: NO

## 2024-01-08 SDOH — SOCIAL STABILITY: SOCIAL INSECURITY: HAVE YOU HAD THOUGHTS OF HARMING ANYONE ELSE?: NO

## 2024-01-08 SDOH — SOCIAL STABILITY: SOCIAL INSECURITY: HAS ANYONE EVER THREATENED TO HURT YOUR FAMILY OR YOUR PETS?: NO

## 2024-01-08 SDOH — SOCIAL STABILITY: SOCIAL INSECURITY: DO YOU FEEL UNSAFE GOING BACK TO THE PLACE WHERE YOU ARE LIVING?: NO

## 2024-01-08 SDOH — SOCIAL STABILITY: SOCIAL INSECURITY: ARE YOU OR HAVE YOU BEEN THREATENED OR ABUSED PHYSICALLY, EMOTIONALLY, OR SEXUALLY BY ANYONE?: NO

## 2024-01-08 SDOH — SOCIAL STABILITY: SOCIAL INSECURITY: DOES ANYONE TRY TO KEEP YOU FROM HAVING/CONTACTING OTHER FRIENDS OR DOING THINGS OUTSIDE YOUR HOME?: NO

## 2024-01-08 SDOH — SOCIAL STABILITY: SOCIAL INSECURITY: ARE THERE ANY APPARENT SIGNS OF INJURIES/BEHAVIORS THAT COULD BE RELATED TO ABUSE/NEGLECT?: NO

## 2024-01-08 SDOH — SOCIAL STABILITY: SOCIAL INSECURITY: ABUSE: ADULT

## 2024-01-08 ASSESSMENT — COLUMBIA-SUICIDE SEVERITY RATING SCALE - C-SSRS
2. HAVE YOU ACTUALLY HAD ANY THOUGHTS OF KILLING YOURSELF?: NO
1. IN THE PAST MONTH, HAVE YOU WISHED YOU WERE DEAD OR WISHED YOU COULD GO TO SLEEP AND NOT WAKE UP?: NO
6. HAVE YOU EVER DONE ANYTHING, STARTED TO DO ANYTHING, OR PREPARED TO DO ANYTHING TO END YOUR LIFE?: NO
1. IN THE PAST MONTH, HAVE YOU WISHED YOU WERE DEAD OR WISHED YOU COULD GO TO SLEEP AND NOT WAKE UP?: NO
6. HAVE YOU EVER DONE ANYTHING, STARTED TO DO ANYTHING, OR PREPARED TO DO ANYTHING TO END YOUR LIFE?: NO
2. HAVE YOU ACTUALLY HAD ANY THOUGHTS OF KILLING YOURSELF?: NO

## 2024-01-08 ASSESSMENT — ACTIVITIES OF DAILY LIVING (ADL)
JUDGMENT_ADEQUATE_SAFELY_COMPLETE_DAILY_ACTIVITIES: YES
ADEQUATE_TO_COMPLETE_ADL: YES
ASSISTIVE_DEVICE: EYEGLASSES
TOILETING: INDEPENDENT
GROOMING: INDEPENDENT
LACK_OF_TRANSPORTATION: NO
HEARING - RIGHT EAR: HEARING AID
FEEDING YOURSELF: INDEPENDENT
PATIENT'S MEMORY ADEQUATE TO SAFELY COMPLETE DAILY ACTIVITIES?: YES
DRESSING YOURSELF: INDEPENDENT
WALKS IN HOME: INDEPENDENT
BATHING: INDEPENDENT
HEARING - LEFT EAR: HEARING AID

## 2024-01-08 ASSESSMENT — PATIENT HEALTH QUESTIONNAIRE - PHQ9
1. LITTLE INTEREST OR PLEASURE IN DOING THINGS: NOT AT ALL
SUM OF ALL RESPONSES TO PHQ9 QUESTIONS 1 & 2: 2
2. FEELING DOWN, DEPRESSED OR HOPELESS: MORE THAN HALF THE DAYS

## 2024-01-08 ASSESSMENT — COGNITIVE AND FUNCTIONAL STATUS - GENERAL
PATIENT BASELINE BEDBOUND: NO
MOBILITY SCORE: 22
CLIMB 3 TO 5 STEPS WITH RAILING: A LITTLE
WALKING IN HOSPITAL ROOM: A LITTLE
DAILY ACTIVITIY SCORE: 24

## 2024-01-08 ASSESSMENT — LIFESTYLE VARIABLES
REASON UNABLE TO ASSESS: NO
HOW OFTEN DO YOU HAVE A DRINK CONTAINING ALCOHOL: MONTHLY OR LESS
HAVE PEOPLE ANNOYED YOU BY CRITICIZING YOUR DRINKING: NO
EVER HAD A DRINK FIRST THING IN THE MORNING TO STEADY YOUR NERVES TO GET RID OF A HANGOVER: NO
EVER FELT BAD OR GUILTY ABOUT YOUR DRINKING: NO
HOW OFTEN DO YOU HAVE 6 OR MORE DRINKS ON ONE OCCASION: LESS THAN MONTHLY
HOW MANY STANDARD DRINKS CONTAINING ALCOHOL DO YOU HAVE ON A TYPICAL DAY: 1 OR 2
HAVE YOU EVER FELT YOU SHOULD CUT DOWN ON YOUR DRINKING: NO
AUDIT-C TOTAL SCORE: 2
AUDIT-C TOTAL SCORE: 2
SKIP TO QUESTIONS 9-10: 0

## 2024-01-08 ASSESSMENT — PAIN SCALES - GENERAL
PAINLEVEL: 0-NO PAIN
PAINLEVEL_OUTOF10: 0 - NO PAIN

## 2024-01-08 ASSESSMENT — PAIN - FUNCTIONAL ASSESSMENT
PAIN_FUNCTIONAL_ASSESSMENT: 0-10

## 2024-01-09 LAB
ANION GAP SERPL CALC-SCNC: 14 MMOL/L (ref 10–20)
BACTERIA UR CULT: NORMAL
BUN SERPL-MCNC: 27 MG/DL (ref 6–23)
CALCIUM SERPL-MCNC: 9.3 MG/DL (ref 8.6–10.3)
CHLORIDE SERPL-SCNC: 106 MMOL/L (ref 98–107)
CHLORIDE UR-SCNC: 40 MMOL/L
CHLORIDE/CREATININE (MMOL/G) IN URINE: 75 MMOL/G CREAT (ref 38–318)
CO2 SERPL-SCNC: 25 MMOL/L (ref 21–32)
CREAT SERPL-MCNC: 2.73 MG/DL (ref 0.5–1.05)
CREAT UR-MCNC: 53.1 MG/DL (ref 20–320)
EGFRCR SERPLBLD CKD-EPI 2021: 17 ML/MIN/1.73M*2
ERYTHROCYTE [DISTWIDTH] IN BLOOD BY AUTOMATED COUNT: 12.9 % (ref 11.5–14.5)
GLUCOSE SERPL-MCNC: 99 MG/DL (ref 74–99)
HCT VFR BLD AUTO: 34.6 % (ref 36–46)
HGB BLD-MCNC: 10.9 G/DL (ref 12–16)
HOLD SPECIMEN: NORMAL
MCH RBC QN AUTO: 29.1 PG (ref 26–34)
MCHC RBC AUTO-ENTMCNC: 31.5 G/DL (ref 32–36)
MCV RBC AUTO: 92 FL (ref 80–100)
NRBC BLD-RTO: 0 /100 WBCS (ref 0–0)
PLATELET # BLD AUTO: 358 X10*3/UL (ref 150–450)
POTASSIUM SERPL-SCNC: 4.2 MMOL/L (ref 3.5–5.3)
POTASSIUM UR-SCNC: 33 MMOL/L
POTASSIUM/CREAT UR-RTO: 62 MMOL/G CREAT
RBC # BLD AUTO: 3.75 X10*6/UL (ref 4–5.2)
SODIUM SERPL-SCNC: 141 MMOL/L (ref 136–145)
SODIUM UR-SCNC: 37 MMOL/L
SODIUM/CREAT UR-RTO: 70 MMOL/G CREAT
TSH SERPL-ACNC: 2.04 MIU/L (ref 0.44–3.98)
WBC # BLD AUTO: 5.2 X10*3/UL (ref 4.4–11.3)

## 2024-01-09 PROCEDURE — 2500000005 HC RX 250 GENERAL PHARMACY W/O HCPCS: Performed by: INTERNAL MEDICINE

## 2024-01-09 PROCEDURE — 1200000002 HC GENERAL ROOM WITH TELEMETRY DAILY

## 2024-01-09 PROCEDURE — 99233 SBSQ HOSP IP/OBS HIGH 50: CPT | Performed by: STUDENT IN AN ORGANIZED HEALTH CARE EDUCATION/TRAINING PROGRAM

## 2024-01-09 PROCEDURE — 2500000004 HC RX 250 GENERAL PHARMACY W/ HCPCS (ALT 636 FOR OP/ED): Performed by: INTERNAL MEDICINE

## 2024-01-09 PROCEDURE — 99223 1ST HOSP IP/OBS HIGH 75: CPT | Performed by: INTERNAL MEDICINE

## 2024-01-09 PROCEDURE — 85027 COMPLETE CBC AUTOMATED: CPT | Performed by: INTERNAL MEDICINE

## 2024-01-09 PROCEDURE — 36415 COLL VENOUS BLD VENIPUNCTURE: CPT | Performed by: INTERNAL MEDICINE

## 2024-01-09 PROCEDURE — 84443 ASSAY THYROID STIM HORMONE: CPT | Performed by: STUDENT IN AN ORGANIZED HEALTH CARE EDUCATION/TRAINING PROGRAM

## 2024-01-09 PROCEDURE — 2500000001 HC RX 250 WO HCPCS SELF ADMINISTERED DRUGS (ALT 637 FOR MEDICARE OP): Performed by: INTERNAL MEDICINE

## 2024-01-09 PROCEDURE — 80048 BASIC METABOLIC PNL TOTAL CA: CPT | Performed by: INTERNAL MEDICINE

## 2024-01-09 PROCEDURE — 2500000002 HC RX 250 W HCPCS SELF ADMINISTERED DRUGS (ALT 637 FOR MEDICARE OP, ALT 636 FOR OP/ED): Performed by: INTERNAL MEDICINE

## 2024-01-09 RX ADMIN — LATANOPROST 1 DROP: 50 SOLUTION OPHTHALMIC at 20:46

## 2024-01-09 RX ADMIN — Medication 1000 UNITS: at 09:07

## 2024-01-09 RX ADMIN — CLOPIDOGREL 75 MG: 75 TABLET ORAL at 09:07

## 2024-01-09 RX ADMIN — EZETIMIBE 10 MG: 10 TABLET ORAL at 00:34

## 2024-01-09 RX ADMIN — ROSUVASTATIN CALCIUM 5 MG: 10 TABLET, FILM COATED ORAL at 09:07

## 2024-01-09 RX ADMIN — AMLODIPINE BESYLATE 10 MG: 10 TABLET ORAL at 09:07

## 2024-01-09 RX ADMIN — ONDANSETRON HYDROCHLORIDE 4 MG: 4 TABLET, FILM COATED ORAL at 09:09

## 2024-01-09 RX ADMIN — METOPROLOL SUCCINATE 25 MG: 25 TABLET, EXTENDED RELEASE ORAL at 09:09

## 2024-01-09 RX ADMIN — EZETIMIBE 10 MG: 10 TABLET ORAL at 20:46

## 2024-01-09 ASSESSMENT — PAIN - FUNCTIONAL ASSESSMENT
PAIN_FUNCTIONAL_ASSESSMENT: 0-10
PAIN_FUNCTIONAL_ASSESSMENT: 0-10

## 2024-01-09 ASSESSMENT — COGNITIVE AND FUNCTIONAL STATUS - GENERAL
TURNING FROM BACK TO SIDE WHILE IN FLAT BAD: A LITTLE
DRESSING REGULAR UPPER BODY CLOTHING: A LITTLE
MOBILITY SCORE: 24
HELP NEEDED FOR BATHING: A LITTLE
WALKING IN HOSPITAL ROOM: A LITTLE
DRESSING REGULAR LOWER BODY CLOTHING: A LITTLE
CLIMB 3 TO 5 STEPS WITH RAILING: A LITTLE
DAILY ACTIVITIY SCORE: 21
MOVING FROM LYING ON BACK TO SITTING ON SIDE OF FLAT BED WITH BEDRAILS: A LITTLE
MOVING TO AND FROM BED TO CHAIR: A LITTLE
STANDING UP FROM CHAIR USING ARMS: A LITTLE
MOBILITY SCORE: 18
DAILY ACTIVITIY SCORE: 24

## 2024-01-09 ASSESSMENT — PAIN SCALES - GENERAL
PAINLEVEL_OUTOF10: 0 - NO PAIN
PAINLEVEL_OUTOF10: 0 - NO PAIN

## 2024-01-09 NOTE — CONSULTS
Reason For Consult  Acute kidney injury/hypertensive crisis    History Of Present Illness     Shanika Diaz is a 83 y.o. female with a past medical history of hypertension, hyperlipidemia, CAD, anxiety, GERD, IBS, diverticular disease status post recent diverticulitis with abscess in the middle of December 2023 who was admitted with hypertensive urgency/epistaxis/acute kidney injury.  Nephrology was consulted to manage hypertension/acute kidney injury    Patient was seen and examined in his room today.  She is calm and appears in no distress.  She reports being hospitalized few weeks ago with diverticulitis.  She had an outpatient visit 1 day prior to presentation when she was not doing good and blood pressure was noticed to be high and patient was directed to the ED for evaluation.  Patient was admitted for further workup    She reports good urine output-not documented in chart.  She reports good p.o. fluid intake.  No significant NSAID use at home.  No leg swelling or shortness of breath.  She reports brief history of diarrhea.  She was dizzy at home with lightheadedness.  She reports recurrent occasional chills since last hospital admission.  No neurological symptoms or deficits.           Past Medical History  She has a past medical history of Atherosclerotic heart disease of native coronary artery without angina pectoris, Hypertension, Personal history of other diseases of the respiratory system (02/02/2019), and Personal history of other specified conditions.    Surgical History  She has a past surgical history that includes Tonsillectomy (09/15/2014); Other surgical history (09/15/2014); Cataract extraction (09/15/2014); Other surgical history (12/12/2016); Cardiac surgery; and Coronary angioplasty with stent (10/04/2016).     Social History  She reports that she has never smoked. She has never used smokeless tobacco. She reports current alcohol use of about 14.0 standard drinks of alcohol per week. She  reports that she does not use drugs.    Family History  Family History   Problem Relation Name Age of Onset    Arthritis Mother      Hyperlipidemia Mother      Cerebral aneurysm Father      Atrial fibrillation Brother          Chronic    Other (Cardiac disorder) Brother      Hypertension Brother      Lymphoma Brother          Allergies  Sulfa (sulfonamide antibiotics), Codeine, and Penicillin    Review of Systems     Constitutional feeling funny, dizziness, lightheadedness  Eyes: no blurred vision and no diplopia.   ENT epistaxis  Cardiovascular: no chest pain, no palpitations and no lower extremity edema.   Respiratory: no shortness of breath, no chronic cough and no shortness of breath during exertion.   Gastrointestinal: no abdominal pain, no constipation, no heartburn, no vomiting, no bloody stools and no change in bowel movements.   Genitourinary: no dysuria and no hematuria.   Musculoskeletal: no arthralgias and no myalgias.   Skin: no rashes and no skin lesions.   Neurological: no headaches and no dizziness.   Psychiatric: no confusion, no depression and no anxiety.   Endocrine: no heat intolerance, no cold intolerance, appetite not increased, no thyroid disorder, no increased urinary frequency and no dry skin.   Hematologic/Lymphatic: does not bleed easily and does not bruise easily.   All other systems have been reviewed and are negative for complaint.        Physical Exam      General appearance: no distress awake and alert on room air, euvolemic on exam  Eyes: non-icteric  HEENT: atrumatic head, PEERLA, moist mucosa  Skin: no apparent rash  Heart: NSR, S1, S2 normal, no murmur or gallop  Lungs: Symmetrical expansion,CTA bilat no wheezing/crackles  Abdomen: soft, nt/nd, obese  Extremities: no edema bilat  Neuro: No FND,asterixis, no focal deficits noticed           I&O 24HR    Intake/Output Summary (Last 24 hours) at 1/9/2024 104  Last data filed at 1/9/2024 4156  Gross per 24 hour   Intake 1500 ml  "  Output --   Net 1500 ml       Vitals 24HR  Heart Rate:  [73-93]   Temp:  [35.9 °C (96.6 °F)-36.3 °C (97.4 °F)]   Resp:  [16-21]   BP: (136-203)/(73-97)   Height:  [162.6 cm (5' 4\")]   Weight:  [79.4 kg (175 lb)-81.6 kg (179 lb 12.8 oz)]   SpO2:  [95 %-99 %]         Relevant Results  RFP  Recent Labs     01/09/24  0651 01/08/24  1736 12/19/23  0639 12/18/23  0549 12/17/23  0703 12/16/23  1518 04/14/23  1039    141 139 138 137 135* 141   K 4.2 4.3 3.6 3.8 3.9 4.1 4.8    102 105 104 104 100 105   CO2 25 23 24 25 24 25 27   BUN 27* 30* 7 9 12 15 15   CREATININE 2.73* 3.02* 0.95 1.01 0.95 1.03 1.18*   GLUCOSE 99 94 102* 98 101* 83 100*   CALCIUM 9.3 9.5 8.8 9.0 8.9 9.7 10.2   PHOS  --   --  3.9 4.2 3.5  --   --    EGFR 17* 15* 60* 55* 60* 54*  --    ANIONGAP 14 20 14 13 13 14 14        Urineanalysis  Recent Labs     01/08/24  1808 12/16/23  1537 12/16/23  1220 08/07/23  1227   COLORU Yellow Yellow Orange* Yellow   APPEARANCEU Clear Hazy* Clear Clear   SPECGRAVU 1.006 1.014 >=1.030 1.020   THOMAS 6.0 5.0 5.5 6.5   PROTUR NEGATIVE NEGATIVE 30 (1+) NEGATIVE   GLUCOSEU NEGATIVE NEGATIVE NEGATIVE NEGATIVE   BLOODU NEGATIVE NEGATIVE NEGATIVE NEGATIVE   KETONESU NEGATIVE 20 (1+)* NEGATIVE NEGATIVE   BILIRUBINU NEGATIVE NEGATIVE SMALL (1+)* NEGATIVE   NITRITEU NEGATIVE NEGATIVE NEGATIVE NEGATIVE   LEUKOCYTESU TRACE* LARGE (3+)*  --   --        Urine Electrolytes  Recent Labs     01/08/24  1808 12/16/23  1537 12/16/23  1220 08/07/23  1227   PROTUR NEGATIVE NEGATIVE 30 (1+) NEGATIVE        Urine Micro  Recent Labs     01/08/24  1808 12/16/23  1537 11/22/17  1455   WBCU 1-5 >50* 2   RBCU 1-2 3-5 <1   HYALCASTU  --  1+*  --    SQUAMEPIU  --  1-9 (SPARSE) <1   BACTERIAU  --  1+* 1+*   MUCUSU FEW FEW 1+        Iron  Recent Labs     12/17/23  0703   IRON 35   TIBC 277   IRONSAT 13*   FERRITIN 168*       amLODIPine, 10 mg, oral, Daily  [START ON 1/10/2024] aspirin, 81 mg, oral, Once per day on Mon Wed Fri  cholecalciferol, " 1,000 Units, oral, Daily  clopidogrel, 75 mg, oral, Once per day on Tue Thu Sat  ezetimibe, 10 mg, oral, Nightly  latanoprost, 1 drop, Both Eyes, Nightly  metoprolol succinate XL, 25 mg, oral, Daily  ondansetron, 4 mg, oral, TID  polyethylene glycol, 17 g, oral, Daily  rosuvastatin, 5 mg, oral, Daily           Assessment/Plan           Principal Problem:    Acute kidney injury (CMS/HCC)      1-Nonoliguric acute kidney injury.  Within normal baseline serum creatinine.  Most likely hemodynamic injury in setting of hypertensive emergency.  Urinalysis bland with no blood or protein.  CAT scan without contrast was reviewed and showed within normal kidneys with no obstruction    2-hypertensive urgency with epistaxis with blood pressure above 200/100.  Currently blood pressure is stable and in target.  Current medication amlodipine 10 mg, metoprolol XL 25 mg.  Not on RAAS inhibitors or diuretics.  Unknown cause of hypertensive urgency        Recommendation plan  -Will check renal Doppler to rule out renal artery stenosis  -Blood pressure is stable now.  Possibly dropped too rapidly however we will continue same medications  -Continue to avoid RAAS inhibitors/NSAIDs  -No need for IV fluid.  Patient is able to maintain good p.o. intake  -Order urine electrolytes    We will continue to follow    I spent 80 minutes in the professional and overall care of this patient.      Leeanna Estrada MD

## 2024-01-09 NOTE — ED PROVIDER NOTES
HPI   Chief Complaint   Patient presents with    Hypertension     Pt sent to the ER by Dr. Huerta for a CT scan. Pt has been getting frequent bloody nose and has been weaker. Pt takes plavix and aspirin at home. Pt states she just feels very tired.        83-year-old female with history of recent hospitalization for diverticulitis complicated by abscess presents to the ED for generalized weakness.  She reports being discharged from the hospital on antibiotics.  She completed a course of antibiotics.  Since then she been having generalized weakness.  Denies new abdominal pain.  No chest pain or shortness of breath.  Was referred from an outpatient appointment today for evaluation.                          No data recorded                Patient History   Past Medical History:   Diagnosis Date    Atherosclerotic heart disease of native coronary artery without angina pectoris     Coronary artery disease without angina pectoris, unspecified vessel or lesion type, unspecified whether native or transplanted heart    Hypertension     Personal history of other diseases of the respiratory system 02/02/2019    History of sore throat    Personal history of other specified conditions     History of chest pain     Past Surgical History:   Procedure Laterality Date    CARDIAC SURGERY      CATARACT EXTRACTION  09/15/2014    Cataract Surgery    CORONARY ANGIOPLASTY WITH STENT PLACEMENT  10/04/2016    LAD    OTHER SURGICAL HISTORY  09/15/2014    External Auditory Canal Surgery    OTHER SURGICAL HISTORY  12/12/2016    Cath Stent Placement Left Anterior Descending Artery    TONSILLECTOMY  09/15/2014    Tonsillectomy     Family History   Problem Relation Name Age of Onset    Arthritis Mother      Hyperlipidemia Mother      Cerebral aneurysm Father      Atrial fibrillation Brother          Chronic    Other (Cardiac disorder) Brother      Hypertension Brother      Lymphoma Brother       Social History     Tobacco Use    Smoking status:  Never    Smokeless tobacco: Never   Vaping Use    Vaping Use: Never used   Substance Use Topics    Alcohol use: Yes     Alcohol/week: 14.0 standard drinks of alcohol     Types: 14 Glasses of wine per week     Comment: Once or twice a week 2 drinks wine or beer    Drug use: Never       Physical Exam   ED Triage Vitals [01/08/24 1520]   Temp Heart Rate Resp BP   36.1 °C (97 °F) 85 21 149/84      SpO2 Temp Source Heart Rate Source Patient Position   97 % Tympanic Monitor Sitting      BP Location FiO2 (%)     Left arm --       Physical Exam  Vitals and nursing note reviewed.   Constitutional:       Appearance: Normal appearance.   HENT:      Head: Normocephalic and atraumatic.   Eyes:      Extraocular Movements: Extraocular movements intact.      Conjunctiva/sclera: Conjunctivae normal.      Pupils: Pupils are equal, round, and reactive to light.   Cardiovascular:      Rate and Rhythm: Normal rate and regular rhythm.   Pulmonary:      Effort: Pulmonary effort is normal.      Breath sounds: Normal breath sounds.   Abdominal:      General: Abdomen is flat.      Palpations: Abdomen is soft.      Tenderness: There is no abdominal tenderness.   Musculoskeletal:         General: Normal range of motion.      Cervical back: Normal range of motion and neck supple.   Skin:     General: Skin is warm and dry.      Capillary Refill: Capillary refill takes less than 2 seconds.   Neurological:      General: No focal deficit present.      Mental Status: She is alert and oriented to person, place, and time.   Psychiatric:         Mood and Affect: Mood normal.         Behavior: Behavior normal.         Thought Content: Thought content normal.         Judgment: Judgment normal.         ED Course & MDM   Diagnoses as of 01/08/24 2141   Acute kidney injury (CMS/HCC)       Medical Decision Making  EKG:  Sinus rhythm rate 81.  No ST segment elevations or depressions.  Nonspecific T wave abnormalities.    83-year-old female presents to the ED  for generalized weakness Preethi days.  Upon arrival to the ED she appears in no acute distress.  Abdomen soft.  Blood pressure is elevated.  No history of hypertension.  Obtain broad medical workup.  Labs concerning for elevated creatinine.  Has a new SENIA.  CT negative for diverticulitis.  Given hypotension and SENIA recommend hospitalization for renal workup.        Procedure  Procedures     Paul Rodgers MD  01/08/24 9520

## 2024-01-09 NOTE — NURSING NOTE
Pt. NPO after midnight per ultra sound team, Vascular renal ultrasound 1/10. BUN trended down today now 27 and Creat trended down today now 27.

## 2024-01-09 NOTE — PROGRESS NOTES
01/09/24 1042   Discharge Planning   Living Arrangements Spouse/significant other   Support Systems Spouse/significant other   Assistance Needed patient is alert and oriented x3, independent in ADL's, uses no assistive devices, no DME in the home, patient drives and is on room air at baseline   Type of Residence Private residence   Number of Stairs to Enter Residence 2   Number of Stairs Within Residence 0   Do you have animals or pets at home? No   Who is requesting discharge planning? Provider   Home or Post Acute Services None   Patient expects to be discharged to: Home [facility]   Does the patient need discharge transport arranged? Yes   RoundTrip coordination needed? Yes   Has discharge transport been arranged? No

## 2024-01-09 NOTE — CARE PLAN
The patient's goals for the shift include  to not have bloody nose.     The clinical goals for the shift include to get a good night sleep    Over the shift, the patient did not make progress toward the following goals. Barriers to progression include pt still had a blood nose one time, had high blood pressure. Recommendations to address these barriers include possibly cauterize the nose to stop nose bleeds and tweak medications to stop the dizziness.

## 2024-01-09 NOTE — PROGRESS NOTES
Subjective   Patient ID: Shanika Diaz is a 83 y.o. female who presents for New Patient Visit ([facility] FU Diverticulitis).  HPI  Pt admitted before Kavitha with diverticulitis. Went home on antibiotics. No abdominal complaints but overall does not feel well and has had multiple nose bleeds  Review of Systems  Poor appetite but no diarrhea  Objective   Physical ExamHEENt NR  Lungs clear  Heart RRR  Abd completely soft and NT    Assessment/Plan  I recommended she go to the ER for further evaluation           Jojo Huerta MD 01/09/24 9:31 AM

## 2024-01-09 NOTE — PROGRESS NOTES
"Shanika Diaz is a 83 y.o. female on day 1 of admission presenting with Acute kidney injury (CMS/HCC).    Subjective   Pt denies new complaints.        Objective     Physical Exam  Vitals and nursing note reviewed.   Constitutional:       General: She is not in acute distress.     Appearance: Normal appearance. She is not ill-appearing or toxic-appearing.   HENT:      Head: Normocephalic and atraumatic.      Mouth/Throat:      Mouth: Mucous membranes are moist.   Eyes:      Extraocular Movements: Extraocular movements intact.      Conjunctiva/sclera: Conjunctivae normal.      Pupils: Pupils are equal, round, and reactive to light.   Cardiovascular:      Rate and Rhythm: Normal rate and regular rhythm.      Heart sounds: Murmur heard.      No gallop.   Pulmonary:      Effort: Pulmonary effort is normal. No respiratory distress.      Breath sounds: Normal breath sounds. No wheezing, rhonchi or rales.   Abdominal:      General: Abdomen is flat. Bowel sounds are normal. There is no distension.      Palpations: Abdomen is soft. There is no mass.      Tenderness: There is no abdominal tenderness.   Musculoskeletal:         General: No swelling or tenderness. Normal range of motion.      Cervical back: Normal range of motion and neck supple.   Skin:     General: Skin is warm and dry.   Neurological:      General: No focal deficit present.      Mental Status: She is alert and oriented to person, place, and time. Mental status is at baseline.   Psychiatric:         Mood and Affect: Mood normal.         Behavior: Behavior normal.         Thought Content: Thought content normal.         Judgment: Judgment normal.         Last Recorded Vitals:  /74 (BP Location: Left arm, Patient Position: Lying)   Pulse 78   Temp 36.1 °C (97 °F) (Temporal)   Resp 16   Ht 1.626 m (5' 4\")   Wt 81.6 kg (179 lb 12.8 oz)   SpO2 95%   BMI 30.86 kg/m²      Scheduled medications:  amLODIPine, 10 mg, oral, Daily  [START ON 1/10/2024] " aspirin, 81 mg, oral, Once per day on Mon Wed Fri  cholecalciferol, 1,000 Units, oral, Daily  clopidogrel, 75 mg, oral, Once per day on Tue Thu Sat  ezetimibe, 10 mg, oral, Nightly  latanoprost, 1 drop, Both Eyes, Nightly  metoprolol succinate XL, 25 mg, oral, Daily  ondansetron, 4 mg, oral, TID  polyethylene glycol, 17 g, oral, Daily  rosuvastatin, 5 mg, oral, Daily      Continuous medications:     PRN medications:  PRN medications: acetaminophen **OR** acetaminophen **OR** acetaminophen, labetaloL, nitroglycerin, ondansetron **OR** ondansetron, polyethylene glycol     Relevant Results:  Results for orders placed or performed during the hospital encounter of 01/08/24 (from the past 24 hour(s))   CBC and Auto Differential   Result Value Ref Range    WBC 6.4 4.4 - 11.3 x10*3/uL    nRBC 0.0 0.0 - 0.0 /100 WBCs    RBC 4.29 4.00 - 5.20 x10*6/uL    Hemoglobin 12.5 12.0 - 16.0 g/dL    Hematocrit 39.3 36.0 - 46.0 %    MCV 92 80 - 100 fL    MCH 29.1 26.0 - 34.0 pg    MCHC 31.8 (L) 32.0 - 36.0 g/dL    RDW 12.7 11.5 - 14.5 %    Platelets 464 (H) 150 - 450 x10*3/uL    Neutrophils % 56.5 40.0 - 80.0 %    Immature Granulocytes %, Automated 0.3 0.0 - 0.9 %    Lymphocytes % 30.1 13.0 - 44.0 %    Monocytes % 7.8 2.0 - 10.0 %    Eosinophils % 4.7 0.0 - 6.0 %    Basophils % 0.6 0.0 - 2.0 %    Neutrophils Absolute 3.60 1.60 - 5.50 x10*3/uL    Immature Granulocytes Absolute, Automated 0.02 0.00 - 0.50 x10*3/uL    Lymphocytes Absolute 1.92 0.80 - 3.00 x10*3/uL    Monocytes Absolute 0.50 0.05 - 0.80 x10*3/uL    Eosinophils Absolute 0.30 0.00 - 0.40 x10*3/uL    Basophils Absolute 0.04 0.00 - 0.10 x10*3/uL   Comprehensive metabolic panel   Result Value Ref Range    Glucose 94 74 - 99 mg/dL    Sodium 141 136 - 145 mmol/L    Potassium 4.3 3.5 - 5.3 mmol/L    Chloride 102 98 - 107 mmol/L    Bicarbonate 23 21 - 32 mmol/L    Anion Gap 20 10 - 20 mmol/L    Urea Nitrogen 30 (H) 6 - 23 mg/dL    Creatinine 3.02 (H) 0.50 - 1.05 mg/dL    eGFR 15 (L)  >60 mL/min/1.73m*2    Calcium 9.5 8.6 - 10.3 mg/dL    Albumin 4.3 3.4 - 5.0 g/dL    Alkaline Phosphatase 76 33 - 136 U/L    Total Protein 7.0 6.4 - 8.2 g/dL    AST 16 9 - 39 U/L    Bilirubin, Total 0.4 0.0 - 1.2 mg/dL    ALT 12 7 - 45 U/L   Magnesium   Result Value Ref Range    Magnesium 2.26 1.60 - 2.40 mg/dL   TSH with reflex to Free T4 if abnormal   Result Value Ref Range    Thyroid Stimulating Hormone 2.78 0.44 - 3.98 mIU/L   Type and Screen   Result Value Ref Range    ABO TYPE O     Rh TYPE POS     ANTIBODY SCREEN NEG    Protime-INR   Result Value Ref Range    Protime 12.0 9.8 - 12.8 seconds    INR 1.1 0.9 - 1.1   ECG 12 lead   Result Value Ref Range    Ventricular Rate 81 BPM    Atrial Rate 81 BPM    DC Interval 178 ms    QRS Duration 96 ms    QT Interval 396 ms    QTC Calculation(Bazett) 460 ms    P Axis 42 degrees    R Axis -6 degrees    T Axis 26 degrees    QRS Count 13 beats    Q Onset 213 ms    P Onset 124 ms    P Offset 179 ms    T Offset 411 ms    QTC Fredericia 437 ms   Sars-CoV-2 and Influenza A/B PCR   Result Value Ref Range    Flu A Result Not Detected Not Detected    Flu B Result Not Detected Not Detected    Coronavirus 2019, PCR Not Detected Not Detected   Urinalysis with Reflex Culture and Microscopic   Result Value Ref Range    Color, Urine Yellow Straw, Yellow    Appearance, Urine Clear Clear    Specific Gravity, Urine 1.006 1.005 - 1.035    pH, Urine 6.0 5.0, 5.5, 6.0, 6.5, 7.0, 7.5, 8.0    Protein, Urine NEGATIVE NEGATIVE mg/dL    Glucose, Urine NEGATIVE NEGATIVE mg/dL    Blood, Urine NEGATIVE NEGATIVE    Ketones, Urine NEGATIVE NEGATIVE mg/dL    Bilirubin, Urine NEGATIVE NEGATIVE    Urobilinogen, Urine <2.0 <2.0 mg/dL    Nitrite, Urine NEGATIVE NEGATIVE    Leukocyte Esterase, Urine TRACE (A) NEGATIVE   Extra Urine Gray Tube   Result Value Ref Range    Extra Tube Hold for add-ons.    Microscopic Only, Urine   Result Value Ref Range    WBC, Urine 1-5 1-5, NONE /HPF    RBC, Urine 1-2 NONE, 1-2,  3-5 /HPF    Mucus, Urine FEW Reference range not established. /LPF   CBC   Result Value Ref Range    WBC 5.2 4.4 - 11.3 x10*3/uL    nRBC 0.0 0.0 - 0.0 /100 WBCs    RBC 3.75 (L) 4.00 - 5.20 x10*6/uL    Hemoglobin 10.9 (L) 12.0 - 16.0 g/dL    Hematocrit 34.6 (L) 36.0 - 46.0 %    MCV 92 80 - 100 fL    MCH 29.1 26.0 - 34.0 pg    MCHC 31.5 (L) 32.0 - 36.0 g/dL    RDW 12.9 11.5 - 14.5 %    Platelets 358 150 - 450 x10*3/uL   Basic metabolic panel   Result Value Ref Range    Glucose 99 74 - 99 mg/dL    Sodium 141 136 - 145 mmol/L    Potassium 4.2 3.5 - 5.3 mmol/L    Chloride 106 98 - 107 mmol/L    Bicarbonate 25 21 - 32 mmol/L    Anion Gap 14 10 - 20 mmol/L    Urea Nitrogen 27 (H) 6 - 23 mg/dL    Creatinine 2.73 (H) 0.50 - 1.05 mg/dL    eGFR 17 (L) >60 mL/min/1.73m*2    Calcium 9.3 8.6 - 10.3 mg/dL       ECG 12 lead    Result Date: 1/9/2024  Normal sinus rhythm Incomplete right bundle branch block Cannot rule out Anterior infarct , age undetermined Abnormal ECG When compared with ECG of 11-DEC-2023 13:59, No significant change was found    CT head wo IV contrast    Result Date: 1/9/2024  Interpreted By:  Charlotte García, STUDY: CT HEAD WO IV CONTRAST;  1/8/2024 11:11 pm   INDICATION: Signs/Symptoms:Dizziness.   COMPARISON: 08/27/2014   ACCESSION NUMBER(S): [id number]   ORDERING CLINICIAN: HARRISON ENGEL   TECHNIQUE: Axial noncontrast CT images of the head.   FINDINGS: BRAIN PARENCHYMA: Mild-to-moderate non-specific white matter changes. Atherosclerotic calcifications within the carotid arteries. Partially empty sella turcica noted. No mass effect or midline shift. Probable small calcification within the right cerebellar hemisphere.   HEMORRHAGE: No acute intracranial hemorrhage. VENTRICLES and EXTRA-AXIAL SPACES: The ventricles, sulci and basal cisterns enlarged, concordant with parenchymal volume loss. EXTRACRANIAL SOFT TISSUES: Within normal limits. PARANASAL SINUSES/MASTOIDS: The visualized paranasal sinuses and  mastoid air cells are aerated. CALVARIUM: No depressed skull fracture. No destructive osseous lesion.   OTHER FINDINGS: None.       No acute intracranial hemorrhage or mass effect.   Nonspecific white matter changes and parenchymal volume loss     MACRO: None.   Signed by: Charlotte García 1/9/2024 1:16 AM Dictation workstation:   [id number]    CT abdomen pelvis wo IV contrast    Result Date: 1/8/2024  STUDY: CT Abdomen and Pelvis without IV Contrast; 1/8/2024 at 7:10 p.m. INDICATION: Abdominal pain.  Additional History:  Diverticulitis with abscess. COMPARISON: CT AP 12/16/2023.  US gallbladder 8/18/2021. ACCESSION NUMBER(S): [id number] ORDERING CLINICIAN: JESSICA LEVIN TECHNIQUE: CT of the abdomen and pelvis was performed.  Contiguous axial images were obtained at 3 mm slice thickness through the abdomen and pelvis. Coronal and sagittal reconstructions at 3 mm slice thickness were performed. No intravenous contrast was administered.  Automated mA/kV exposure control was utilized and patient examination was performed in strict accordance with principles of ALARA. FINDINGS: Please note that the evaluation of vessels, lymph nodes and organs is limited without intravenous contrast.  Abdomen: The previously seen acute diverticulitis of the mid sigmoid colon, associated with a minimal peridiverticular abscess, has essentially resolved. There is only trace residual stranding in the region of previous inflammation. No abscess is currently seen. Underlying marked diverticulosis is again seen in the sigmoid colon and descending colon. There is no obstruction or free air. Liver, spleen, pancreas, adrenal glands, and biliary system are unremarkable. Kidneys are normal size with minimal perinephric stranding. There are no renal calculi and there is no hydronephrosis. There is no localizing lymphadenopathy or ascites. Abdominal aorta is normal caliber. There is no abdominal wall hernia. Pelvis: The bladder is normally  distended. There is no bladder wall thickening or air in the bladder lumen. There are no deep pelvic fluid collections. There are no inguinal hernias. Skeletal: There is minimal arthritis in the spine especially at L2-3. Alignment is anatomic. Lower chest: There is mild emphysema in the lung bases. There are no pleural effusions. The heart is top normal size with dense coronary artery calcifications.    1. Resolved acute diverticulitis. 2. No abscess or other complication. 3. Remainder as above. Signed by Bob Ornelas MD            Assessment/Plan   Principal Problem:    Acute kidney injury (CMS/HCC)    SENIA  - IVF  - nephro following    HTN emergency  BP has improved  - nephro following  - renal artery US  - norvasc  - metoprolol    Normocytic anemia  - monitor    Epistaxis  resolved  - can restart plavix/ASA    HLD  - statin/ASA/zetia/plavix    Dispo: monitor clinically, continue IVF and work up for SENIA         Marilyn Adames MD  Hospitalist

## 2024-01-09 NOTE — H&P
History Of Present Illness  Shanika Diaz is a 83 y.o. female with a past medical history of hypertension, hyperlipidemia, CAD, anxiety, GERD, IBS, diverticular disease status post recent diverticulitis with abscess in the middle of December 2023.  Patient was admitted to the hospital on December 16 and discharge on December 19.  She states that upon discharge she was on clear liquid diet.  She was having diarrhea until about 4 days ago.  She has been having dizziness at home.  She also has been having nosebleed.  She states that today was the 6 time she had nosebleed.  She was also having chills but denies fever.  She reported no change in vision, paresthesia, focal weakness but states that she had generalized weakness.  She reported no slurring of speech or change in vision.  She is hard of hearing chronically.  She denies chest pain, palpitation, difficulty breathing, abdominal pain, nausea, vomiting, melena, hematochezia, hematuria, leg pain or leg swelling.     She was seen in Dr. Huerta's office for routine follow-up today.  Then she was sent to the emergency room.  In the emergency room she was found to have acute kidney injury and hypertensive crisis.    Past Medical History  She has a past medical history of Atherosclerotic heart disease of native coronary artery without angina pectoris, Hypertension, Personal history of other diseases of the respiratory system (02/02/2019), and Personal history of other specified conditions.    Surgical History  She has a past surgical history that includes Tonsillectomy (09/15/2014); Other surgical history (09/15/2014); Cataract extraction (09/15/2014); Other surgical history (12/12/2016); Cardiac surgery; and Coronary angioplasty with stent (10/04/2016).     Social History  She reports that she has never smoked. She has never used smokeless tobacco. She reports current alcohol use of about 14.0 standard drinks of alcohol per week. She reports that she does not use  drugs.    Family History  Family History   Problem Relation Name Age of Onset    Arthritis Mother      Hyperlipidemia Mother      Cerebral aneurysm Father      Atrial fibrillation Brother          Chronic    Other (Cardiac disorder) Brother      Hypertension Brother      Lymphoma Brother          Allergies  Sulfa (sulfonamide antibiotics), Codeine, and Penicillin    Medications  Scheduled medications    Continuous medications    PRN medications      Review of systems: 10-point review of systems is negative.     Physical Exam  Constitutional: alert and oriented x 3, awake, cooperative, no acute distress  Skin: warm and dry  Head/Neck: Normocephalic, atraumatic  Eyes: clear sclera  ENMT: mucous membranes moist  Cardio: Regular rate and rhythm  Resp: CTA bilaterally, good respiratory effort  Gastrointestinal: Soft, nontender, nondistended, bowel sounds present  Musculoskeletal: ROM intact, no joint swelling  Extremities: No edema, cyanosis, or clubbing  Neuro: lert and oriented x 3, sensation is intact.  Patient moves all limbs against resistance.  Psychological: Appropriate mood and behavior     Last Recorded Vitals  BP (!) 203/92 (BP Location: Right arm, Patient Position: Sitting)   Pulse 81   Temp 36.1 °C (97 °F) (Temporal)   Resp 20   Wt 79.4 kg (175 lb)   SpO2 97%     Relevant Results  Results for orders placed or performed during the hospital encounter of 01/08/24 (from the past 24 hour(s))   CBC and Auto Differential   Result Value Ref Range    WBC 6.4 4.4 - 11.3 x10*3/uL    nRBC 0.0 0.0 - 0.0 /100 WBCs    RBC 4.29 4.00 - 5.20 x10*6/uL    Hemoglobin 12.5 12.0 - 16.0 g/dL    Hematocrit 39.3 36.0 - 46.0 %    MCV 92 80 - 100 fL    MCH 29.1 26.0 - 34.0 pg    MCHC 31.8 (L) 32.0 - 36.0 g/dL    RDW 12.7 11.5 - 14.5 %    Platelets 464 (H) 150 - 450 x10*3/uL    Neutrophils % 56.5 40.0 - 80.0 %    Immature Granulocytes %, Automated 0.3 0.0 - 0.9 %    Lymphocytes % 30.1 13.0 - 44.0 %    Monocytes % 7.8 2.0 - 10.0 %     Eosinophils % 4.7 0.0 - 6.0 %    Basophils % 0.6 0.0 - 2.0 %    Neutrophils Absolute 3.60 1.60 - 5.50 x10*3/uL    Immature Granulocytes Absolute, Automated 0.02 0.00 - 0.50 x10*3/uL    Lymphocytes Absolute 1.92 0.80 - 3.00 x10*3/uL    Monocytes Absolute 0.50 0.05 - 0.80 x10*3/uL    Eosinophils Absolute 0.30 0.00 - 0.40 x10*3/uL    Basophils Absolute 0.04 0.00 - 0.10 x10*3/uL   Comprehensive metabolic panel   Result Value Ref Range    Glucose 94 74 - 99 mg/dL    Sodium 141 136 - 145 mmol/L    Potassium 4.3 3.5 - 5.3 mmol/L    Chloride 102 98 - 107 mmol/L    Bicarbonate 23 21 - 32 mmol/L    Anion Gap 20 10 - 20 mmol/L    Urea Nitrogen 30 (H) 6 - 23 mg/dL    Creatinine 3.02 (H) 0.50 - 1.05 mg/dL    eGFR 15 (L) >60 mL/min/1.73m*2    Calcium 9.5 8.6 - 10.3 mg/dL    Albumin 4.3 3.4 - 5.0 g/dL    Alkaline Phosphatase 76 33 - 136 U/L    Total Protein 7.0 6.4 - 8.2 g/dL    AST 16 9 - 39 U/L    Bilirubin, Total 0.4 0.0 - 1.2 mg/dL    ALT 12 7 - 45 U/L   Magnesium   Result Value Ref Range    Magnesium 2.26 1.60 - 2.40 mg/dL   TSH with reflex to Free T4 if abnormal   Result Value Ref Range    Thyroid Stimulating Hormone 2.78 0.44 - 3.98 mIU/L   Type and Screen   Result Value Ref Range    ABO TYPE O     Rh TYPE POS     ANTIBODY SCREEN NEG    Protime-INR   Result Value Ref Range    Protime 12.0 9.8 - 12.8 seconds    INR 1.1 0.9 - 1.1   Sars-CoV-2 and Influenza A/B PCR   Result Value Ref Range    Flu A Result Not Detected Not Detected    Flu B Result Not Detected Not Detected    Coronavirus 2019, PCR Not Detected Not Detected   Urinalysis with Reflex Culture and Microscopic   Result Value Ref Range    Color, Urine Yellow Straw, Yellow    Appearance, Urine Clear Clear    Specific Gravity, Urine 1.006 1.005 - 1.035    pH, Urine 6.0 5.0, 5.5, 6.0, 6.5, 7.0, 7.5, 8.0    Protein, Urine NEGATIVE NEGATIVE mg/dL    Glucose, Urine NEGATIVE NEGATIVE mg/dL    Blood, Urine NEGATIVE NEGATIVE    Ketones, Urine NEGATIVE NEGATIVE mg/dL     Bilirubin, Urine NEGATIVE NEGATIVE    Urobilinogen, Urine <2.0 <2.0 mg/dL    Nitrite, Urine NEGATIVE NEGATIVE    Leukocyte Esterase, Urine TRACE (A) NEGATIVE   Microscopic Only, Urine   Result Value Ref Range    WBC, Urine 1-5 1-5, NONE /HPF    RBC, Urine 1-2 NONE, 1-2, 3-5 /HPF    Mucus, Urine FEW Reference range not established. /LPF     CT abdomen pelvis wo IV contrast    Result Date: 1/8/2024  STUDY: CT Abdomen and Pelvis without IV Contrast; 1/8/2024 at 7:10 p.m. INDICATION: Abdominal pain.  Additional History:  Diverticulitis with abscess. COMPARISON: CT AP 12/16/2023.  US gallbladder 8/18/2021. ACCESSION NUMBER(S): [id number] ORDERING CLINICIAN: JESSICA LEVIN TECHNIQUE: CT of the abdomen and pelvis was performed.  Contiguous axial images were obtained at 3 mm slice thickness through the abdomen and pelvis. Coronal and sagittal reconstructions at 3 mm slice thickness were performed. No intravenous contrast was administered.  Automated mA/kV exposure control was utilized and patient examination was performed in strict accordance with principles of ALARA. FINDINGS: Please note that the evaluation of vessels, lymph nodes and organs is limited without intravenous contrast.  Abdomen: The previously seen acute diverticulitis of the mid sigmoid colon, associated with a minimal peridiverticular abscess, has essentially resolved. There is only trace residual stranding in the region of previous inflammation. No abscess is currently seen. Underlying marked diverticulosis is again seen in the sigmoid colon and descending colon. There is no obstruction or free air. Liver, spleen, pancreas, adrenal glands, and biliary system are unremarkable. Kidneys are normal size with minimal perinephric stranding. There are no renal calculi and there is no hydronephrosis. There is no localizing lymphadenopathy or ascites. Abdominal aorta is normal caliber. There is no abdominal wall hernia. Pelvis: The bladder is normally distended.  There is no bladder wall thickening or air in the bladder lumen. There are no deep pelvic fluid collections. There are no inguinal hernias. Skeletal: There is minimal arthritis in the spine especially at L2-3. Alignment is anatomic. Lower chest: There is mild emphysema in the lung bases. There are no pleural effusions. The heart is top normal size with dense coronary artery calcifications.    1. Resolved acute diverticulitis. 2. No abscess or other complication. 3. Remainder as above. Signed by Bob Ornelas MD    ECG 12 lead (Clinic Performed)    Result Date: 12/18/2023  Normal sinus rhythm Incomplete right bundle branch block Borderline ECG When compared with ECG of 20-MAR-2023 08:38, No significant change was found Confirmed by Obed Cortez ([id number]) on 12/18/2023 2:42:07 PM    CT abdomen pelvis w IV contrast    Result Date: 12/16/2023  Interpreted By:  Britni Saldana, STUDY: CT ABDOMEN PELVIS W IV CONTRAST; ;  12/16/2023 4:05 pm   INDICATION: Signs/Symptoms:Abdomen pain.   COMPARISON: 05/05/2017   ACCESSION NUMBER(S): [id number]   ORDERING CLINICIAN: BENTLEY YOUNG   TECHNIQUE: Imaging was performed from dome of the diaphragm through ischial tuberosities with axial, coronal and sagittal images reconstructed. Patient received 80 mL Omnipaque 350 intravenously   FINDINGS: Lung bases are clear. No pleural effusions are noted. The visualized heart appears normal in size.   No mass is noted in the liver. There is no intra or extrahepatic biliary dilatation. Gallbladder is normal in appearance.   No mass or inflammation is noted involving the pancreas.   Spleen is normal in size with no focal mass noted.   Adrenal glands appear normal. Kidneys enhance symmetrically with no hydronephrosis noted.   Small hiatal hernia is noted. Bowel loops are nondilated. Appendix appears normal.   Numerous diverticular present in the sigmoid colon. There is bowel wall thickening in the mid sigmoid colon with adjacent  mesenteric inflammation compatible with diverticulitis. No free air or free fluid is noted. There does however appear to be about 1.5 cm abscess directly adjacent to 1 of the diverticula in the area of inflammation..   Aorta and vena cava are normal in size. There are no pathologically enlarged retroperitoneal, iliac or inguinal lymph nodes identified.   Urinary bladder is normal in appearance with no wall thickening.   Uterus is normal in size. No adnexal mass is identified.   No abdominal wall hernia is identified.   Degenerative changes are noted at multiple levels in the spine. Minimal grade 1 anterolisthesis of L4 on L5 is associated with facet arthropathy but no spondylolysis.       Diverticulitis of mid sigmoid colon with 1.5 cm peridiverticular abscess     MACRO: None.   Signed by: Britni Saldana 12/16/2023 4:31 PM Dictation workstation:   [id number]       Assessment/Plan   Principal Problem:    Acute kidney injury (CMS/HCC)  Hypertensive crisis  Epistaxis  Dizziness/lightheadedness  Generalized weakness    Shanika Valle is an 83-year-old female status post recent hospital discharge for diverticulitis with abscess who was readmitted for acute kidney injury and hypertensive crisis.  The acute kidney injury may be multifactorial.  Patient has hypertensive crisis, she was having diarrhea the whole time she was home until 4 days ago, she reported poor oral intake in addition to recent hospitalization where she got antibiotics and IV contrast for the diverticulitis with diverticular abscess.  CT scan was reviewed.  No evidence for infection at this time.  Epistaxis may be secondary to elevated blood pressure.  Currently there is no evidence for nosebleed    Other comorbidities include  Hyperlipidemia, CAD, anxiety, GERD, IBS    -Admit to telemetry  -Monitor blood pressure  -Increase Norvasc to 10 mg  -Check orthostatic blood pressure and pulse  -Restart metoprolol  -Give hydralazine as needed  -Continue  IV fluids  -Avoid nephrotoxins  --Hold ranolazine in view of SENIA  -Fall precautions  -We will avoid NSAIDs and blood thinners at this time due to epistaxis  -Will restart Plavix tomorrow (Tuesday, Thursday, Saturday) since epistaxis is resolved.    -Will monitor  -We will resume home meds for all comorbidities    DVT prophylaxis  -SCDs for now       Tiki Morelos MD

## 2024-01-09 NOTE — NURSING NOTE
2155: pt arrived to [address]    2310: let Dr. Morelos know I need med rec    1-9-24/048: pt having a nose bleed again, states she has been having one intermittently all week. Let Dr. Morelos know, no further orders    1-9-24/0105: pt is sitting upright pinching nose, gave pt icepack to place on side of nose to try to stop bleeding

## 2024-01-10 ENCOUNTER — APPOINTMENT (OUTPATIENT)
Dept: VASCULAR MEDICINE | Facility: HOSPITAL | Age: 84
DRG: 683 | End: 2024-01-10
Payer: MEDICARE

## 2024-01-10 VITALS
DIASTOLIC BLOOD PRESSURE: 77 MMHG | BODY MASS INDEX: 30.7 KG/M2 | WEIGHT: 179.8 LBS | TEMPERATURE: 97.5 F | RESPIRATION RATE: 16 BRPM | OXYGEN SATURATION: 94 % | SYSTOLIC BLOOD PRESSURE: 130 MMHG | HEIGHT: 64 IN | HEART RATE: 81 BPM

## 2024-01-10 LAB
ALBUMIN SERPL BCP-MCNC: 3.8 G/DL (ref 3.4–5)
ALP SERPL-CCNC: 65 U/L (ref 33–136)
ALT SERPL W P-5'-P-CCNC: 10 U/L (ref 7–45)
ANION GAP SERPL CALC-SCNC: 13 MMOL/L (ref 10–20)
AST SERPL W P-5'-P-CCNC: 10 U/L (ref 9–39)
ATRIAL RATE: 81 BPM
BASOPHILS # BLD AUTO: 0.04 X10*3/UL (ref 0–0.1)
BASOPHILS NFR BLD AUTO: 0.7 %
BILIRUB SERPL-MCNC: 0.5 MG/DL (ref 0–1.2)
BUN SERPL-MCNC: 30 MG/DL (ref 6–23)
CALCIUM SERPL-MCNC: 9.4 MG/DL (ref 8.6–10.3)
CHLORIDE SERPL-SCNC: 108 MMOL/L (ref 98–107)
CO2 SERPL-SCNC: 26 MMOL/L (ref 21–32)
CREAT SERPL-MCNC: 2.73 MG/DL (ref 0.5–1.05)
EGFRCR SERPLBLD CKD-EPI 2021: 17 ML/MIN/1.73M*2
EOSINOPHIL # BLD AUTO: 0.33 X10*3/UL (ref 0–0.4)
EOSINOPHIL NFR BLD AUTO: 5.8 %
ERYTHROCYTE [DISTWIDTH] IN BLOOD BY AUTOMATED COUNT: 12.7 % (ref 11.5–14.5)
GLUCOSE SERPL-MCNC: 100 MG/DL (ref 74–99)
HCT VFR BLD AUTO: 35.4 % (ref 36–46)
HGB BLD-MCNC: 11.2 G/DL (ref 12–16)
IMM GRANULOCYTES # BLD AUTO: 0.01 X10*3/UL (ref 0–0.5)
IMM GRANULOCYTES NFR BLD AUTO: 0.2 % (ref 0–0.9)
LYMPHOCYTES # BLD AUTO: 1.49 X10*3/UL (ref 0.8–3)
LYMPHOCYTES NFR BLD AUTO: 26.1 %
MCH RBC QN AUTO: 28.8 PG (ref 26–34)
MCHC RBC AUTO-ENTMCNC: 31.6 G/DL (ref 32–36)
MCV RBC AUTO: 91 FL (ref 80–100)
MONOCYTES # BLD AUTO: 0.42 X10*3/UL (ref 0.05–0.8)
MONOCYTES NFR BLD AUTO: 7.4 %
NEUTROPHILS # BLD AUTO: 3.42 X10*3/UL (ref 1.6–5.5)
NEUTROPHILS NFR BLD AUTO: 59.8 %
NRBC BLD-RTO: 0 /100 WBCS (ref 0–0)
P AXIS: 42 DEGREES
P OFFSET: 179 MS
P ONSET: 124 MS
PLATELET # BLD AUTO: 378 X10*3/UL (ref 150–450)
POTASSIUM SERPL-SCNC: 4 MMOL/L (ref 3.5–5.3)
PR INTERVAL: 178 MS
PROT SERPL-MCNC: 6.4 G/DL (ref 6.4–8.2)
Q ONSET: 213 MS
QRS COUNT: 13 BEATS
QRS DURATION: 96 MS
QT INTERVAL: 396 MS
QTC CALCULATION(BAZETT): 460 MS
QTC FREDERICIA: 437 MS
R AXIS: -6 DEGREES
RBC # BLD AUTO: 3.89 X10*6/UL (ref 4–5.2)
SODIUM SERPL-SCNC: 143 MMOL/L (ref 136–145)
T AXIS: 26 DEGREES
T OFFSET: 411 MS
VENTRICULAR RATE: 81 BPM
WBC # BLD AUTO: 5.7 X10*3/UL (ref 4.4–11.3)

## 2024-01-10 PROCEDURE — 93975 VASCULAR STUDY: CPT | Performed by: SURGERY

## 2024-01-10 PROCEDURE — 2500000001 HC RX 250 WO HCPCS SELF ADMINISTERED DRUGS (ALT 637 FOR MEDICARE OP): Performed by: INTERNAL MEDICINE

## 2024-01-10 PROCEDURE — 2500000001 HC RX 250 WO HCPCS SELF ADMINISTERED DRUGS (ALT 637 FOR MEDICARE OP): Performed by: NURSE PRACTITIONER

## 2024-01-10 PROCEDURE — 85025 COMPLETE CBC W/AUTO DIFF WBC: CPT | Performed by: STUDENT IN AN ORGANIZED HEALTH CARE EDUCATION/TRAINING PROGRAM

## 2024-01-10 PROCEDURE — 2500000005 HC RX 250 GENERAL PHARMACY W/O HCPCS: Performed by: INTERNAL MEDICINE

## 2024-01-10 PROCEDURE — 94760 N-INVAS EAR/PLS OXIMETRY 1: CPT

## 2024-01-10 PROCEDURE — 99239 HOSP IP/OBS DSCHRG MGMT >30: CPT | Performed by: STUDENT IN AN ORGANIZED HEALTH CARE EDUCATION/TRAINING PROGRAM

## 2024-01-10 PROCEDURE — 99233 SBSQ HOSP IP/OBS HIGH 50: CPT | Performed by: INTERNAL MEDICINE

## 2024-01-10 PROCEDURE — 80053 COMPREHEN METABOLIC PANEL: CPT | Performed by: STUDENT IN AN ORGANIZED HEALTH CARE EDUCATION/TRAINING PROGRAM

## 2024-01-10 PROCEDURE — 93975 VASCULAR STUDY: CPT

## 2024-01-10 PROCEDURE — 2500000004 HC RX 250 GENERAL PHARMACY W/ HCPCS (ALT 636 FOR OP/ED): Performed by: INTERNAL MEDICINE

## 2024-01-10 RX ORDER — OXYMETAZOLINE HCL 0.05 %
2 SPRAY, NON-AEROSOL (ML) NASAL EVERY 12 HOURS PRN
Qty: 30 ML | Refills: 0 | Status: SHIPPED | OUTPATIENT
Start: 2024-01-10

## 2024-01-10 RX ORDER — AMLODIPINE BESYLATE 10 MG/1
10 TABLET ORAL DAILY
Qty: 30 TABLET | Refills: 0 | Status: SHIPPED | OUTPATIENT
Start: 2024-01-11 | End: 2024-02-08 | Stop reason: SDUPTHER

## 2024-01-10 RX ORDER — OXYMETAZOLINE HCL 0.05 %
2 SPRAY, NON-AEROSOL (ML) NASAL EVERY 12 HOURS PRN
Status: DISCONTINUED | OUTPATIENT
Start: 2024-01-10 | End: 2024-01-10 | Stop reason: HOSPADM

## 2024-01-10 RX ADMIN — ONDANSETRON HYDROCHLORIDE 4 MG: 4 TABLET, FILM COATED ORAL at 09:13

## 2024-01-10 RX ADMIN — POLYETHYLENE GLYCOL 3350 17 G: 17 POWDER, FOR SOLUTION ORAL at 09:15

## 2024-01-10 RX ADMIN — OXYMETAZOLINE HYDROCHLORIDE 2 SPRAY: 0.05 SPRAY NASAL at 09:11

## 2024-01-10 RX ADMIN — ROSUVASTATIN CALCIUM 5 MG: 10 TABLET, FILM COATED ORAL at 09:13

## 2024-01-10 RX ADMIN — Medication 1000 UNITS: at 09:13

## 2024-01-10 RX ADMIN — AMLODIPINE BESYLATE 10 MG: 10 TABLET ORAL at 09:13

## 2024-01-10 RX ADMIN — METOPROLOL SUCCINATE 25 MG: 25 TABLET, EXTENDED RELEASE ORAL at 09:13

## 2024-01-10 ASSESSMENT — COGNITIVE AND FUNCTIONAL STATUS - GENERAL
MOBILITY SCORE: 24
DAILY ACTIVITIY SCORE: 24

## 2024-01-10 ASSESSMENT — PAIN SCALES - GENERAL: PAINLEVEL_OUTOF10: 0 - NO PAIN

## 2024-01-10 NOTE — PROGRESS NOTES
01/10/24 1036   Discharge Planning   Living Arrangements Spouse/significant other   Support Systems Spouse/significant other   Assistance Needed patient is alert and oriented x3, independent in ADL's, uses no assistive devices, no DME in the home, patient drives and is on room air at baseline   Type of Residence Private residence   Number of Stairs to Enter Residence 2   Number of Stairs Within Residence 0   Do you have animals or pets at home? No   Who is requesting discharge planning? Provider   Home or Post Acute Services None   Patient expects to be discharged to: home NN

## 2024-01-10 NOTE — NURSING NOTE
1930: assumed pt care    1-10-24/0600: pt has another bloody nose, applied ice and pressure to nose, let doris zaragoza know

## 2024-01-10 NOTE — PROGRESS NOTES
Shanika Diaz is a 83 y.o. female on day 2 of admission presenting with Acute kidney injury (CMS/HCC).      Subjective   No complaints today.  Kidney function is stable.  Good urine output.  Blood pressure is accepted    Objective          Vitals 24HR  Heart Rate:  [78-83]   Temp:  [36.3 °C (97.3 °F)-36.6 °C (97.9 °F)]   Resp:  [16]   BP: (110-151)/(51-79)   SpO2:  [93 %-94 %]       Intake/Output last 3 Shifts:    Intake/Output Summary (Last 24 hours) at 1/10/2024 1103  Last data filed at 1/10/2024 0600  Gross per 24 hour   Intake 500 ml   Output --   Net 500 ml       Physical Exam         General appearance: no distress awake and alert on room air, euvolemic on exam  Eyes: non-icteric  HEENT: atrumatic head, PEERLA, moist mucosa  Skin: no apparent rash  Heart: NSR, S1, S2 normal, no murmur or gallop  Lungs: Symmetrical expansion,CTA bilat no wheezing/crackles  Abdomen: soft, nt/nd, obese  Extremities: no edema bilat  Neuro: No FND,asterixis, no focal deficits noticed    RFP  Recent Labs     01/10/24  0656 01/09/24  0651 01/08/24  1736 12/19/23  0639 12/18/23  0549 12/17/23  0703 12/16/23  1518    141 141 139 138 137 135*   K 4.0 4.2 4.3 3.6 3.8 3.9 4.1   * 106 102 105 104 104 100   CO2 26 25 23 24 25 24 25   BUN 30* 27* 30* 7 9 12 15   CREATININE 2.73* 2.73* 3.02* 0.95 1.01 0.95 1.03   GLUCOSE 100* 99 94 102* 98 101* 83   CALCIUM 9.4 9.3 9.5 8.8 9.0 8.9 9.7   PHOS  --   --   --  3.9 4.2 3.5  --    EGFR 17* 17* 15* 60* 55* 60* 54*   ANIONGAP 13 14 20 14 13 13 14        Urineanalysis  Recent Labs     01/08/24  1808 12/16/23  1537 12/16/23  1220 08/07/23  1227   COLORU Yellow Yellow Orange* Yellow   APPEARANCEU Clear Hazy* Clear Clear   SPECGRAVU 1.006 1.014 >=1.030 1.020   THOMAS 6.0 5.0 5.5 6.5   PROTUR NEGATIVE NEGATIVE 30 (1+) NEGATIVE   GLUCOSEU NEGATIVE NEGATIVE NEGATIVE NEGATIVE   BLOODU NEGATIVE NEGATIVE NEGATIVE NEGATIVE   KETONESU NEGATIVE 20 (1+)* NEGATIVE NEGATIVE   BILIRUBINU NEGATIVE  NEGATIVE SMALL (1+)* NEGATIVE   NITRITEU NEGATIVE NEGATIVE NEGATIVE NEGATIVE   LEUKOCYTESU TRACE* LARGE (3+)*  --   --        Urine Electrolytes  Recent Labs     01/08/24  1808 12/16/23  1537 12/16/23  1220 08/07/23  1227   SODIUMURR 37  --   --   --    NACREATUR 70  --   --   --    KUR 33  --   --   --    KCREATUR 62  --   --   --    CREATU 53.1  --   --   --    PROTUR NEGATIVE NEGATIVE 30 (1+) NEGATIVE        Urine Micro  Recent Labs     01/08/24  1808 12/16/23  1537 11/22/17  1455   WBCU 1-5 >50* 2   RBCU 1-2 3-5 <1   HYALCASTU  --  1+*  --    SQUAMEPIU  --  1-9 (SPARSE) <1   BACTERIAU  --  1+* 1+*   MUCUSU FEW FEW 1+        Iron  Recent Labs     12/17/23  0703   IRON 35   TIBC 277   IRONSAT 13*   FERRITIN 168*       Vascular UsSRenal Artery Duplex Complete    Result Date: 1/10/2024  Preliminary Cardiology Report         [facility] [id number] [place] [address]  Tel [phone] and Fax [phone]    Preliminary Vascular Lab Report  VAS US RENAL ARTERY DUPLEX COMPLETE  Patient Name:      CASIMIRO Bird Physician: [id number] David FLORENCE Study Date:        1/10/2024        Ordering           [id number] COSTA LOZADA                                     Physician:         NIRAJ MRN/PID:           [id number]         Technologist:      Ofe Cuello [facility] Accession#:        [id number]     Technologist 2: Date of Birth/Age: 1940        Encounter#:        [id number] Gender:            F Admission Status:  Inpatient        Location           [facility]                                     Performed:  Diagnosis/ICD: Essential primary hypertension-I10 Procedure/CPT: 59843 Abdominal Visceral Renal  PRELIMINARY CONCLUSIONS: Right Renal Artery: Right renal arteries demonstrate no evidence of hemodynamically significant stenosis. The right renal veins are widely patent. Right renal arteries demonstrate no evidence of hemodynamically significant stenosis.  The right renal vein is widely patent. Left Renal Artery: Left renal arteries demonstrate no evidence of hemodynamically significant stenosis. The left renal veins are widely patent. Left renal arteries demonstrate no evidence of hemodynamically significant stenosis. The left renal vein is widely patent. Left renal artery only seen in segments. Cannot rule out stenosis in non visualized segments.  Additional Findings: Technically difficult exam due to patient respirations and positioning.  Imaging & Doppler Findings:  Renal Artery Duplex Right Kidney: 12.0 cm                           Left Kidney: 12.0 cm       Systolic        Diastolic     ARTERY            Systolic       Diastolic        66 cm/s         33 cm/s      Origin            92 cm/s         24 cm/s       110 cm/s         24 cm/s       Prox             67 cm/s         15 cm/s        98 cm/s         22 cm/s        Mid             101 cm/s        22 cm/s       100 cm/s         26 cm/s      Distal            100 cm/s        27 cm/s        29 cm/s         8 cm/s      Superior           29 cm/s         0 cm/s        28 cm/s         9 cm/s      Inferior           36 cm/s         11 cm/s                         Right                           Left                          1.2       R/A Ratio            1.1                          0.7    Resistive Index         0.7  Ao Dist Diam 1.20 cm Mid Ao       89 cm/s   VASCULAR PRELIMINARY REPORT completed by Ofe Cuello RVS on 1/10/2024 at 8:02:53 AM  ** Final **     ECG 12 lead    Result Date: 1/9/2024  Normal sinus rhythm Incomplete right bundle branch block Cannot rule out Anterior infarct , age undetermined Abnormal ECG When compared with ECG of 11-DEC-2023 13:59, No significant change was found    CT head wo IV contrast    Result Date: 1/9/2024  Interpreted By:  Charlotte García, STUDY: CT HEAD WO IV CONTRAST;  1/8/2024 11:11 pm   INDICATION: Signs/Symptoms:Dizziness.   COMPARISON: 08/27/2014   ACCESSION NUMBER(S):  [id number]   ORDERING CLINICIAN: HARRISON ENGEL   TECHNIQUE: Axial noncontrast CT images of the head.   FINDINGS: BRAIN PARENCHYMA: Mild-to-moderate non-specific white matter changes. Atherosclerotic calcifications within the carotid arteries. Partially empty sella turcica noted. No mass effect or midline shift. Probable small calcification within the right cerebellar hemisphere.   HEMORRHAGE: No acute intracranial hemorrhage. VENTRICLES and EXTRA-AXIAL SPACES: The ventricles, sulci and basal cisterns enlarged, concordant with parenchymal volume loss. EXTRACRANIAL SOFT TISSUES: Within normal limits. PARANASAL SINUSES/MASTOIDS: The visualized paranasal sinuses and mastoid air cells are aerated. CALVARIUM: No depressed skull fracture. No destructive osseous lesion.   OTHER FINDINGS: None.       No acute intracranial hemorrhage or mass effect.   Nonspecific white matter changes and parenchymal volume loss     MACRO: None.   Signed by: Charlotte García 1/9/2024 1:16 AM Dictation workstation:   [id number]    CT abdomen pelvis wo IV contrast    Result Date: 1/8/2024  STUDY: CT Abdomen and Pelvis without IV Contrast; 1/8/2024 at 7:10 p.m. INDICATION: Abdominal pain.  Additional History:  Diverticulitis with abscess. COMPARISON: CT AP 12/16/2023.  US gallbladder 8/18/2021. ACCESSION NUMBER(S): [id number] ORDERING CLINICIAN: JESSICA LEVIN TECHNIQUE: CT of the abdomen and pelvis was performed.  Contiguous axial images were obtained at 3 mm slice thickness through the abdomen and pelvis. Coronal and sagittal reconstructions at 3 mm slice thickness were performed. No intravenous contrast was administered.  Automated mA/kV exposure control was utilized and patient examination was performed in strict accordance with principles of ALARA. FINDINGS: Please note that the evaluation of vessels, lymph nodes and organs is limited without intravenous contrast.  Abdomen: The previously seen acute diverticulitis of the mid sigmoid  colon, associated with a minimal peridiverticular abscess, has essentially resolved. There is only trace residual stranding in the region of previous inflammation. No abscess is currently seen. Underlying marked diverticulosis is again seen in the sigmoid colon and descending colon. There is no obstruction or free air. Liver, spleen, pancreas, adrenal glands, and biliary system are unremarkable. Kidneys are normal size with minimal perinephric stranding. There are no renal calculi and there is no hydronephrosis. There is no localizing lymphadenopathy or ascites. Abdominal aorta is normal caliber. There is no abdominal wall hernia. Pelvis: The bladder is normally distended. There is no bladder wall thickening or air in the bladder lumen. There are no deep pelvic fluid collections. There are no inguinal hernias. Skeletal: There is minimal arthritis in the spine especially at L2-3. Alignment is anatomic. Lower chest: There is mild emphysema in the lung bases. There are no pleural effusions. The heart is top normal size with dense coronary artery calcifications.    1. Resolved acute diverticulitis. 2. No abscess or other complication. 3. Remainder as above. Signed by Bob Ornelas MD    ECG 12 lead (Clinic Performed)    Result Date: 12/18/2023  Normal sinus rhythm Incomplete right bundle branch block Borderline ECG When compared with ECG of 20-MAR-2023 08:38, No significant change was found Confirmed by Obed Cortez ([id number]) on 12/18/2023 2:42:07 PM    CT abdomen pelvis w IV contrast    Result Date: 12/16/2023  Interpreted By:  Britni Saldana, STUDY: CT ABDOMEN PELVIS W IV CONTRAST; ;  12/16/2023 4:05 pm   INDICATION: Signs/Symptoms:Abdomen pain.   COMPARISON: 05/05/2017   ACCESSION NUMBER(S): [id number]   ORDERING CLINICIAN: BENTLEY YOUNG   TECHNIQUE: Imaging was performed from dome of the diaphragm through ischial tuberosities with axial, coronal and sagittal images reconstructed. Patient received 80 mL  Omnipaque 350 intravenously   FINDINGS: Lung bases are clear. No pleural effusions are noted. The visualized heart appears normal in size.   No mass is noted in the liver. There is no intra or extrahepatic biliary dilatation. Gallbladder is normal in appearance.   No mass or inflammation is noted involving the pancreas.   Spleen is normal in size with no focal mass noted.   Adrenal glands appear normal. Kidneys enhance symmetrically with no hydronephrosis noted.   Small hiatal hernia is noted. Bowel loops are nondilated. Appendix appears normal.   Numerous diverticular present in the sigmoid colon. There is bowel wall thickening in the mid sigmoid colon with adjacent mesenteric inflammation compatible with diverticulitis. No free air or free fluid is noted. There does however appear to be about 1.5 cm abscess directly adjacent to 1 of the diverticula in the area of inflammation..   Aorta and vena cava are normal in size. There are no pathologically enlarged retroperitoneal, iliac or inguinal lymph nodes identified.   Urinary bladder is normal in appearance with no wall thickening.   Uterus is normal in size. No adnexal mass is identified.   No abdominal wall hernia is identified.   Degenerative changes are noted at multiple levels in the spine. Minimal grade 1 anterolisthesis of L4 on L5 is associated with facet arthropathy but no spondylolysis.       Diverticulitis of mid sigmoid colon with 1.5 cm peridiverticular abscess     MACRO: None.   Signed by: Britni Saldana 12/16/2023 4:31 PM Dictation workstation:   [id number]         amLODIPine, 10 mg, oral, Daily  [Held by provider] aspirin, 81 mg, oral, Once per day on Mon Wed Fri  cholecalciferol, 1,000 Units, oral, Daily  [Held by provider] clopidogrel, 75 mg, oral, Once per day on Tue Thu Sat  ezetimibe, 10 mg, oral, Nightly  latanoprost, 1 drop, Both Eyes, Nightly  metoprolol succinate XL, 25 mg, oral, Daily  ondansetron, 4 mg, oral, TID  polyethylene glycol, 17  g, oral, Daily  rosuvastatin, 5 mg, oral, Daily        Assessment/Plan        Principal Problem:    Acute kidney injury (CMS/HCC)        1-Nonoliguric acute kidney injury.  Within normal baseline serum creatinine.  Most likely hemodynamic injury in setting of hypertensive emergency with possible ATN.  Urinalysis bland with no blood or protein.  CAT scan without contrast was reviewed and showed within normal kidneys with no obstruction.  Kidney ultrasound Doppler with no significant stenosis elevated Limited exam.  Urine electrolytes are inconclusive     2-hypertensive urgency with epistaxis with blood pressure above 200/100.  Currently blood pressure is stable and in target.  Current medication amlodipine 10 mg, metoprolol XL 25 mg.  Not on RAAS inhibitors or diuretics.  Unknown cause of hypertensive urgency     Course: No significant improvement in kidney function.  I anticipate that recovery process might take few weeks     Recommendation plan    -Blood pressure is stable now.  Possibly dropped too rapidly however we will continue same medications  -Continue to avoid RAAS inhibitors/NSAIDs  -No need for IV fluid.  Patient is able to maintain good p.o. intake    No objection to discharge from kidney standpoint.  Will need to repeat renal function panel in a week to ensure down trending of creatinine and uptrending of kidney function.  Patient will need to see me in 2 months or earlier for follow-up     We will continue to follow           Leeanna Estrada MD

## 2024-01-10 NOTE — SIGNIFICANT EVENT
Nursing notified of epistaxis and ordered afrin to be used. Monitor for resolve. VS remain stable.

## 2024-01-10 NOTE — DISCHARGE SUMMARY
Discharge Diagnosis  Acute kidney injury (CMS/[facility])    Issues Requiring Follow-Up  Epistaxis and SENIA    Discharge Meds     Your medication list        START taking these medications        Instructions Last Dose Given Next Dose Due   oxymetazoline 0.05 % nasal spray  Commonly known as: Afrin      Administer 2 sprays into each nostril every 12 hours if needed for congestion. Do not use for more than 3 days.              CHANGE how you take these medications        Instructions Last Dose Given Next Dose Due   amLODIPine 10 mg tablet  Commonly known as: Norvasc  Start taking on: January 11, 2024  What changed:   medication strength  how much to take      Take 1 tablet (10 mg) by mouth once daily. Do not start before January 11, 2024.              CONTINUE taking these medications        Instructions Last Dose Given Next Dose Due   cholecalciferol 25 MCG (1000 UT) tablet  Commonly known as: Vitamin D-3           ezetimibe 10 mg tablet  Commonly known as: Zetia           latanoprost 0.005 % ophthalmic solution  Commonly known as: Xalatan           metoprolol succinate XL 25 mg 24 hr tablet  Commonly known as: Toprol-XL           nitroglycerin 0.4 mg SL tablet  Commonly known as: Nitrostat           ondansetron 4 mg tablet  Commonly known as: Zofran      Take 1 tablet (4 mg) by mouth 3 times a day.       polyethylene glycol 17 gram packet  Commonly known as: Glycolax, Miralax      Take 17 g by mouth once daily. Do not start before December 20, 2023.       rosuvastatin 5 mg tablet  Commonly known as: Crestor           Xeomin 50 Units recon soln  Generic drug: incobotulinumtoxinA                  STOP taking these medications      aspirin 81 mg EC tablet        clopidogrel 75 mg tablet  Commonly known as: Plavix        ranolazine 500 mg 12 hr tablet  Commonly known as: Ranexa                  Where to Get Your Medications        These medications were sent to [id number] - [place], [place] - [address] [place] [address] [place] [address]      Phone: [phone]   amLODIPine 10 mg tablet  oxymetazoline 0.05 % nasal spray         Test Results Pending At Discharge  Pending Labs       No current pending labs.            Hospital Course   Shanika Diaz is a 83 y.o. female with a past medical history of hypertension, hyperlipidemia, CAD, anxiety, GERD, IBS, diverticular disease status post recent diverticulitis with abscess in the middle of December 2023 presented with weakness and epistaxis. She is treated for    SENIA possible from HTN emergency and possible ATN  Cr stabilized  - IVF  - nephro following     HTN emergency  BP has improved  - nephro following  - norvasc  - metoprolol     Normocytic anemia  - monitor     Recurrent Epistaxis  Resolved  - afrin PRN  - hold plavix/ASA     HLD  - statin/zetia    Pt is hemodynamically stable for discharge at this time with close outpatient follow ups.     The patient was discharged in satisfactory condition.   Medications and side effect profile reviewed with patient.  More than 30 minutes were spent in coordinating patient discharge       Pertinent Physical Exam At Time of Discharge  Physical Exam  Vitals and nursing note reviewed.   Constitutional:       General: She is not in acute distress.     Appearance: Normal appearance. She is not ill-appearing or toxic-appearing.   HENT:      Head: Normocephalic and atraumatic.      Mouth/Throat:      Mouth: Mucous membranes are moist.   Eyes:      Extraocular Movements: Extraocular movements intact.      Conjunctiva/sclera: Conjunctivae normal.      Pupils: Pupils are equal, round, and reactive to light.   Cardiovascular:      Rate and Rhythm: Normal rate and regular rhythm.      Heart sounds: Murmur heard.      No gallop.   Pulmonary:      Effort: Pulmonary effort is normal. No respiratory distress.      Breath sounds: Normal breath sounds. No wheezing, rhonchi or rales.   Abdominal:      General: Abdomen is flat. Bowel sounds are  normal. There is no distension.      Palpations: Abdomen is soft. There is no mass.      Tenderness: There is no abdominal tenderness.   Musculoskeletal:         General: No swelling or tenderness. Normal range of motion.      Cervical back: Normal range of motion and neck supple.   Skin:     General: Skin is warm and dry.   Neurological:      General: No focal deficit present.      Mental Status: She is alert and oriented to person, place, and time. Mental status is at baseline.   Psychiatric:         Mood and Affect: Mood normal.         Behavior: Behavior normal.         Thought Content: Thought content normal.         Judgment: Judgment normal.     Outpatient Follow-Up  Future Appointments   Date Time Provider Department Center   1/15/2024 10:20 AM Zulma Ocampo APRN-CNP [id number] [facility]   3/25/2024  1:00 PM EUC ECHO/STRESS [id number] [facility]   3/25/2024  2:00 PM Johnathan Espinosa MD [id number] [facility]   4/5/2024 11:30 AM Rodríguez Beth MD [id number] [facility]         Marilyn Adames MD  Hospitalist

## 2024-01-10 NOTE — TELEPHONE ENCOUNTER
Pts spouse called in requesting a call regarding the Pts frequent nose bleeds. States pt recently left the hospital with questions regarding medications but also with concerns that she is still having frequent an heavy nose bleeds. Please call

## 2024-01-11 ENCOUNTER — PATIENT OUTREACH (OUTPATIENT)
Dept: PRIMARY CARE | Facility: CLINIC | Age: 84
End: 2024-01-11
Payer: MEDICARE

## 2024-01-11 NOTE — TELEPHONE ENCOUNTER
Spoke with pt was at Dr. Torres office Mon. For follow up on diverticulitis when she had a horrible nose bleed and felt poorly Dr. Huerta sent her to the hospital and they found her kidneys are not functioning well she was admitted ran a lot of tests and discharged yesterday cont to have nosebleeds and they stopped her clopidogrel and ASA and gave her oxymetazoline 0.05% nasal spray to use 2 squirts into each nostril Q 12 hours for congestion not to be used for more than 3 days She had another bad nose bleed last night that lasted 1/2 hour before she could get it to stop started again this am but was minimal and got it to stop. Humidifier is working again and she has water near the radiators also. Suggested she RTC here for TCM, but she says she was told to follow up with cardiology Any suggestions re nosebleeds?

## 2024-01-11 NOTE — PROGRESS NOTES
Discharge Facility: [facility]   Discharge Diagnosis: Acute kidney injury (CMS/[facility])   Admission Date: 1-8-24  Discharge Date: 1-10-24     PCP Appointment Date: Pt. Declined at this time   Specialist Appointment Date: 1-15-24 [facility]   Hospital Encounter and Summary: Linked  Engagement  Call Start Time: 1440 (1/11/2024  2:43 PM)    Medications  Medications reviewed with patient/caregiver?: Yes (1/11/2024  2:43 PM)  Is the patient having any side effects they believe may be caused by any medication additions or changes?: No (1/11/2024  2:43 PM)  Does the patient have all medications ordered at discharge?: Yes (1/11/2024  2:43 PM)  Care Management Interventions: No intervention needed (1/11/2024  2:43 PM)  Is the patient taking all medications as directed (includes completed medication regime)?: Yes (1/11/2024  2:43 PM)  Care Management Interventions: Provided patient education (1/11/2024  2:43 PM)    Appointments  Does the patient have a primary care provider?: Yes (1/11/2024  2:43 PM)  Care Management Interventions: Advised patient to make appointment (1/11/2024  2:43 PM)    Self Management  Has home health visited the patient within 72 hours of discharge?: Not applicable (1/11/2024  2:43 PM)    Patient Teaching  Does the patient have access to their discharge instructions?: Yes (1/11/2024  2:43 PM)  Care Management Interventions: Reviewed instructions with patient (1/11/2024  2:43 PM)  What is the patient's perception of their health status since discharge?: Improving (1/11/2024  2:43 PM)  Is the patient/caregiver able to teach back the hierarchy of who to call/visit for symptoms/problems? PCP, Specialist, Home Health nurse, Urgent Care, ED, 911: Yes (1/11/2024  2:43 PM)          Timothy Garduno LPN

## 2024-01-12 ENCOUNTER — TELEPHONE (OUTPATIENT)
Dept: CARDIOLOGY | Facility: CLINIC | Age: 84
End: 2024-01-12
Payer: MEDICARE

## 2024-01-12 NOTE — PROGRESS NOTES
Subjective   Shanika Diaz is a 83 y.o. female.    Chief Complaint:  Hospital Follow-up    Mrs. Diaz returns for an interval follow up. She is seen in collaboration with Dr. Espinosa. She was discharged from [facility] 1/10/24. She was treated for SENIA possibly from HTN emergency. She had also been struggling with recurrent nose bleeds. Upon discharge her amlodipine was increased to 10 mg daily and she was told to hold her aspirin and plavix. She has a follow up with nephrology but not until March. She continues to struggle with nose bleeds, though her one this morning was not nearly as bad as her previous ones. Her BP seems to be staying well controlled on the higher dose of amlodipine. Her only complaint today is fatigue, though this is not new for her. She offers no other complaints or concerns today. She denies any complaints of chest pain, shortness of breath, lightheadedness, dizziness, palpitations, syncope, orthopnea, paroxysmal nocturnal dyspnea, lower extremity swelling or bleeding concerns.          Review of Systems   All other systems reviewed and are negative.      Objective   Physical Exam  Constitutional:       Appearance: Healthy appearance. In no distress  Pulmonary:      Effort: Pulmonary effort is normal.      Breath sounds: Normal breath sounds.   Cardiovascular:      Normal rate. Regular rhythm. Normal S1. Normal S2.       Murmurs: There is  3/6 systolic murmur.      Carotids: right carotid pulse +2, no bruit heard over the right carotid. left carotid pulse +2, no bruit heard over the left carotid.  Edema:     Peripheral edema absent.   Abdominal:      Palpations: Abdomen is soft.   Musculoskeletal:       Cervical back: Normal range of motion.   Skin:     General: Skin is warm and dry. Normal color and pigmentation   Neurological:      Mental Status: Alert and oriented to person, place and time.   Psychiatric:     Mood and Affect: appropriate mood and appropriate affect.     EKG  obtained and reviewed. Normal sinus rhythm. Incomplete right bundle branch block. Anterior infarct, age undetermined. HR 79      Lab Review:   Lab Results   Component Value Date     01/10/2024    K 4.0 01/10/2024     (H) 01/10/2024    CO2 26 01/10/2024    BUN 30 (H) 01/10/2024    CREATININE 2.73 (H) 01/10/2024    GLUCOSE 100 (H) 01/10/2024    CALCIUM 9.4 01/10/2024     Lab Results   Component Value Date    WBC 5.7 01/10/2024    HGB 11.2 (L) 01/10/2024    HCT 35.4 (L) 01/10/2024    MCV 91 01/10/2024     01/10/2024     Lab Results   Component Value Date    CHOL 179 10/26/2022    TRIG 120 10/26/2022    HDL 75.9 10/26/2022       Assessment/Plan   Mrs. Diaz is a pleasant 83 year old  female with a past medical history significant for hypertension, hyperlipidemia and coronary artery disease. Heart catheterization 1/2023 showed a widely patent mid LAD stent with moderate AS. Echocardiogram 5/2023 showed an EF of 60-65% with trace MR and mild-moderate AS. She presents today following a recent hospitalization for hypertensive emergency, recurrent nose bleeds and SENIA. Her BP seems to be staying well controlled since her amlodipine was increased. She had another nose bleed this morning, though it was not as bad as her previous ones. She unfortunately continues to complain of fatigue which is likely multi factorial for her. She does not need to restart plavix, though was educated on the importance of remaining compliant with aspirin given her prior stent. She will continue all other medications unchanged. She will follow up with us in clinic in March as previously scheduled. She knows to call for any concerns.

## 2024-01-12 NOTE — SIGNIFICANT EVENT
Follow Up Phone Call    Outgoing phone call    Spoke to: Shanika Diaz Relationship:self   Phone number: [phone]      Outcome: contacted patient/ family   Chief Complaint   Patient presents with   • Hypertension     Pt sent to the ER by Dr. Huerta for a CT scan. Pt has been getting frequent bloody nose and has been weaker. Pt takes plavix and aspirin at home. Pt states she just feels very tired.           Diagnosis:Not applicable    States she is still having nose bleeds daily. Discussed using  moisturizing nasal spray and humidifier at bedside. Voiced understanding.

## 2024-01-15 ENCOUNTER — OFFICE VISIT (OUTPATIENT)
Dept: GASTROENTEROLOGY | Facility: CLINIC | Age: 84
End: 2024-01-15
Payer: MEDICARE

## 2024-01-15 ENCOUNTER — HOSPITAL ENCOUNTER (OUTPATIENT)
Dept: CARDIOLOGY | Facility: CLINIC | Age: 84
Discharge: HOME | End: 2024-01-15
Payer: MEDICARE

## 2024-01-15 ENCOUNTER — OFFICE VISIT (OUTPATIENT)
Dept: CARDIOLOGY | Facility: CLINIC | Age: 84
End: 2024-01-15
Payer: MEDICARE

## 2024-01-15 VITALS
HEART RATE: 83 BPM | WEIGHT: 182.3 LBS | DIASTOLIC BLOOD PRESSURE: 75 MMHG | OXYGEN SATURATION: 99 % | SYSTOLIC BLOOD PRESSURE: 136 MMHG | BODY MASS INDEX: 31.12 KG/M2 | HEIGHT: 64 IN

## 2024-01-15 DIAGNOSIS — I25.10 CORONARY ARTERY DISEASE, UNSPECIFIED VESSEL OR LESION TYPE, UNSPECIFIED WHETHER ANGINA PRESENT, UNSPECIFIED WHETHER NATIVE OR TRANSPLANTED HEART: Primary | ICD-10-CM

## 2024-01-15 DIAGNOSIS — E78.2 COMBINED HYPERLIPIDEMIA: ICD-10-CM

## 2024-01-15 DIAGNOSIS — I25.10 ATHEROSCLEROSIS OF NATIVE CORONARY ARTERY, UNSPECIFIED WHETHER ANGINA PRESENT, UNSPECIFIED WHETHER NATIVE OR TRANSPLANTED HEART: Primary | ICD-10-CM

## 2024-01-15 DIAGNOSIS — R13.19 ESOPHAGEAL DYSPHAGIA: ICD-10-CM

## 2024-01-15 DIAGNOSIS — I35.0 NONRHEUMATIC AORTIC VALVE STENOSIS: ICD-10-CM

## 2024-01-15 DIAGNOSIS — K57.32 DIVERTICULITIS OF COLON: ICD-10-CM

## 2024-01-15 DIAGNOSIS — R10.13 EPIGASTRIC PAIN: Primary | ICD-10-CM

## 2024-01-15 DIAGNOSIS — I10 BENIGN ESSENTIAL HYPERTENSION: ICD-10-CM

## 2024-01-15 PROCEDURE — 99214 OFFICE O/P EST MOD 30 MIN: CPT | Performed by: NURSE PRACTITIONER

## 2024-01-15 PROCEDURE — 1126F AMNT PAIN NOTED NONE PRSNT: CPT | Performed by: NURSE PRACTITIONER

## 2024-01-15 PROCEDURE — 1159F MED LIST DOCD IN RCRD: CPT | Performed by: NURSE PRACTITIONER

## 2024-01-15 PROCEDURE — 93010 ELECTROCARDIOGRAM REPORT: CPT | Performed by: INTERNAL MEDICINE

## 2024-01-15 PROCEDURE — 1111F DSCHRG MED/CURRENT MED MERGE: CPT

## 2024-01-15 PROCEDURE — 1126F AMNT PAIN NOTED NONE PRSNT: CPT

## 2024-01-15 PROCEDURE — 1111F DSCHRG MED/CURRENT MED MERGE: CPT | Performed by: NURSE PRACTITIONER

## 2024-01-15 PROCEDURE — 93005 ELECTROCARDIOGRAM TRACING: CPT

## 2024-01-15 PROCEDURE — 99204 OFFICE O/P NEW MOD 45 MIN: CPT

## 2024-01-15 PROCEDURE — 1036F TOBACCO NON-USER: CPT

## 2024-01-15 PROCEDURE — 1036F TOBACCO NON-USER: CPT | Performed by: NURSE PRACTITIONER

## 2024-01-15 PROCEDURE — 3075F SYST BP GE 130 - 139MM HG: CPT | Performed by: NURSE PRACTITIONER

## 2024-01-15 PROCEDURE — 1160F RVW MEDS BY RX/DR IN RCRD: CPT | Performed by: NURSE PRACTITIONER

## 2024-01-15 PROCEDURE — 3078F DIAST BP <80 MM HG: CPT | Performed by: NURSE PRACTITIONER

## 2024-01-15 PROCEDURE — 1159F MED LIST DOCD IN RCRD: CPT

## 2024-01-15 RX ORDER — POLYETHYLENE GLYCOL 3350, SODIUM SULFATE ANHYDROUS, SODIUM BICARBONATE, SODIUM CHLORIDE, POTASSIUM CHLORIDE 236; 22.74; 6.74; 5.86; 2.97 G/4L; G/4L; G/4L; G/4L; G/4L
4000 POWDER, FOR SOLUTION ORAL ONCE
Qty: 4000 ML | Refills: 0 | Status: SHIPPED | OUTPATIENT
Start: 2024-01-15 | End: 2024-01-15

## 2024-01-15 RX ORDER — FAMOTIDINE 40 MG/1
40 TABLET, FILM COATED ORAL DAILY
Qty: 30 TABLET | Refills: 3 | Status: SHIPPED | OUTPATIENT
Start: 2024-01-15 | End: 2024-03-15

## 2024-01-15 RX ORDER — ASPIRIN 81 MG/1
81 TABLET ORAL DAILY
Qty: 90 TABLET | Refills: 3
Start: 2024-01-15

## 2024-01-15 ASSESSMENT — ENCOUNTER SYMPTOMS
COUGH: 0
RECTAL PAIN: 0
ABDOMINAL DISTENTION: 0
TROUBLE SWALLOWING: 1
NAUSEA: 0
ANAL BLEEDING: 0
SHORTNESS OF BREATH: 0
VOMITING: 0
DIARRHEA: 0
DEPRESSION: 0
APPETITE CHANGE: 0
FEVER: 0
CHILLS: 0
CONSTIPATION: 0
OCCASIONAL FEELINGS OF UNSTEADINESS: 0
ABDOMINAL PAIN: 1
BLOOD IN STOOL: 0
LOSS OF SENSATION IN FEET: 0
FATIGUE: 0

## 2024-01-15 ASSESSMENT — PAIN SCALES - GENERAL: PAINLEVEL: 0-NO PAIN

## 2024-01-15 NOTE — ASSESSMENT & PLAN NOTE
Consider esophagitis, duodenitis, gastritis, PUD, esophageal stricture  -Start 40 mg Pepcid daily  -Schedule EGD  -Antireflux regimen    Follow-up 3 weeks postprocedure

## 2024-01-15 NOTE — PROGRESS NOTES
Subjective     History of Present Illness:   Shanika Diaz is a 83 y.o. female with PMHx of atherosclerosis, bladder hernia, diverticulitis, AS, AV stenosis, connective tissue disease, GERD, CAD, lymphocytic colitis who presents to GI clinic for further evaluation diverticulitis  12/2023 hospital visit for sigmoid diverticulitis.  Treated with Cipro/Flagyl.  CT scan showed sigmoid diverticulitis with 1.5 cm diverticular abscess.  Repeat CT 1/2024 suggest resolved diverticulitis.    Today, patient had bladder infection at the same time as diverticulitis. Prior to this episode, had not moved bowels for 1 week.  Has LLQ pain.  Has intermittent epigastric pain after eating too much and randomly after eating, constant epigastric and LUQ tenderness. Food sticks approximately monthly when eating.  States this is her 2nd episode of diverticulitis.  Denies NSAID use.  Has been taken off plavix since hospitalization.  Now moving bowels daily and taking miralax every other day.  Denies constipation, diarrhea, melena, hematochezia,unintentional weight loss    Denies ETOH, smoking, marijuana  Denies fxh GI cancer or IBD  Abdominal Surgeries: denies    Last colonoscopy 1/2017 Dr. Luciano for history of polyps: Diverticulosis sigmoid, descending, ascending.  Biopsies microscopic colitis: Positive lymphocytic colitis  Last EGD 1/2017 Dr. Luciano: Duodenal biopsies negative, stomach: Mild reactive gastropathy.  Negative negative H. pylori    5/23 echo Left ventricular systolic function is normal with a 60-65% estimated ejection fraction.  2. Spectral Doppler shows an impaired relaxation pattern of left ventricular diastolic filling.  3. Trace mitral valve regurgitation.  4. Mild to moderate aortic valve stenosis.  5. The aortic valve appears tricuspid with restriction.  6. The peak instantaneous gradient of the aortic valve is 32.5 mmHg.  Past Medical History  As per HPI.     Social History  she  reports that she has never  smoked. She has never used smokeless tobacco. She reports current alcohol use of about 14.0 standard drinks of alcohol per week. She reports that she does not use drugs.     Family History  her family history includes Arthritis in her mother; Atrial fibrillation in her brother; Cardiac disorder in her brother; Cerebral aneurysm in her father; Hyperlipidemia in her mother; Hypertension in her brother; Lymphoma in her brother.     Review of Systems  Review of Systems   Constitutional:  Negative for appetite change, chills, fatigue and fever.   HENT:  Positive for trouble swallowing.    Respiratory:  Negative for cough and shortness of breath.    Gastrointestinal:  Positive for abdominal pain (Epigastric, LUQ). Negative for abdominal distention, anal bleeding, blood in stool, constipation, diarrhea, nausea, rectal pain and vomiting.       Allergies  Allergies   Allergen Reactions    Sulfa (Sulfonamide Antibiotics) Swelling, Rash and Angioedema    Codeine Hives    Penicillin Hives     Sever reaction ended up in ED       Medications  Current Outpatient Medications   Medication Instructions    amLODIPine (NORVASC) 10 mg, oral, Daily    aspirin 81 mg, oral, Daily    cholecalciferol (Vitamin D-3) 25 MCG (1000 UT) tablet 1 tablet, oral, Daily    ezetimibe (ZETIA) 10 mg, oral, Nightly    famotidine (PEPCID) 40 mg, oral, Daily, Take 30-60 minutes before a meal    incobotulinumtoxinA (Xeomin) 50 Units recon soln intramuscular, Every 3 months    latanoprost (Xalatan) 0.005 % ophthalmic solution ophthalmic (eye)    metoprolol succinate XL (TOPROL-XL) 25 mg, oral, Daily    nitroglycerin (NITROSTAT) 0.4 mg, sublingual, Every 5 min PRN    oxymetazoline (Afrin) 0.05 % nasal spray 2 sprays, Each Nostril, Every 12 hours PRN, Do not use for more than 3 days.    polyethylene glycol (GLYCOLAX, MIRALAX) 17 g, oral, Daily    polyethylene glycol-electrolytes (Golytely) 420 gram solution 4,000 mL, oral, Once, Drink 1/2 starting at 6:00 pm the  night prior to the procedure and the other 1/2 5 hours prior to the procedure.    rosuvastatin (CRESTOR) 5 mg, oral, Daily        Objective   There were no vitals taken for this visit.   Physical Exam  Constitutional:       Appearance: Normal appearance. She is normal weight.   HENT:      Mouth/Throat:      Mouth: Mucous membranes are dry.      Pharynx: Oropharynx is clear.   Cardiovascular:      Rate and Rhythm: Normal rate and regular rhythm.      Heart sounds: Murmur heard.   Pulmonary:      Effort: Pulmonary effort is normal.      Breath sounds: Normal breath sounds. No wheezing or rhonchi.   Abdominal:      General: Abdomen is flat. Bowel sounds are normal. There is no distension.      Palpations: Abdomen is soft. There is no hepatomegaly.      Tenderness: There is abdominal tenderness (Epigastric, LUQ). There is no guarding or rebound. Negative signs include Colmenares's sign.      Hernia: No hernia is present.   Musculoskeletal:         General: Normal range of motion.   Skin:     General: Skin is warm and dry.   Neurological:      General: No focal deficit present.      Mental Status: She is alert and oriented to person, place, and time.   Psychiatric:         Mood and Affect: Mood normal.         Behavior: Behavior normal.           Lab Results   Component Value Date    WBC 5.7 01/10/2024    WBC 5.2 01/09/2024    WBC 6.4 01/08/2024    HGB 11.2 (L) 01/10/2024    HGB 10.9 (L) 01/09/2024    HGB 12.5 01/08/2024    HCT 35.4 (L) 01/10/2024    HCT 34.6 (L) 01/09/2024    HCT 39.3 01/08/2024     01/10/2024     01/09/2024     (H) 01/08/2024     Lab Results   Component Value Date     01/10/2024     01/09/2024     01/08/2024    K 4.0 01/10/2024    K 4.2 01/09/2024    K 4.3 01/08/2024     (H) 01/10/2024     01/09/2024     01/08/2024    CO2 26 01/10/2024    CO2 25 01/09/2024    CO2 23 01/08/2024    BUN 30 (H) 01/10/2024    BUN 27 (H) 01/09/2024    BUN 30 (H) 01/08/2024     CREATININE 2.73 (H) 01/10/2024    CREATININE 2.73 (H) 01/09/2024    CREATININE 3.02 (H) 01/08/2024    CALCIUM 9.4 01/10/2024    CALCIUM 9.3 01/09/2024    CALCIUM 9.5 01/08/2024    PROT 6.4 01/10/2024    PROT 7.0 01/08/2024    PROT 7.1 12/16/2023    BILITOT 0.5 01/10/2024    BILITOT 0.4 01/08/2024    BILITOT 0.5 12/16/2023    ALKPHOS 65 01/10/2024    ALKPHOS 76 01/08/2024    ALKPHOS 110 12/16/2023    ALT 10 01/10/2024    ALT 12 01/08/2024    ALT 19 12/16/2023    AST 10 01/10/2024    AST 16 01/08/2024    AST 16 12/16/2023    GLUCOSE 100 (H) 01/10/2024    GLUCOSE 99 01/09/2024    GLUCOSE 94 01/08/2024           Shanika Diaz is a 83 y.o. female who presents to GI clinic for diverticulitis.    Epigastric pain  Consider esophagitis, duodenitis, gastritis, PUD, esophageal stricture  -Start 40 mg Pepcid daily  -Schedule EGD  -Antireflux regimen    Follow-up 3 weeks postprocedure    Diverticulitis of colon  -Schedule colonoscopy.  GoLytely prep  Recommendation to hold Plavix 5 days prior to procedure         Zulma Ocampo, APRN-CNP

## 2024-01-15 NOTE — PATIENT INSTRUCTIONS
I recommend you continue your miralax as you are taking it  Please take Pepcid 30-60 minutes before a meal daily   Try to minimize acidic foods such as citrus fruits, tomatoes, and pop. Also try to avoid chocolate, peppermint, alcohol, and caffeine.  Avoid eating within 2-3 hours of lying down.   Eat more frequent smaller meals instead of a few large meals.  You can prop the head of your bed up when you are sleeping to decrease reflux.  Please schedule your upper endoscopy and colonoscopy. You will need a ride since this involves sedation.  I will send a bowel prep to your pharmacy.  Clear liquid diet the day before the procedure. Start the 1st part of the prep at 6 pm the night prior and the other half 5 hrs before the procedure.  Please hold Plavix 5 days prior to your procedure.  Confirm with prescribing physician.    Please follow up 3 weeks post procedure

## 2024-01-16 LAB
ATRIAL RATE: 79 BPM
P AXIS: 40 DEGREES
P OFFSET: 188 MS
P ONSET: 132 MS
PR INTERVAL: 182 MS
Q ONSET: 223 MS
QRS COUNT: 13 BEATS
QRS DURATION: 92 MS
QT INTERVAL: 388 MS
QTC CALCULATION(BAZETT): 444 MS
QTC FREDERICIA: 425 MS
R AXIS: 18 DEGREES
T AXIS: 36 DEGREES
T OFFSET: 417 MS
VENTRICULAR RATE: 79 BPM

## 2024-01-17 ENCOUNTER — LAB (OUTPATIENT)
Dept: LAB | Facility: LAB | Age: 84
End: 2024-01-17
Payer: MEDICARE

## 2024-01-17 DIAGNOSIS — N17.9 ACUTE KIDNEY INJURY (CMS-HCC): ICD-10-CM

## 2024-01-17 LAB
ALBUMIN SERPL BCP-MCNC: 4.3 G/DL (ref 3.4–5)
ANION GAP SERPL CALC-SCNC: 15 MMOL/L (ref 10–20)
BUN SERPL-MCNC: 20 MG/DL (ref 6–23)
CALCIUM SERPL-MCNC: 9.9 MG/DL (ref 8.6–10.6)
CHLORIDE SERPL-SCNC: 109 MMOL/L (ref 98–107)
CO2 SERPL-SCNC: 27 MMOL/L (ref 21–32)
CREAT SERPL-MCNC: 1.75 MG/DL (ref 0.5–1.05)
EGFRCR SERPLBLD CKD-EPI 2021: 29 ML/MIN/1.73M*2
GLUCOSE SERPL-MCNC: 99 MG/DL (ref 74–99)
PHOSPHATE SERPL-MCNC: 4 MG/DL (ref 2.5–4.9)
POTASSIUM SERPL-SCNC: 4.6 MMOL/L (ref 3.5–5.3)
SODIUM SERPL-SCNC: 146 MMOL/L (ref 136–145)

## 2024-01-17 PROCEDURE — 80069 RENAL FUNCTION PANEL: CPT

## 2024-01-17 PROCEDURE — 36415 COLL VENOUS BLD VENIPUNCTURE: CPT

## 2024-01-18 ENCOUNTER — APPOINTMENT (OUTPATIENT)
Dept: SURGERY | Facility: CLINIC | Age: 84
End: 2024-01-18
Payer: MEDICARE

## 2024-01-18 DIAGNOSIS — N17.9 AKI (ACUTE KIDNEY INJURY) (CMS-HCC): Primary | ICD-10-CM

## 2024-01-24 ENCOUNTER — APPOINTMENT (OUTPATIENT)
Dept: GASTROENTEROLOGY | Facility: CLINIC | Age: 84
End: 2024-01-24
Payer: MEDICARE

## 2024-01-25 ENCOUNTER — PATIENT OUTREACH (OUTPATIENT)
Dept: CARE COORDINATION | Facility: CLINIC | Age: 84
End: 2024-01-25
Payer: MEDICARE

## 2024-01-25 NOTE — PROGRESS NOTES
Call regarding appt. with PCP on (n/a) after hospitalization.  At time of outreach call the patient feels as if their condition has (improved) since last visit.  Reviewed the PCP appointment with the pt and addressed any questions or concerns.  Timothy Garduno LPN

## 2024-02-08 DIAGNOSIS — I10 BENIGN ESSENTIAL HYPERTENSION: Primary | ICD-10-CM

## 2024-02-08 RX ORDER — AMLODIPINE BESYLATE 10 MG/1
10 TABLET ORAL DAILY
Qty: 90 TABLET | Refills: 3 | Status: SHIPPED | OUTPATIENT
Start: 2024-02-08 | End: 2024-03-08 | Stop reason: SDUPTHER

## 2024-02-13 ENCOUNTER — OFFICE VISIT (OUTPATIENT)
Dept: GASTROENTEROLOGY | Facility: EXTERNAL LOCATION | Age: 84
End: 2024-02-13
Payer: MEDICARE

## 2024-02-13 DIAGNOSIS — D12.4 BENIGN NEOPLASM OF DESCENDING COLON: ICD-10-CM

## 2024-02-13 DIAGNOSIS — K31.89 OTHER DISEASES OF STOMACH AND DUODENUM: ICD-10-CM

## 2024-02-13 DIAGNOSIS — K64.8 INTERNAL HEMORRHOIDS: ICD-10-CM

## 2024-02-13 DIAGNOSIS — K57.30 DIVERTICULOSIS OF LARGE INTESTINE WITHOUT DIVERTICULITIS: ICD-10-CM

## 2024-02-13 DIAGNOSIS — K44.9 HIATAL HERNIA: ICD-10-CM

## 2024-02-13 DIAGNOSIS — K21.00 GASTROESOPHAGEAL REFLUX DISEASE WITH ESOPHAGITIS WITHOUT HEMORRHAGE: Primary | ICD-10-CM

## 2024-02-13 DIAGNOSIS — R10.13 EPIGASTRIC PAIN: ICD-10-CM

## 2024-02-13 DIAGNOSIS — K57.32 DIVERTICULITIS OF COLON: ICD-10-CM

## 2024-02-13 PROCEDURE — 1160F RVW MEDS BY RX/DR IN RCRD: CPT | Performed by: INTERNAL MEDICINE

## 2024-02-13 PROCEDURE — 88305 TISSUE EXAM BY PATHOLOGIST: CPT | Performed by: PATHOLOGY

## 2024-02-13 PROCEDURE — 88305 TISSUE EXAM BY PATHOLOGIST: CPT

## 2024-02-13 PROCEDURE — 1036F TOBACCO NON-USER: CPT | Performed by: INTERNAL MEDICINE

## 2024-02-13 PROCEDURE — 43239 EGD BIOPSY SINGLE/MULTIPLE: CPT | Performed by: INTERNAL MEDICINE

## 2024-02-13 PROCEDURE — 1126F AMNT PAIN NOTED NONE PRSNT: CPT | Performed by: INTERNAL MEDICINE

## 2024-02-13 PROCEDURE — 1159F MED LIST DOCD IN RCRD: CPT | Performed by: INTERNAL MEDICINE

## 2024-02-13 PROCEDURE — 0753T DGTZ GLS MCRSCP SLD LEVEL IV: CPT

## 2024-02-13 PROCEDURE — 45385 COLONOSCOPY W/LESION REMOVAL: CPT | Performed by: INTERNAL MEDICINE

## 2024-02-13 RX ORDER — OMEPRAZOLE 40 MG/1
40 CAPSULE, DELAYED RELEASE ORAL 2 TIMES DAILY
Qty: 60 CAPSULE | Refills: 1 | Status: SHIPPED | OUTPATIENT
Start: 2024-02-13 | End: 2024-04-13

## 2024-02-14 ENCOUNTER — LAB REQUISITION (OUTPATIENT)
Dept: LAB | Facility: HOSPITAL | Age: 84
End: 2024-02-14
Payer: MEDICARE

## 2024-02-16 DIAGNOSIS — E78.2 COMBINED HYPERLIPIDEMIA: Primary | ICD-10-CM

## 2024-02-16 RX ORDER — EZETIMIBE 10 MG/1
10 TABLET ORAL NIGHTLY
Qty: 90 TABLET | Refills: 3 | Status: SHIPPED | OUTPATIENT
Start: 2024-02-16

## 2024-02-24 LAB
LABORATORY COMMENT REPORT: NORMAL
PATH REPORT.FINAL DX SPEC: NORMAL
PATH REPORT.GROSS SPEC: NORMAL
PATH REPORT.RELEVANT HX SPEC: NORMAL
PATH REPORT.TOTAL CANCER: NORMAL

## 2024-02-26 NOTE — RESULT ENCOUNTER NOTE
Carissa BAKER: The polyp that was removed from your colon was an adenoma (benign but precancerous).  The biopsies that were taken from your stomach did not show evidence of H. Pylori bacteria infection.  The recommendation is to follow up in the office as needed.  Further surveillance colonoscopy is not recommended given age.      Gregor Henriquez MD

## 2024-03-08 ENCOUNTER — OFFICE VISIT (OUTPATIENT)
Dept: PRIMARY CARE | Facility: CLINIC | Age: 84
End: 2024-03-08
Payer: MEDICARE

## 2024-03-08 VITALS
HEART RATE: 88 BPM | DIASTOLIC BLOOD PRESSURE: 64 MMHG | HEIGHT: 64 IN | SYSTOLIC BLOOD PRESSURE: 122 MMHG | OXYGEN SATURATION: 96 % | BODY MASS INDEX: 31.24 KG/M2 | WEIGHT: 183 LBS

## 2024-03-08 DIAGNOSIS — I87.2 CHRONIC VENOUS STASIS DERMATITIS: ICD-10-CM

## 2024-03-08 DIAGNOSIS — I10 BENIGN ESSENTIAL HYPERTENSION: Primary | ICD-10-CM

## 2024-03-08 PROBLEM — G47.00 INSOMNIA: Status: ACTIVE | Noted: 2024-03-08

## 2024-03-08 PROBLEM — R35.0 INCREASED FREQUENCY OF URINATION: Status: RESOLVED | Noted: 2024-03-08 | Resolved: 2024-03-08

## 2024-03-08 PROBLEM — L85.1 ACQUIRED KERATODERMA: Status: ACTIVE | Noted: 2023-11-06

## 2024-03-08 PROBLEM — E16.2 HYPOGLYCEMIA: Status: ACTIVE | Noted: 2024-03-08

## 2024-03-08 PROBLEM — R06.09 DYSPNEA ON EXERTION: Status: RESOLVED | Noted: 2024-03-08 | Resolved: 2024-03-08

## 2024-03-08 PROBLEM — L30.1 VESICULAR ECZEMA: Status: ACTIVE | Noted: 2024-03-08

## 2024-03-08 PROBLEM — K52.9 COLITIS: Status: ACTIVE | Noted: 2023-08-28

## 2024-03-08 PROBLEM — H66.90 OTITIS MEDIA: Status: RESOLVED | Noted: 2024-03-08 | Resolved: 2024-03-08

## 2024-03-08 PROBLEM — B02.9 HERPES ZOSTER: Status: ACTIVE | Noted: 2024-03-08

## 2024-03-08 PROBLEM — R04.0 EPISTAXIS: Status: RESOLVED | Noted: 2024-03-08 | Resolved: 2024-03-08

## 2024-03-08 PROBLEM — H40.9 GLAUCOMA: Status: ACTIVE | Noted: 2024-03-08

## 2024-03-08 PROBLEM — R25.2 CALF CRAMP: Status: ACTIVE | Noted: 2024-03-08

## 2024-03-08 PROBLEM — K57.92 DIVERTICULITIS: Status: RESOLVED | Noted: 2023-03-20 | Resolved: 2024-03-08

## 2024-03-08 PROBLEM — R12 HEARTBURN: Status: ACTIVE | Noted: 2024-03-08

## 2024-03-08 PROBLEM — I16.1 HYPERTENSIVE EMERGENCY: Status: ACTIVE | Noted: 2024-03-08

## 2024-03-08 PROBLEM — R14.2 BURPING: Status: ACTIVE | Noted: 2024-03-08

## 2024-03-08 PROBLEM — K57.92 ACUTE DIVERTICULITIS: Status: RESOLVED | Noted: 2023-12-16 | Resolved: 2024-03-08

## 2024-03-08 PROBLEM — N81.10 FEMALE CYSTOCELE: Status: ACTIVE | Noted: 2024-03-08

## 2024-03-08 PROBLEM — G47.9 DISTURBANCE IN SLEEP BEHAVIOR: Status: ACTIVE | Noted: 2024-03-08

## 2024-03-08 PROBLEM — L65.9 ALOPECIA: Status: ACTIVE | Noted: 2024-03-08

## 2024-03-08 PROBLEM — M25.519 SHOULDER PAIN: Status: RESOLVED | Noted: 2024-03-08 | Resolved: 2024-03-08

## 2024-03-08 PROBLEM — E55.9 VITAMIN D DEFICIENCY: Status: ACTIVE | Noted: 2024-03-08

## 2024-03-08 PROBLEM — A04.8 HELICOBACTER PYLORI INFECTION: Status: RESOLVED | Noted: 2024-03-08 | Resolved: 2024-03-08

## 2024-03-08 PROBLEM — N95.0 POSTMENOPAUSAL BLEEDING: Status: ACTIVE | Noted: 2024-03-08

## 2024-03-08 PROBLEM — N39.0 URINARY TRACT INFECTION: Status: RESOLVED | Noted: 2024-03-08 | Resolved: 2024-03-08

## 2024-03-08 PROBLEM — M79.672 LEFT FOOT PAIN: Status: RESOLVED | Noted: 2024-03-08 | Resolved: 2024-03-08

## 2024-03-08 PROBLEM — M79.673 HEEL PAIN: Status: RESOLVED | Noted: 2024-03-08 | Resolved: 2024-03-08

## 2024-03-08 PROBLEM — R19.7 DIARRHEA: Status: RESOLVED | Noted: 2024-03-08 | Resolved: 2024-03-08

## 2024-03-08 PROBLEM — R14.0 ABDOMINAL BLOATING: Status: ACTIVE | Noted: 2024-03-08

## 2024-03-08 PROBLEM — R51.9 ACUTE FACIAL PAIN: Status: RESOLVED | Noted: 2024-03-08 | Resolved: 2024-03-08

## 2024-03-08 PROBLEM — L84 CALLUS: Status: ACTIVE | Noted: 2024-03-08

## 2024-03-08 PROBLEM — K62.89 ANAL PAIN: Status: ACTIVE | Noted: 2024-03-08

## 2024-03-08 PROBLEM — S43.409A SPRAIN OF SHOULDER: Status: RESOLVED | Noted: 2024-03-08 | Resolved: 2024-03-08

## 2024-03-08 PROBLEM — E66.9 OBESITY WITH BODY MASS INDEX 30 OR GREATER: Status: ACTIVE | Noted: 2024-03-08

## 2024-03-08 PROBLEM — J20.9 ACUTE BRONCHITIS: Status: RESOLVED | Noted: 2024-03-08 | Resolved: 2024-03-08

## 2024-03-08 PROBLEM — R10.814 ABDOMINAL TENDERNESS OF LEFT LOWER QUADRANT: Status: ACTIVE | Noted: 2023-08-07

## 2024-03-08 PROBLEM — R21 RASH: Status: RESOLVED | Noted: 2024-03-08 | Resolved: 2024-03-08

## 2024-03-08 PROBLEM — K29.70 GASTRITIS: Status: RESOLVED | Noted: 2024-03-08 | Resolved: 2024-03-08

## 2024-03-08 PROBLEM — R51.9 HEADACHE: Status: RESOLVED | Noted: 2024-03-08 | Resolved: 2024-03-08

## 2024-03-08 PROBLEM — J01.00 ACUTE NON-RECURRENT MAXILLARY SINUSITIS: Status: RESOLVED | Noted: 2023-11-06 | Resolved: 2024-03-08

## 2024-03-08 PROBLEM — R53.83 FATIGUE: Status: ACTIVE | Noted: 2023-12-11

## 2024-03-08 PROBLEM — R68.89 FORGETFULNESS: Status: ACTIVE | Noted: 2024-03-08

## 2024-03-08 PROBLEM — F41.9 ANXIETY: Status: ACTIVE | Noted: 2023-08-07

## 2024-03-08 PROBLEM — M54.2 NECK PAIN: Status: RESOLVED | Noted: 2024-03-08 | Resolved: 2024-03-08

## 2024-03-08 PROBLEM — T75.3XXA MOTION SICKNESS: Status: ACTIVE | Noted: 2024-03-08

## 2024-03-08 PROCEDURE — 1159F MED LIST DOCD IN RCRD: CPT | Performed by: FAMILY MEDICINE

## 2024-03-08 PROCEDURE — 1036F TOBACCO NON-USER: CPT | Performed by: FAMILY MEDICINE

## 2024-03-08 PROCEDURE — 1126F AMNT PAIN NOTED NONE PRSNT: CPT | Performed by: FAMILY MEDICINE

## 2024-03-08 PROCEDURE — 99213 OFFICE O/P EST LOW 20 MIN: CPT | Performed by: FAMILY MEDICINE

## 2024-03-08 PROCEDURE — 1160F RVW MEDS BY RX/DR IN RCRD: CPT | Performed by: FAMILY MEDICINE

## 2024-03-08 PROCEDURE — 1123F ACP DISCUSS/DSCN MKR DOCD: CPT | Performed by: FAMILY MEDICINE

## 2024-03-08 PROCEDURE — 3078F DIAST BP <80 MM HG: CPT | Performed by: FAMILY MEDICINE

## 2024-03-08 PROCEDURE — 1158F ADVNC CARE PLAN TLK DOCD: CPT | Performed by: FAMILY MEDICINE

## 2024-03-08 PROCEDURE — 3074F SYST BP LT 130 MM HG: CPT | Performed by: FAMILY MEDICINE

## 2024-03-08 RX ORDER — METOPROLOL SUCCINATE 50 MG/1
50 TABLET, EXTENDED RELEASE ORAL DAILY
Qty: 90 TABLET | Refills: 3 | Status: SHIPPED | OUTPATIENT
Start: 2024-03-08

## 2024-03-08 RX ORDER — AMLODIPINE BESYLATE 5 MG/1
5 TABLET ORAL DAILY
Qty: 90 TABLET | Refills: 3 | Status: SHIPPED | OUTPATIENT
Start: 2024-03-08 | End: 2025-03-08

## 2024-03-08 RX ORDER — POLYETHYLENE GLYCOL-3350 AND ELECTROLYTES 236; 6.74; 5.86; 2.97; 22.74 G/274.31G; G/274.31G; G/274.31G; G/274.31G; G/274.31G
POWDER, FOR SOLUTION ORAL
COMMUNITY
Start: 2024-01-15

## 2024-03-08 ASSESSMENT — ENCOUNTER SYMPTOMS
NAUSEA: 0
DEPRESSION: 0
OCCASIONAL FEELINGS OF UNSTEADINESS: 0
LEG PAIN: 1
LOSS OF SENSATION IN FEET: 0
APPETITE CHANGE: 0
UNEXPECTED WEIGHT CHANGE: 0

## 2024-03-08 ASSESSMENT — PATIENT HEALTH QUESTIONNAIRE - PHQ9
1. LITTLE INTEREST OR PLEASURE IN DOING THINGS: NOT AT ALL
2. FEELING DOWN, DEPRESSED OR HOPELESS: NOT AT ALL
SUM OF ALL RESPONSES TO PHQ9 QUESTIONS 1 AND 2: 0

## 2024-03-08 ASSESSMENT — COLUMBIA-SUICIDE SEVERITY RATING SCALE - C-SSRS
6. HAVE YOU EVER DONE ANYTHING, STARTED TO DO ANYTHING, OR PREPARED TO DO ANYTHING TO END YOUR LIFE?: NO
2. HAVE YOU ACTUALLY HAD ANY THOUGHTS OF KILLING YOURSELF?: NO
1. IN THE PAST MONTH, HAVE YOU WISHED YOU WERE DEAD OR WISHED YOU COULD GO TO SLEEP AND NOT WAKE UP?: NO

## 2024-03-08 NOTE — PROGRESS NOTES
"Subjective   Patient ID: Shanika Diaz is a 83 y.o. female who presents for Leg Pain (Bilateral red spots and swelling in the legs ).    Leg Pain   Patient complains of bilateral ankle and leg swelling which started after her last hospitalization when her amlodipine was increased from 5 daily to 10 daily  The swelling goes down completely overnight  Recently she has started getting some red spots on her bilateral anterior legs which she says are itchy and she is applying moisturizer after bathing  There is no chest pain shortness of breath, no increased abdominal girth, no paroxysmal nocturnal dyspnea  She does note that she has to urinate every hour and a half or so overnight since her legs started swelling    Review of Systems   Constitutional:  Negative for appetite change and unexpected weight change.   Eyes:  Negative for visual disturbance.   Gastrointestinal:  Negative for nausea.       Objective   /64   Pulse 88   Ht 1.626 m (5' 4\")   Wt 83 kg (183 lb)   SpO2 96%   BMI 31.41 kg/m²     Physical Exam  HENT:      Head: Normocephalic and atraumatic.      Nose: Nose normal.      Mouth/Throat:      Mouth: Mucous membranes are moist.      Pharynx: No oropharyngeal exudate.   Eyes:      Extraocular Movements: Extraocular movements intact.      Conjunctiva/sclera: Conjunctivae normal.      Pupils: Pupils are equal, round, and reactive to light.   Cardiovascular:      Rate and Rhythm: Normal rate and regular rhythm.   Pulmonary:      Effort: Pulmonary effort is normal.   Abdominal:      General: There is no distension.      Palpations: Abdomen is soft.   Musculoskeletal:      Cervical back: Normal range of motion and neck supple.   Lymphadenopathy:      Cervical: No cervical adenopathy.   Neurological:      General: No focal deficit present.      Mental Status: She is alert.   Psychiatric:         Attention and Perception: Attention normal.         Speech: Speech normal.         Behavior: Behavior is " cooperative.     Bilateral legs have trace pretibial pitting edema with 1+ pitting edema over the lateral ankles bilaterally, there is also a lacy erythematous pruritic macular eruption over bilateral anterior lateral legs    Assessment/Plan   Diagnoses and all orders for this visit:  Benign essential hypertension  Comments:  Back to 5 mg daily of amlodipine  Double up metoprolol to 50 mg daily  Follow blood pressures at home  Orders:  -     amLODIPine (Norvasc) 5 mg tablet; Take 1 tablet (5 mg) by mouth once daily.  -     metoprolol succinate XL (Toprol-XL) 50 mg 24 hr tablet; Take 1 tablet (50 mg) by mouth once daily.  Chronic venous stasis dermatitis  Comments:  Leg elevation and avoidance of salt  Also local care discussed with hydrocortisone cream and moisturizers   is present  Recheck 2 months sooner if any issues arise

## 2024-03-11 ENCOUNTER — TELEPHONE (OUTPATIENT)
Dept: PRIMARY CARE | Facility: CLINIC | Age: 84
End: 2024-03-11
Payer: MEDICARE

## 2024-03-11 NOTE — TELEPHONE ENCOUNTER
Pt notified said she completely forgot about checking her blood pressure and never made the changes you recommended. She has been using cortisone cream on the rash it has spread but is lighter in color will add a moisturizer also. Has been elevating legs and watching the salt. Will call me back with a B/P reading.

## 2024-03-11 NOTE — TELEPHONE ENCOUNTER
----- Message from Rodríguez Beth MD sent at 3/11/2024  9:47 AM EDT -----  Regarding: Blood pressure meds  Please let patient know Dr. Espinosa approved of the changes we made last week at the appointment  See if her blood pressure is okay

## 2024-03-15 DIAGNOSIS — I25.10 CAD (CORONARY ARTERY DISEASE): Primary | ICD-10-CM

## 2024-03-15 RX ORDER — ROSUVASTATIN CALCIUM 5 MG/1
5 TABLET, COATED ORAL DAILY
Qty: 90 TABLET | Refills: 3 | Status: SHIPPED | OUTPATIENT
Start: 2024-03-15 | End: 2025-03-15

## 2024-03-18 ENCOUNTER — APPOINTMENT (OUTPATIENT)
Dept: CARDIOLOGY | Facility: CLINIC | Age: 84
End: 2024-03-18
Payer: MEDICARE

## 2024-03-18 ENCOUNTER — TELEPHONE (OUTPATIENT)
Dept: PRIMARY CARE | Facility: CLINIC | Age: 84
End: 2024-03-18
Payer: MEDICARE

## 2024-03-18 NOTE — TELEPHONE ENCOUNTER
PT is calling Nilsa back with several days of bp readings    3/12   125/74   73  3/13   133/77   72  3/14   134/71   80  3/15   126/70   72  3/16   126/74   67  3/17   125/67   76     Pt states her legs are still swollen but not as bad.  Rash is going away.

## 2024-03-19 ENCOUNTER — LAB (OUTPATIENT)
Dept: LAB | Facility: LAB | Age: 84
End: 2024-03-19
Payer: MEDICARE

## 2024-03-19 DIAGNOSIS — N17.9 AKI (ACUTE KIDNEY INJURY) (CMS-HCC): ICD-10-CM

## 2024-03-19 LAB
ALBUMIN SERPL BCP-MCNC: 4.5 G/DL (ref 3.4–5)
ANION GAP SERPL CALC-SCNC: 13 MMOL/L (ref 10–20)
BUN SERPL-MCNC: 21 MG/DL (ref 6–23)
CALCIUM SERPL-MCNC: 9.9 MG/DL (ref 8.6–10.6)
CHLORIDE SERPL-SCNC: 107 MMOL/L (ref 98–107)
CO2 SERPL-SCNC: 29 MMOL/L (ref 21–32)
CREAT SERPL-MCNC: 1.2 MG/DL (ref 0.5–1.05)
EGFRCR SERPLBLD CKD-EPI 2021: 45 ML/MIN/1.73M*2
GLUCOSE SERPL-MCNC: 88 MG/DL (ref 74–99)
PHOSPHATE SERPL-MCNC: 4 MG/DL (ref 2.5–4.9)
POTASSIUM SERPL-SCNC: 4.9 MMOL/L (ref 3.5–5.3)
SODIUM SERPL-SCNC: 144 MMOL/L (ref 136–145)

## 2024-03-19 PROCEDURE — 80069 RENAL FUNCTION PANEL: CPT

## 2024-03-19 PROCEDURE — 36415 COLL VENOUS BLD VENIPUNCTURE: CPT

## 2024-03-22 RX ORDER — TRIAMCINOLONE ACETONIDE 1 MG/G
CREAM TOPICAL
COMMUNITY
Start: 2024-03-12

## 2024-03-25 ENCOUNTER — OFFICE VISIT (OUTPATIENT)
Dept: NEPHROLOGY | Facility: CLINIC | Age: 84
End: 2024-03-25
Payer: MEDICARE

## 2024-03-25 ENCOUNTER — HOSPITAL ENCOUNTER (OUTPATIENT)
Dept: CARDIOLOGY | Facility: CLINIC | Age: 84
Discharge: HOME | End: 2024-03-25
Payer: MEDICARE

## 2024-03-25 ENCOUNTER — OFFICE VISIT (OUTPATIENT)
Dept: CARDIOLOGY | Facility: CLINIC | Age: 84
End: 2024-03-25
Payer: MEDICARE

## 2024-03-25 VITALS
BODY MASS INDEX: 30.56 KG/M2 | OXYGEN SATURATION: 99 % | HEIGHT: 64 IN | DIASTOLIC BLOOD PRESSURE: 66 MMHG | HEART RATE: 74 BPM | TEMPERATURE: 96.9 F | SYSTOLIC BLOOD PRESSURE: 124 MMHG | WEIGHT: 179 LBS

## 2024-03-25 VITALS
BODY MASS INDEX: 31.45 KG/M2 | SYSTOLIC BLOOD PRESSURE: 135 MMHG | DIASTOLIC BLOOD PRESSURE: 75 MMHG | HEART RATE: 63 BPM | HEIGHT: 64 IN | WEIGHT: 184.2 LBS

## 2024-03-25 DIAGNOSIS — N17.9 ACUTE KIDNEY INJURY (CMS-HCC): ICD-10-CM

## 2024-03-25 DIAGNOSIS — I10 BENIGN ESSENTIAL HYPERTENSION: ICD-10-CM

## 2024-03-25 DIAGNOSIS — N18.32 CHRONIC KIDNEY DISEASE, STAGE 3B (MULTI): Primary | ICD-10-CM

## 2024-03-25 DIAGNOSIS — I35.0 NONRHEUMATIC AORTIC VALVE STENOSIS: ICD-10-CM

## 2024-03-25 PROCEDURE — 1123F ACP DISCUSS/DSCN MKR DOCD: CPT | Performed by: INTERNAL MEDICINE

## 2024-03-25 PROCEDURE — 1160F RVW MEDS BY RX/DR IN RCRD: CPT | Performed by: INTERNAL MEDICINE

## 2024-03-25 PROCEDURE — 3078F DIAST BP <80 MM HG: CPT | Performed by: INTERNAL MEDICINE

## 2024-03-25 PROCEDURE — 99214 OFFICE O/P EST MOD 30 MIN: CPT | Performed by: INTERNAL MEDICINE

## 2024-03-25 PROCEDURE — 99215 OFFICE O/P EST HI 40 MIN: CPT | Performed by: INTERNAL MEDICINE

## 2024-03-25 PROCEDURE — 1159F MED LIST DOCD IN RCRD: CPT | Performed by: INTERNAL MEDICINE

## 2024-03-25 PROCEDURE — 93010 ELECTROCARDIOGRAM REPORT: CPT | Performed by: INTERNAL MEDICINE

## 2024-03-25 PROCEDURE — 93005 ELECTROCARDIOGRAM TRACING: CPT | Performed by: INTERNAL MEDICINE

## 2024-03-25 PROCEDURE — 93306 TTE W/DOPPLER COMPLETE: CPT

## 2024-03-25 PROCEDURE — 81003 URINALYSIS AUTO W/O SCOPE: CPT | Performed by: INTERNAL MEDICINE

## 2024-03-25 PROCEDURE — 3075F SYST BP GE 130 - 139MM HG: CPT | Performed by: INTERNAL MEDICINE

## 2024-03-25 PROCEDURE — 1036F TOBACCO NON-USER: CPT | Performed by: INTERNAL MEDICINE

## 2024-03-25 PROCEDURE — 3074F SYST BP LT 130 MM HG: CPT | Performed by: INTERNAL MEDICINE

## 2024-03-25 PROCEDURE — 93306 TTE W/DOPPLER COMPLETE: CPT | Performed by: INTERNAL MEDICINE

## 2024-03-25 NOTE — PATIENT INSTRUCTIONS
It is nice meeting you nephrology clinic today-we discussed the following    # Acute kidney injury in January 2024 most likely due to acute elevation in blood pressure.  Your kidney function back then was 15%.  Now kidney function is recovering and now you are up to 45%.  Will continue to monitor.  Our target is to go back to about 60% which is your baseline    # Hypertension-in excellent control.  Continue amlodipine 5 mg, metoprolol XL 50 mg    # Leg swelling-improved after dropping amlodipine from 10 down to 5 mg.  Continue same.  No need for water pills    # Lethargy and low energy-possibility to valve issues.  Possible valve repair in the near future    Follow-up with me in 6 months or earlier as needed.  Will get blood work and urinalysis done prior to the visit    Leeanna Estrada MD, MS, [facility]   Clinical  - [employer] [employer] [facility] [employer]   Nephrologist - [facility]

## 2024-03-25 NOTE — PROGRESS NOTES
"Subjective       Shanika Diaz is a 83 y.o. female who has past medical history of  hypertension, coronary artery disease, hyperlipidemia, CAD, anxiety, GERD, IBS, diverticular disease status post recent diverticulitis with abscess in the middle of December 2023 who is coming today as an initial office visit for acute kidney injury follow-up post recent hospital admission in January 2024    Shanika came today with her .  She is well-known to me from prior hospital admission in January 2024 when she was admitted with hypertensive emergency complicated with acute kidney injury.  She had normal baseline serum creatinine prior to the admission.  During this admission her serum creatinine peaked up to 3 and GFR went down to 15 mill per minute 1.73 m².  She was managed conservatively without need for dialysis.  Acute kidney injury was attributed to ATN due to hemodynamic injury.  Kidney function remained stable during the admission and patient was discharged with plans to follow-up with me    Today, no kidney related complaints or concerns.  She had leg swelling recently-improved after the dropped amlodipine from 10 mg down to 5 mg.  Blood pressure is accepted.  She has aortic valve stenosis with possible plans for valve replacement in the near future.  She continues to be lethargic with low energy and still recovering after most recent hospital admission.  She is not currently on diuretics or RAAS inhibitors.  Most recent blood work showed improved serum creatinine 1.2 and GFR improved to 45-not back to her baseline yet.  No specific kidney related complaints or concerns today        Objective   /66 (BP Location: Left arm, Patient Position: Standing)   Pulse 74   Temp 36.1 °C (96.9 °F) (Temporal)   Ht 1.626 m (5' 4\")   Wt 81.2 kg (179 lb)   SpO2 99%   BMI 30.73 kg/m²   Wt Readings from Last 3 Encounters:   03/25/24 81.2 kg (179 lb)   03/25/24 83.6 kg (184 lb 3.2 oz)   03/08/24 83 kg (183 lb) " "      Physical Exam    General appearance: no distress awake and alert on room air, no significant hypervolemia on exam  Eyes: non-icteric  HEENT: atrumatic head, PEERLA, moist mucosa  Skin: no apparent rash  Heart: NSR, S1, S2 normal, systolic murmur noticed  Lungs: Symmetrical expansion,CTA bilat no wheezing/crackles  Abdomen: soft, nt/nd, obese  Extremities: no significant edema bilat  Neuro: No FND,asterixis, no focal deficits noticed        Review of Systems     Constitutional lethargy, fatigue, low energy  Eyes: no blurred vision and no diplopia.   ENT: no hearing loss, no earache, no sore throat, no swollen glands in the neck and no nasal discharge.   Cardiovascular occasional leg swelling  Respiratory: no shortness of breath, no chronic cough and no shortness of breath during exertion.   Gastrointestinal: no abdominal pain, no constipation, no heartburn, no vomiting, no bloody stools and no change in bowel movements.   Genitourinary: no dysuria and no hematuria.   Musculoskeletal: no arthralgias and no myalgias.   Skin: no rashes and no skin lesions.   Neurological: no headaches and no dizziness.   Psychiatric: no confusion, no depression and no anxiety.   Endocrine: no heat intolerance, no cold intolerance, appetite not increased, no thyroid disorder, no increased urinary frequency and no dry skin.   Hematologic/Lymphatic: does not bleed easily and does not bruise easily.   All other systems have been reviewed and are negative for complaint.         Data Review                   No results found for: \"URICACID\"        No results found for: \"HGBA1C\"        Results from last 7 days   Lab Units 03/19/24  1056   SODIUM mmol/L 144   POTASSIUM mmol/L 4.9   CHLORIDE mmol/L 107   CO2 mmol/L 29   BUN mg/dL 21   GLUCOSE mg/dL 88   CALCIUM mg/dL 9.9   ANION GAP mmol/L 13   EGFR mL/min/1.73m*2 45*           No results found for: \"MICROALBCREA\"         RFP  Recent Labs     03/19/24  1056 01/17/24  1110 01/10/24  0656 " 01/09/24  0651 01/08/24  1736 12/19/23  0639 12/18/23  0549 12/17/23  0703    146* 143 141 141 139 138 137   K 4.9 4.6 4.0 4.2 4.3 3.6 3.8 3.9    109* 108* 106 102 105 104 104   CO2 29 27 26 25 23 24 25 24   BUN 21 20 30* 27* 30* 7 9 12   CREATININE 1.20* 1.75* 2.73* 2.73* 3.02* 0.95 1.01 0.95   GLUCOSE 88 99 100* 99 94 102* 98 101*   CALCIUM 9.9 9.9 9.4 9.3 9.5 8.8 9.0 8.9   PHOS 4.0 4.0  --   --   --  3.9 4.2 3.5   EGFR 45* 29* 17* 17* 15* 60* 55* 60*   ANIONGAP 13 15 13 14 20 14 13 13        Urineanalysis  Recent Labs     01/08/24  1808 12/16/23  1537 12/16/23  1220 08/07/23  1227   COLORU Yellow Yellow Orange* Yellow   APPEARANCEU Clear Hazy* Clear Clear   SPECGRAVU 1.006 1.014 >=1.030 1.020   THOMAS 6.0 5.0 5.5 6.5   PROTUR NEGATIVE NEGATIVE 30 (1+) NEGATIVE   GLUCOSEU NEGATIVE NEGATIVE NEGATIVE NEGATIVE   BLOODU NEGATIVE NEGATIVE NEGATIVE NEGATIVE   KETONESU NEGATIVE 20 (1+)* NEGATIVE NEGATIVE   BILIRUBINU NEGATIVE NEGATIVE SMALL (1+)* NEGATIVE   NITRITEU NEGATIVE NEGATIVE NEGATIVE NEGATIVE   LEUKOCYTESU TRACE* LARGE (3+)*  --   --        Urine Electrolytes  Recent Labs     01/08/24  1808 12/16/23  1537 12/16/23  1220 08/07/23  1227   SODIUMURR 37  --   --   --    NACREATUR 70  --   --   --    KUR 33  --   --   --    KCREATUR 62  --   --   --    CREATU 53.1  --   --   --    PROTUR NEGATIVE NEGATIVE 30 (1+) NEGATIVE        Urine Micro  Recent Labs     01/08/24  1808 12/16/23  1537 11/22/17  1455   WBCU 1-5 >50* 2   RBCU 1-2 3-5 <1   HYALCASTU  --  1+*  --    SQUAMEPIU  --  1-9 (SPARSE) <1   BACTERIAU  --  1+* 1+*   MUCUSU FEW FEW 1+        Iron  Recent Labs     12/17/23  0703   IRON 35   TIBC 277   IRONSAT 13*   FERRITIN 168*          Current Outpatient Medications on File Prior to Visit   Medication Sig Dispense Refill    amLODIPine (Norvasc) 5 mg tablet Take 1 tablet (5 mg) by mouth once daily. 90 tablet 3    aspirin 81 mg EC tablet Take 1 tablet (81 mg) by mouth once daily. 90 tablet 3     cholecalciferol (Vitamin D-3) 25 MCG (1000 UT) tablet Take 1 tablet (25 mcg) by mouth once daily.      ezetimibe (Zetia) 10 mg tablet Take 1 tablet (10 mg) by mouth once daily at bedtime. 90 tablet 3    GaviLyte-G 236-22.74-6.74 -5.86 gram solution Take 4,000mL by mouth 1 time for 1 dose. Drink 1/2 starting at 6 pm the night prior to procedure and the other 1/2 5 hours prior to the procedure.      incobotulinumtoxinA (Xeomin) 50 Units recon soln Inject into the shoulder, thigh, or buttocks every 3 months.      latanoprost (Xalatan) 0.005 % ophthalmic solution Administer into affected eye(s).      metoprolol succinate XL (Toprol-XL) 50 mg 24 hr tablet Take 1 tablet (50 mg) by mouth once daily. 90 tablet 3    nitroglycerin (Nitrostat) 0.4 mg SL tablet Place 1 tablet (0.4 mg) under the tongue every 5 minutes if needed.      omeprazole (PriLOSEC) 40 mg DR capsule Take 1 capsule (40 mg) by mouth 2 times a day. Do not crush or chew. 60 capsule 1    rosuvastatin (Crestor) 5 mg tablet Take 1 tablet (5 mg) by mouth once daily. 90 tablet 3    triamcinolone (Kenalog) 0.1 % cream Apply twice daily to rash on legs, AS NEEDED for flares      famotidine (Pepcid) 40 mg tablet Take 1 tablet (40 mg) by mouth once daily. Take 30-60 minutes before a meal 30 tablet 3    oxymetazoline (Afrin) 0.05 % nasal spray Administer 2 sprays into each nostril every 12 hours if needed for congestion. Do not use for more than 3 days. (Patient not taking: Reported on 3/25/2024) 30 mL 0     No current facility-administered medications on file prior to visit.           Assessment and Plan       Shanika Diaz  is a 83 y.o. female who has past medical history of hypertension, coronary artery disease, hyperlipidemia, CAD, anxiety, GERD, IBS, diverticular disease status post recent diverticulitis with abscess in the middle of December 2023 who is coming today as an initial office visit for acute kidney injury follow-up post recent hospital admission in  January 2024    Impression    # Nonoliguric acute kidney injury in January 2024.  Serum creatinine peaked 3 GFR less than 15.  Within normal baseline serum creatinine.  Most likely hemodynamic injury in setting of hypertensive emergency with possible ATN.  Urinalysis bland with no blood or protein.  CAT scan without contrast was reviewed and showed within normal kidneys with no obstruction.  Kidney ultrasound Doppler with no significant stenosis elevated Limited exam.  Urine electrolytes are inconclusive.  Kidney function improving with most recent serum creatinine down to 1.2 and GFR improved to 45 mill per minute per 1.73 m² in March 2024-will continue supportive measures and monitor     2-hypertensive urgency with epistaxis with blood pressure above 200/100.  Currently blood pressure is stable and in target.  Current medication amlodipine 5 mg (dropped from 10 due to leg swelling), metoprolol XL 50 mg.  Not on RAAS inhibitors or diuretics.  Unknown cause of hypertensive urgency     3-anemia possibly due to acute kidney injury.  Will check iron storage    4-coronary artery disease-aortic stenosis possible plans for arctic valve replacement    Will follow-up in 6 months with CKD lab prior to the visit.  I will see her as needed earlier    Leeanna Estrada MD, MS, [facility]   Clinical  - [facility] [employer] [employer]   Nephrologist - [facility]

## 2024-03-25 NOTE — PROGRESS NOTES
Subjective:  Shanika returns for a 2-month follow-up.  I was pleased to see she is slowly recuperating following a bout of diverticulitis as well as subsequent SENIA.  She was at [facility] for these hospitalizations.  She still feels somewhat weak and deconditioned but is definitely doing somewhat better.  Her swelling has gone back down with decreasing her amlodipine dosing.  I was pleased to see that her renal function has improved significantly when checked again last week.    She is not having any clear chest discomfort or dyspnea.  She denies any palpitations, presyncope or syncope.  She has not had any recurrent nosebleeds.  She generally is reasonably happy with her progress.  She did have a repeat echocardiogram done today to reassess her aortic stenosis.    Objective:  General: Alert, usual self.  HEENT: Unchanged.  Lungs: Clear without crackles.  Cardiac: Normal S1 and S2 with 2-3/6 systolic murmur.  Abdomen: Nontender.  Extremities: No edema.  Skin: No acute rash.  Neuro: Grossly unchanged.    EKG: Normal sinus rhythm.  Incomplete right bundle branch block.  No acute changes.    Lipid panel: Cholesterol-179, HDL-76, LDL-79, TG-120.    Impression/plan:  Patient is doing reasonably well at this time.  I will review her repeat echo results with her when they are available and make further recommendations as needed.  I would like to see her get stronger before we potentially consider any intervention for her aortic stenosis.    Her heart rate and blood pressure remain under good control at this time.  She remains on appropriate aspirin therapy to protect her LAD stent.  Her lipid panel generally looks quite reasonable on Zetia and a maximally tolerated but low-dose of rosuvastatin.  I will see her back for routine follow-up in 3 months but she knows to call for any intercurrent concerns.  She did not need any prescriptions renewed.    Patient instructions:    Continue current medications unchanged.    We will call  you in the next several days to review your echo results.    Return to clinic in 3 months.

## 2024-03-26 LAB
AORTIC VALVE MEAN GRADIENT: 17.4 MMHG
AORTIC VALVE PEAK VELOCITY: 3.31 M/S
ATRIAL RATE: 63 BPM
AV PEAK GRADIENT: 43.8 MMHG
AVA (PEAK VEL): 0.93 CM2
AVA (VTI): 0.95 CM2
EJECTION FRACTION APICAL 4 CHAMBER: 66.8
EJECTION FRACTION: 72 %
LEFT ATRIUM VOLUME AREA LENGTH INDEX BSA: 24 ML/M2
LEFT VENTRICLE INTERNAL DIMENSION DIASTOLE: 4.05 CM (ref 3.5–6)
LEFT VENTRICULAR OUTFLOW TRACT DIAMETER: 1.78 CM
MITRAL VALVE E/A RATIO: 0.77
MITRAL VALVE E/E' RATIO: 25.81
P AXIS: 69 DEGREES
P OFFSET: 185 MS
P ONSET: 130 MS
POC APPEARANCE, URINE: CLEAR
POC BILIRUBIN, URINE: NEGATIVE
POC BLOOD, URINE: NEGATIVE
POC COLOR, URINE: YELLOW
POC GLUCOSE, URINE: NEGATIVE MG/DL
POC KETONES, URINE: NEGATIVE MG/DL
POC LEUKOCYTES, URINE: NEGATIVE
POC NITRITE,URINE: NEGATIVE
POC PH, URINE: 6 PH
POC PROTEIN, URINE: NEGATIVE MG/DL
POC SPECIFIC GRAVITY, URINE: 1.01
POC UROBILINOGEN, URINE: 0.2 EU/DL
PR INTERVAL: 188 MS
Q ONSET: 224 MS
QRS COUNT: 10 BEATS
QRS DURATION: 92 MS
QT INTERVAL: 410 MS
QTC CALCULATION(BAZETT): 419 MS
QTC FREDERICIA: 416 MS
R AXIS: 53 DEGREES
RIGHT VENTRICLE FREE WALL PEAK S': 13.37 CM/S
RIGHT VENTRICLE PEAK SYSTOLIC PRESSURE: 26.6 MMHG
T AXIS: 65 DEGREES
T OFFSET: 429 MS
TRICUSPID ANNULAR PLANE SYSTOLIC EXCURSION: 2.3 CM
VENTRICULAR RATE: 63 BPM

## 2024-04-02 ENCOUNTER — TELEPHONE (OUTPATIENT)
Dept: CARDIOLOGY | Facility: CLINIC | Age: 84
End: 2024-04-02
Payer: MEDICARE

## 2024-04-05 ENCOUNTER — APPOINTMENT (OUTPATIENT)
Dept: PRIMARY CARE | Facility: CLINIC | Age: 84
End: 2024-04-05
Payer: MEDICARE

## 2024-04-08 ENCOUNTER — APPOINTMENT (OUTPATIENT)
Dept: GASTROENTEROLOGY | Facility: CLINIC | Age: 84
End: 2024-04-08
Payer: MEDICARE

## 2024-04-09 PROBLEM — I87.2 VENOUS STASIS DERMATITIS: Status: ACTIVE | Noted: 2024-04-09

## 2024-04-10 ENCOUNTER — OFFICE VISIT (OUTPATIENT)
Dept: PRIMARY CARE | Facility: CLINIC | Age: 84
End: 2024-04-10
Payer: MEDICARE

## 2024-04-10 ENCOUNTER — APPOINTMENT (OUTPATIENT)
Dept: GASTROENTEROLOGY | Facility: CLINIC | Age: 84
End: 2024-04-10
Payer: MEDICARE

## 2024-04-10 VITALS
OXYGEN SATURATION: 97 % | HEIGHT: 64 IN | SYSTOLIC BLOOD PRESSURE: 128 MMHG | HEART RATE: 65 BPM | BODY MASS INDEX: 31.82 KG/M2 | DIASTOLIC BLOOD PRESSURE: 68 MMHG | WEIGHT: 186.4 LBS

## 2024-04-10 DIAGNOSIS — E78.2 COMBINED HYPERLIPIDEMIA: ICD-10-CM

## 2024-04-10 DIAGNOSIS — G47.33 OSA (OBSTRUCTIVE SLEEP APNEA): ICD-10-CM

## 2024-04-10 DIAGNOSIS — I20.9 ANGINA PECTORIS (CMS-HCC): Primary | ICD-10-CM

## 2024-04-10 DIAGNOSIS — I10 BENIGN ESSENTIAL HYPERTENSION: ICD-10-CM

## 2024-04-10 PROCEDURE — 1036F TOBACCO NON-USER: CPT | Performed by: FAMILY MEDICINE

## 2024-04-10 PROCEDURE — 1160F RVW MEDS BY RX/DR IN RCRD: CPT | Performed by: FAMILY MEDICINE

## 2024-04-10 PROCEDURE — 3074F SYST BP LT 130 MM HG: CPT | Performed by: FAMILY MEDICINE

## 2024-04-10 PROCEDURE — 1123F ACP DISCUSS/DSCN MKR DOCD: CPT | Performed by: FAMILY MEDICINE

## 2024-04-10 PROCEDURE — 99214 OFFICE O/P EST MOD 30 MIN: CPT | Performed by: FAMILY MEDICINE

## 2024-04-10 PROCEDURE — 1159F MED LIST DOCD IN RCRD: CPT | Performed by: FAMILY MEDICINE

## 2024-04-10 PROCEDURE — 3078F DIAST BP <80 MM HG: CPT | Performed by: FAMILY MEDICINE

## 2024-04-10 ASSESSMENT — COLUMBIA-SUICIDE SEVERITY RATING SCALE - C-SSRS
2. HAVE YOU ACTUALLY HAD ANY THOUGHTS OF KILLING YOURSELF?: NO
6. HAVE YOU EVER DONE ANYTHING, STARTED TO DO ANYTHING, OR PREPARED TO DO ANYTHING TO END YOUR LIFE?: NO
1. IN THE PAST MONTH, HAVE YOU WISHED YOU WERE DEAD OR WISHED YOU COULD GO TO SLEEP AND NOT WAKE UP?: NO

## 2024-04-10 ASSESSMENT — PATIENT HEALTH QUESTIONNAIRE - PHQ9
1. LITTLE INTEREST OR PLEASURE IN DOING THINGS: NOT AT ALL
SUM OF ALL RESPONSES TO PHQ9 QUESTIONS 1 AND 2: 0
2. FEELING DOWN, DEPRESSED OR HOPELESS: NOT AT ALL

## 2024-04-10 ASSESSMENT — ENCOUNTER SYMPTOMS
NAUSEA: 0
APPETITE CHANGE: 0
UNEXPECTED WEIGHT CHANGE: 0

## 2024-04-10 NOTE — PROGRESS NOTES
"Subjective   Patient ID: Shanika Diaz is a 83 y.o. female who presents for Follow-up (3 month ).    HPI   Patient has hx of stable hypertension, hyperlipidemia.  Pt denies chest pain, shortness of breath and edema.  Patient's current treatment as listed in Rx.  Patient is compliant with treatment and complains of no side effects associated treatment.  Leg swelling down  C/o fatigue  Trouble sleeping after first couple of hours  Has not had sleep study completed yet    Review of Systems   Constitutional:  Negative for appetite change and unexpected weight change.   Eyes:  Negative for visual disturbance.   Gastrointestinal:  Negative for nausea.       Objective   /68   Pulse 65   Ht 1.626 m (5' 4\")   Wt 84.6 kg (186 lb 6.4 oz)   SpO2 97%   BMI 32.00 kg/m²     Physical Exam  HENT:      Head: Normocephalic and atraumatic.      Nose: Nose normal.      Mouth/Throat:      Mouth: Mucous membranes are moist.      Pharynx: No oropharyngeal exudate.   Eyes:      Extraocular Movements: Extraocular movements intact.      Conjunctiva/sclera: Conjunctivae normal.      Pupils: Pupils are equal, round, and reactive to light.   Cardiovascular:      Rate and Rhythm: Normal rate and regular rhythm.   Pulmonary:      Effort: Pulmonary effort is normal.   Abdominal:      General: There is no distension.      Palpations: Abdomen is soft.   Musculoskeletal:      Cervical back: Normal range of motion and neck supple.   Lymphadenopathy:      Cervical: No cervical adenopathy.   Neurological:      General: No focal deficit present.      Mental Status: She is alert.   Psychiatric:         Attention and Perception: Attention normal.         Speech: Speech normal.         Behavior: Behavior is cooperative.     Leg swelling not present today    Assessment/Plan   Diagnoses and all orders for this visit:  Angina pectoris (CMS/HCC)  Comments:  Not currently active  Benign essential hypertension  Comments:  Treated and " controlled  Combined hyperlipidemia  Comments:  Continue statin and Zetia  OTTO (obstructive sleep apnea)  -     Home sleep apnea test (HSAT); Future  Reviewed labs   present  Recheck 4 to 5 months sooner if any issues arise

## 2024-04-12 ENCOUNTER — OFFICE VISIT (OUTPATIENT)
Dept: GASTROENTEROLOGY | Facility: CLINIC | Age: 84
End: 2024-04-12
Payer: MEDICARE

## 2024-04-12 VITALS — WEIGHT: 182 LBS | HEIGHT: 64 IN | BODY MASS INDEX: 31.07 KG/M2 | HEART RATE: 76 BPM

## 2024-04-12 DIAGNOSIS — K59.09 CHRONIC CONSTIPATION: ICD-10-CM

## 2024-04-12 DIAGNOSIS — K21.00 GASTROESOPHAGEAL REFLUX DISEASE WITH ESOPHAGITIS WITHOUT HEMORRHAGE: Primary | ICD-10-CM

## 2024-04-12 PROCEDURE — 1160F RVW MEDS BY RX/DR IN RCRD: CPT | Performed by: INTERNAL MEDICINE

## 2024-04-12 PROCEDURE — 1123F ACP DISCUSS/DSCN MKR DOCD: CPT | Performed by: INTERNAL MEDICINE

## 2024-04-12 PROCEDURE — 99214 OFFICE O/P EST MOD 30 MIN: CPT | Performed by: INTERNAL MEDICINE

## 2024-04-12 PROCEDURE — 1159F MED LIST DOCD IN RCRD: CPT | Performed by: INTERNAL MEDICINE

## 2024-04-12 NOTE — PROGRESS NOTES
REASON FOR VISIT: Follow-up after endoscopy    HPI:  Shanika Diaz is a 83 y.o. female with a past medical history of reflux esophagitis and chronic constipation being evaluated in the office for follow-up.  Recent upper endoscopy with grade B esophagitis.  Reports that she has not taking omeprazole as she was not sure when to take it.  Will have evening heartburn symptoms before bed.  No current dysphagia.  Constipation which she has having trouble with.  If she takes MiraLAX daily will develop diarrhea.  If she takes MiraLAX every 2 to 3 days seems to have ongoing constipation.  No rectal bleeding at this time.          PRIOR ENDOSCOPY    PAST MEDICAL HISTORY  Past Medical History:   Diagnosis Date    Acute facial pain 03/08/2024    Atherosclerotic heart disease of native coronary artery without angina pectoris     Coronary artery disease without angina pectoris, unspecified vessel or lesion type, unspecified whether native or transplanted heart    Diverticulitis 03/20/2023    Dyspnea on exertion 03/08/2024    Gastritis 03/08/2024    Headache 03/08/2024    Heel pain 03/08/2024    Hypertension     Left foot pain 03/08/2024    Neck pain 03/08/2024    Otitis media 03/08/2024    Personal history of other diseases of the respiratory system 02/02/2019    History of sore throat    Personal history of other specified conditions     History of chest pain    Rash 03/08/2024    Shoulder pain 03/08/2024    Sprain of shoulder 03/08/2024    Urinary tract infection 03/08/2024       PAST SURGICAL HISTORY  Past Surgical History:   Procedure Laterality Date    CARDIAC SURGERY      CATARACT EXTRACTION  09/15/2014    Cataract Surgery    CORONARY ANGIOPLASTY WITH STENT PLACEMENT  10/04/2016    LAD    OTHER SURGICAL HISTORY  09/15/2014    External Auditory Canal Surgery    OTHER SURGICAL HISTORY  12/12/2016    Cath Stent Placement Left Anterior Descending Artery    TONSILLECTOMY  09/15/2014    Tonsillectomy       FAMILY  HISTORY  Family History   Problem Relation Name Age of Onset    Arthritis Mother      Hyperlipidemia Mother      Cerebral aneurysm Father      Atrial fibrillation Brother          Chronic    Other (Cardiac disorder) Brother      Hypertension Brother      Lymphoma Brother         SOCIAL HISTORY  Social History     Tobacco Use    Smoking status: Never    Smokeless tobacco: Never   Substance Use Topics    Alcohol use: Not Currently     Alcohol/week: 7.0 standard drinks of alcohol     Types: 7 Cans of beer per week       REVIEW OF SYSTEMS  CONSTITUTIONAL: negative for fever, chills, fatigue, or unintentional weight loss,   HEENT: negative for icteric sclera, eye pain/redness, or changes in vision/hearing  RESPIRATORY: negative for cough, hemoptysis, wheezing, orthopnea, or dyspnea on exertion  CARDIOVASCULAR: negative for chest pain, palpitations, or syncope   GASTROINTESTINAL: as noted per HPI  GENITOURINARY: negative for dysuria, polyuria, incontinence, or hematuria  MUSCULOSKELETAL: negative for arthralgia, myalgia, or joint swelling/stiffness   INTEGUMENTARY/SKIN: negative jaundice, rash, or skin lesion  HEMATOLOGIC/LYMPHATIC: negative for prolonged bleeding, easy bruising, or swollen lymph nodes  ENDOCRINE: negative for cold/heat intolerance, polydipsia, polyuria, or goiter  NEUROLOGIC: negative for headaches, dizziness, tremor, or gait abnormality  PSYCHIATRIC: negative for anxiety, depression, personality changes, or sleep disturbances      A 10 point review of systems was completed and was otherwise negative.    ALLERGIES  Allergies   Allergen Reactions    Sulfa (Sulfonamide Antibiotics) Swelling, Rash and Angioedema    Codeine Hives    Penicillin Hives     Sever reaction ended up in ED       MEDICATIONS  Current Outpatient Medications   Medication Sig Dispense Refill    amLODIPine (Norvasc) 5 mg tablet Take 1 tablet (5 mg) by mouth once daily. 90 tablet 3    aspirin 81 mg EC tablet Take 1 tablet (81 mg) by  "mouth once daily. 90 tablet 3    cholecalciferol (Vitamin D-3) 25 MCG (1000 UT) tablet Take 1 tablet (25 mcg) by mouth once daily.      ezetimibe (Zetia) 10 mg tablet Take 1 tablet (10 mg) by mouth once daily at bedtime. 90 tablet 3    incobotulinumtoxinA (Xeomin) 50 Units recon soln Inject into the shoulder, thigh, or buttocks every 3 months.      latanoprost (Xalatan) 0.005 % ophthalmic solution Administer into affected eye(s).      metoprolol succinate XL (Toprol-XL) 50 mg 24 hr tablet Take 1 tablet (50 mg) by mouth once daily. 90 tablet 3    nitroglycerin (Nitrostat) 0.4 mg SL tablet Place 1 tablet (0.4 mg) under the tongue every 5 minutes if needed.      omeprazole (PriLOSEC) 40 mg DR capsule Take 1 capsule (40 mg) by mouth 2 times a day. Do not crush or chew. 60 capsule 1    rosuvastatin (Crestor) 5 mg tablet Take 1 tablet (5 mg) by mouth once daily. 90 tablet 3    famotidine (Pepcid) 40 mg tablet Take 1 tablet (40 mg) by mouth once daily. Take 30-60 minutes before a meal 30 tablet 3    GaviLyte-G 236-22.74-6.74 -5.86 gram solution Take 4,000mL by mouth 1 time for 1 dose. Drink 1/2 starting at 6 pm the night prior to procedure and the other 1/2 5 hours prior to the procedure.      oxymetazoline (Afrin) 0.05 % nasal spray Administer 2 sprays into each nostril every 12 hours if needed for congestion. Do not use for more than 3 days. (Patient not taking: Reported on 4/12/2024) 30 mL 0    triamcinolone (Kenalog) 0.1 % cream Apply twice daily to rash on legs, AS NEEDED for flares       No current facility-administered medications for this visit.       VITALS  Pulse 76   Ht 1.626 m (5' 4\")   Wt 82.6 kg (182 lb)   BMI 31.24 kg/m²      PHYSICAL EXAM  Alert and oriented in no acute distress    ASSESSMENT/ PLAN  Patient with reflux esophagitis as well as chronic constipation.  I suggested going on omeprazole for an hour before last meal of the day and she is in agreement with the plan.  Suggested taking half a dose " of MiraLAX daily to see how that works.  She will see me back in the office as needed.  She is in agreement with the plan.  We reviewed GERD lifestyle modifications including staying upright 2 hours after meals as well as weight loss.        Signature: Gregor Henriquez MD    Date: 4/12/2024  Time: 2:20 PM

## 2024-04-21 ENCOUNTER — CLINICAL SUPPORT (OUTPATIENT)
Dept: SLEEP MEDICINE | Facility: HOSPITAL | Age: 84
End: 2024-04-21
Payer: MEDICARE

## 2024-04-21 DIAGNOSIS — G47.33 OSA (OBSTRUCTIVE SLEEP APNEA): ICD-10-CM

## 2024-04-21 PROCEDURE — 95806 SLEEP STUDY UNATT&RESP EFFT: CPT | Performed by: GENERAL PRACTICE

## 2024-04-24 ENCOUNTER — TELEPHONE (OUTPATIENT)
Dept: PRIMARY CARE | Facility: CLINIC | Age: 84
End: 2024-04-24
Payer: MEDICARE

## 2024-04-24 NOTE — TELEPHONE ENCOUNTER
Spoke with pt. States her Amlodipine was increased from 2.5 mg to 5 mg every day and the Metoprolol was increased from 25 mg to 50 mg back in March states she started to have pains in her legs hips and knees over the last few weeks, but worse in the last few nights she could hardly sleep because of the pain. Doesn't know what to do.  Also wanted you to know she sent the Sleep Study machine back on Monday. Should have results soon.

## 2024-04-24 NOTE — TELEPHONE ENCOUNTER
Pt called in stating about 3-4 days after her heart medication dose was increased her joints began to hurt. Pt states it is getting worse and is keeping her up at night. Pt asking if it could be related to the med change and what she should do. Please advise

## 2024-05-08 ENCOUNTER — TELEPHONE (OUTPATIENT)
Dept: PRIMARY CARE | Facility: CLINIC | Age: 84
End: 2024-05-08
Payer: MEDICARE

## 2024-05-08 DIAGNOSIS — G47.33 OSA (OBSTRUCTIVE SLEEP APNEA): ICD-10-CM

## 2024-05-08 NOTE — TELEPHONE ENCOUNTER
----- Message from Rodríguez Beth MD sent at 5/8/2024  4:38 PM EDT -----  Does have severe sleep apnea  And she should be on a CPAP  Please order AutoPap as per results

## 2024-06-24 ENCOUNTER — OFFICE VISIT (OUTPATIENT)
Dept: CARDIOLOGY | Facility: CLINIC | Age: 84
End: 2024-06-24
Payer: MEDICARE

## 2024-06-24 VITALS
HEART RATE: 70 BPM | BODY MASS INDEX: 32.58 KG/M2 | DIASTOLIC BLOOD PRESSURE: 72 MMHG | OXYGEN SATURATION: 99 % | WEIGHT: 189.8 LBS | SYSTOLIC BLOOD PRESSURE: 126 MMHG

## 2024-06-24 DIAGNOSIS — I10 BENIGN ESSENTIAL HYPERTENSION: ICD-10-CM

## 2024-06-24 DIAGNOSIS — I35.0 NONRHEUMATIC AORTIC VALVE STENOSIS: Primary | ICD-10-CM

## 2024-06-24 DIAGNOSIS — I25.10 CORONARY ARTERY DISEASE INVOLVING NATIVE CORONARY ARTERY OF NATIVE HEART WITHOUT ANGINA PECTORIS: ICD-10-CM

## 2024-06-24 PROCEDURE — 1160F RVW MEDS BY RX/DR IN RCRD: CPT | Performed by: NURSE PRACTITIONER

## 2024-06-24 PROCEDURE — 1123F ACP DISCUSS/DSCN MKR DOCD: CPT | Performed by: NURSE PRACTITIONER

## 2024-06-24 PROCEDURE — 99214 OFFICE O/P EST MOD 30 MIN: CPT | Performed by: NURSE PRACTITIONER

## 2024-06-24 PROCEDURE — 1126F AMNT PAIN NOTED NONE PRSNT: CPT | Performed by: NURSE PRACTITIONER

## 2024-06-24 PROCEDURE — 3078F DIAST BP <80 MM HG: CPT | Performed by: NURSE PRACTITIONER

## 2024-06-24 PROCEDURE — 3074F SYST BP LT 130 MM HG: CPT | Performed by: NURSE PRACTITIONER

## 2024-06-24 PROCEDURE — 1036F TOBACCO NON-USER: CPT | Performed by: NURSE PRACTITIONER

## 2024-06-24 PROCEDURE — 1159F MED LIST DOCD IN RCRD: CPT | Performed by: NURSE PRACTITIONER

## 2024-06-24 ASSESSMENT — ENCOUNTER SYMPTOMS
OCCASIONAL FEELINGS OF UNSTEADINESS: 0
LOSS OF SENSATION IN FEET: 0
DEPRESSION: 0

## 2024-06-24 ASSESSMENT — PAIN SCALES - GENERAL: PAINLEVEL: 0-NO PAIN

## 2024-06-24 NOTE — PROGRESS NOTES
Subjective   Shanika Diaz is a 83 y.o. female.    Chief Complaint:  Follow-up, Hypertension, and Coronary Artery Disease    Mrs. Diaz returns for a routine 3 month follow up. Since her last visit with us, her PCP decreased amlodipine due to swelling, and increased metoprolol. She is now feeling more fatigued and short of breath on the higher dose of metoprolol. She seems to be getting around reasonably well, denying any exertional chest pain. She offers no other cardiovascular complaints or concerns today. She denies any complaints of chest pain, lightheadedness, dizziness, palpitations, syncope, orthopnea, paroxysmal nocturnal dyspnea, lower extremity swelling or bleeding concerns.          Review of Systems   All other systems reviewed and are negative.      Objective   Physical Exam  Constitutional:       Appearance: Healthy appearance. In no distress  Pulmonary:      Effort: Pulmonary effort is normal.      Breath sounds: Normal breath sounds.   Cardiovascular:      Normal rate. Regular rhythm. Normal S1. Normal S2.       Murmurs: There is 3/6 systolic murmur.      Carotids: right carotid pulse +2, no bruit heard over the right carotid. left carotid pulse +2, no bruit heard over the left carotid.  Edema:     Peripheral edema absent.   Abdominal:      Palpations: Abdomen is soft.   Musculoskeletal:       Cervical back: Normal range of motion.   Skin:     General: Skin is warm and dry. Normal color and pigmentation   Neurological:      Mental Status: Alert and oriented to person, place and time.   Psychiatric:     Mood and Affect: appropriate mood and appropriate affect.       Lab Review:   Lab Results   Component Value Date     03/19/2024    K 4.9 03/19/2024     03/19/2024    CO2 29 03/19/2024    BUN 21 03/19/2024    CREATININE 1.20 (H) 03/19/2024    GLUCOSE 88 03/19/2024    CALCIUM 9.9 03/19/2024     Lab Results   Component Value Date    WBC 5.7 01/10/2024    HGB 11.2 (L) 01/10/2024    HCT  35.4 (L) 01/10/2024    MCV 91 01/10/2024     01/10/2024     Lab Results   Component Value Date    CHOL 179 10/26/2022    TRIG 120 10/26/2022    HDL 75.9 10/26/2022       Assessment/Plan   Mrs. Diaz is a pleasant 83 year old  female with a past medical history significant for hypertension, hyperlipidemia and coronary artery disease. Heart catheterization 1/2023 showed a widely patent mid LAD stent with moderate AS. Echocardiogram 3/2024 showed an EF of 65% with trace MR and moderate aortic stenosis with a peak gradient of 43.8, mean 17.4. She presents today for routine follow up stable from a cardiac standpoint. Her VS remain stable, and since being on the higher dose of metoprolol, she is more fatigued. I will have her go back to taking 25 mg daily. She will start monitoring her BP at home, and knows to call for any consistently elevated readings. She will follow up with us in clinic in 2 months. She knows to call for any concerns.

## 2024-07-08 ENCOUNTER — OFFICE VISIT (OUTPATIENT)
Dept: PRIMARY CARE | Facility: CLINIC | Age: 84
End: 2024-07-08
Payer: MEDICARE

## 2024-07-08 VITALS
DIASTOLIC BLOOD PRESSURE: 66 MMHG | SYSTOLIC BLOOD PRESSURE: 128 MMHG | TEMPERATURE: 97.9 F | HEART RATE: 78 BPM | OXYGEN SATURATION: 98 %

## 2024-07-08 DIAGNOSIS — R10.30 LOWER ABDOMINAL PAIN: ICD-10-CM

## 2024-07-08 DIAGNOSIS — I10 BENIGN ESSENTIAL HYPERTENSION: ICD-10-CM

## 2024-07-08 DIAGNOSIS — K57.32 DIVERTICULITIS OF LARGE INTESTINE WITHOUT PERFORATION OR ABSCESS WITHOUT BLEEDING: Primary | ICD-10-CM

## 2024-07-08 LAB
POC APPEARANCE, URINE: CLEAR
POC BILIRUBIN, URINE: NEGATIVE
POC BLOOD, URINE: ABNORMAL
POC COLOR, URINE: YELLOW
POC GLUCOSE, URINE: NEGATIVE MG/DL
POC KETONES, URINE: NEGATIVE MG/DL
POC LEUKOCYTES, URINE: NEGATIVE
POC NITRITE,URINE: NEGATIVE
POC PH, URINE: 6.5 PH
POC PROTEIN, URINE: NEGATIVE MG/DL
POC SPECIFIC GRAVITY, URINE: 1.02
POC UROBILINOGEN, URINE: 0.2 EU/DL

## 2024-07-08 PROCEDURE — 1159F MED LIST DOCD IN RCRD: CPT | Performed by: FAMILY MEDICINE

## 2024-07-08 PROCEDURE — 3078F DIAST BP <80 MM HG: CPT | Performed by: FAMILY MEDICINE

## 2024-07-08 PROCEDURE — 99213 OFFICE O/P EST LOW 20 MIN: CPT | Performed by: FAMILY MEDICINE

## 2024-07-08 PROCEDURE — 1160F RVW MEDS BY RX/DR IN RCRD: CPT | Performed by: FAMILY MEDICINE

## 2024-07-08 PROCEDURE — 3074F SYST BP LT 130 MM HG: CPT | Performed by: FAMILY MEDICINE

## 2024-07-08 PROCEDURE — 81003 URINALYSIS AUTO W/O SCOPE: CPT | Performed by: FAMILY MEDICINE

## 2024-07-08 PROCEDURE — 1123F ACP DISCUSS/DSCN MKR DOCD: CPT | Performed by: FAMILY MEDICINE

## 2024-07-08 PROCEDURE — 1036F TOBACCO NON-USER: CPT | Performed by: FAMILY MEDICINE

## 2024-07-08 RX ORDER — CIPROFLOXACIN 500 MG/1
500 TABLET ORAL 2 TIMES DAILY
Qty: 20 TABLET | Refills: 0 | Status: SHIPPED | OUTPATIENT
Start: 2024-07-08 | End: 2024-07-18

## 2024-07-08 ASSESSMENT — ENCOUNTER SYMPTOMS
ABDOMINAL PAIN: 1
APPETITE CHANGE: 0
NAUSEA: 0
UNEXPECTED WEIGHT CHANGE: 0

## 2024-07-08 NOTE — PROGRESS NOTES
Subjective   Patient ID: Shanika Diaz is a 83 y.o. female who presents for Abdominal Pain (L lower abdomen into central lower abdomen, onset 1 week ago, possible UTI? Bowels are moving OK denies fever).    Abdominal Pain  Pertinent negatives include no nausea.   Patient says it feels like the diverticulitis she had last time    Review of Systems   Constitutional:  Negative for appetite change and unexpected weight change.   Eyes:  Negative for visual disturbance.   Gastrointestinal:  Positive for abdominal pain. Negative for nausea.       Objective   /66   Pulse 78   Temp 36.6 °C (97.9 °F)   SpO2 98%     Physical Exam  HENT:      Head: Normocephalic and atraumatic.      Nose: Nose normal.      Mouth/Throat:      Mouth: Mucous membranes are moist.      Pharynx: No oropharyngeal exudate.   Eyes:      Extraocular Movements: Extraocular movements intact.      Conjunctiva/sclera: Conjunctivae normal.      Pupils: Pupils are equal, round, and reactive to light.   Cardiovascular:      Rate and Rhythm: Normal rate and regular rhythm.   Pulmonary:      Effort: Pulmonary effort is normal.   Abdominal:      General: There is no distension.      Palpations: Abdomen is soft.   Musculoskeletal:      Cervical back: Normal range of motion and neck supple.   Lymphadenopathy:      Cervical: No cervical adenopathy.   Neurological:      General: No focal deficit present.      Mental Status: She is alert.   Psychiatric:         Attention and Perception: Attention normal.         Speech: Speech normal.         Behavior: Behavior is cooperative.         Assessment/Plan   Problem List Items Addressed This Visit             ICD-10-CM    Benign essential hypertension I10     Treated and controlled          Other Visit Diagnoses         Codes    Diverticulitis of large intestine without perforation or abscess without bleeding    -  Primary K57.32    Discussed diet and recommend starting fiber supplement once diverticulitis pain  resolves  Risk benefits discussed add medication     Relevant Medications    ciprofloxacin (Cipro) 500 mg tablet    Lower abdominal pain     R10.30    Relevant Medications    ciprofloxacin (Cipro) 500 mg tablet    Other Relevant Orders    POCT UA Automated manually resulted (Completed)           is present  Reviewed labs  Recheck 1 week if not better sooner if worse

## 2024-08-14 ENCOUNTER — APPOINTMENT (OUTPATIENT)
Dept: PRIMARY CARE | Facility: CLINIC | Age: 84
End: 2024-08-14
Payer: MEDICARE

## 2024-08-14 VITALS
OXYGEN SATURATION: 99 % | SYSTOLIC BLOOD PRESSURE: 124 MMHG | WEIGHT: 191.2 LBS | HEIGHT: 64 IN | BODY MASS INDEX: 32.64 KG/M2 | HEART RATE: 65 BPM | DIASTOLIC BLOOD PRESSURE: 60 MMHG

## 2024-08-14 DIAGNOSIS — K59.09 CHRONIC CONSTIPATION: ICD-10-CM

## 2024-08-14 DIAGNOSIS — F41.1 GENERALIZED ANXIETY DISORDER: ICD-10-CM

## 2024-08-14 DIAGNOSIS — I10 BENIGN ESSENTIAL HYPERTENSION: Primary | ICD-10-CM

## 2024-08-14 DIAGNOSIS — G47.33 OSA (OBSTRUCTIVE SLEEP APNEA): ICD-10-CM

## 2024-08-14 PROBLEM — I16.1 HYPERTENSIVE EMERGENCY: Status: RESOLVED | Noted: 2024-03-08 | Resolved: 2024-08-14

## 2024-08-14 PROBLEM — N18.4 CKD (CHRONIC KIDNEY DISEASE) STAGE 4, GFR 15-29 ML/MIN (MULTI): Status: ACTIVE | Noted: 2024-08-14

## 2024-08-14 PROCEDURE — 1159F MED LIST DOCD IN RCRD: CPT | Performed by: FAMILY MEDICINE

## 2024-08-14 PROCEDURE — 1036F TOBACCO NON-USER: CPT | Performed by: FAMILY MEDICINE

## 2024-08-14 PROCEDURE — 99214 OFFICE O/P EST MOD 30 MIN: CPT | Performed by: FAMILY MEDICINE

## 2024-08-14 PROCEDURE — 1123F ACP DISCUSS/DSCN MKR DOCD: CPT | Performed by: FAMILY MEDICINE

## 2024-08-14 PROCEDURE — 3078F DIAST BP <80 MM HG: CPT | Performed by: FAMILY MEDICINE

## 2024-08-14 PROCEDURE — 3074F SYST BP LT 130 MM HG: CPT | Performed by: FAMILY MEDICINE

## 2024-08-14 RX ORDER — TRAZODONE HYDROCHLORIDE 50 MG/1
50 TABLET ORAL NIGHTLY PRN
Qty: 30 TABLET | Refills: 0 | Status: SHIPPED | OUTPATIENT
Start: 2024-08-14 | End: 2025-08-14

## 2024-08-14 ASSESSMENT — ENCOUNTER SYMPTOMS
NAUSEA: 0
APPETITE CHANGE: 0
UNEXPECTED WEIGHT CHANGE: 0

## 2024-08-14 NOTE — ASSESSMENT & PLAN NOTE
Patient tolerating CPAP and improved with it and using it properly and consistently  I recommend indefinite ongoing use

## 2024-08-14 NOTE — PROGRESS NOTES
"Subjective   Patient ID: Shanika Diaz is a 83 y.o. female who presents for Follow-up (4 month, CPAP coming up to her 90 days and needs insurance.  Pt is still having a hard time sleeping because of her hip pain, her feet have been swelling especially by the end of the day.  Pt would like a prescription for miralax, would like something different than  what she was prescribed in hospital.  Pt also believes she needs medication for anxiety.   Pt has noticed her nails for cracking and peeling.  ).    HPI   Uses CPAP everynight except Thursday nights  Uses CPAP 6-8.5 hours a night now  Sleeping better  Functioning well during the day    Review of Systems   Constitutional:  Negative for appetite change and unexpected weight change.   Eyes:  Negative for visual disturbance.   Gastrointestinal:  Negative for nausea.       Objective   /60   Pulse 65   Ht 1.626 m (5' 4\")   Wt 86.7 kg (191 lb 3.2 oz)   SpO2 99%   BMI 32.82 kg/m²     Physical Exam  HENT:      Head: Normocephalic and atraumatic.      Nose: Nose normal.      Mouth/Throat:      Mouth: Mucous membranes are moist.      Pharynx: No oropharyngeal exudate.   Eyes:      Extraocular Movements: Extraocular movements intact.      Conjunctiva/sclera: Conjunctivae normal.      Pupils: Pupils are equal, round, and reactive to light.   Cardiovascular:      Rate and Rhythm: Normal rate and regular rhythm.   Pulmonary:      Effort: Pulmonary effort is normal.   Abdominal:      General: There is no distension.      Palpations: Abdomen is soft.   Musculoskeletal:      Cervical back: Normal range of motion and neck supple.   Lymphadenopathy:      Cervical: No cervical adenopathy.   Neurological:      General: No focal deficit present.      Mental Status: She is alert.   Psychiatric:         Attention and Perception: Attention normal.         Speech: Speech normal.         Behavior: Behavior is cooperative.     Some nails do appear cracked " distally    Assessment/Plan   Problem List Items Addressed This Visit             ICD-10-CM    Benign essential hypertension - Primary I10     Treated and controlled         Chronic constipation K59.09    Generalized anxiety disorder F41.1    Relevant Medications    traZODone (Desyrel) 50 mg tablet    OTTO (obstructive sleep apnea) G47.33     Patient tolerating CPAP and improved with it and using it properly and consistently  I recommend indefinite ongoing use          Recommend OTC MiraLAX plus fiber supplement with plenty of fluids daily and stay active   is present  Reviewed labs  Recommend biotin vitamin supplement for nails  Recheck 4 months sooner if any issues arise

## 2024-08-19 ENCOUNTER — OFFICE VISIT (OUTPATIENT)
Dept: CARDIOLOGY | Facility: CLINIC | Age: 84
End: 2024-08-19
Payer: MEDICARE

## 2024-08-19 VITALS
OXYGEN SATURATION: 93 % | WEIGHT: 194 LBS | HEIGHT: 64 IN | DIASTOLIC BLOOD PRESSURE: 73 MMHG | SYSTOLIC BLOOD PRESSURE: 137 MMHG | HEART RATE: 68 BPM | BODY MASS INDEX: 33.12 KG/M2

## 2024-08-19 DIAGNOSIS — I25.10 CORONARY ARTERY DISEASE INVOLVING NATIVE CORONARY ARTERY OF NATIVE HEART WITHOUT ANGINA PECTORIS: ICD-10-CM

## 2024-08-19 DIAGNOSIS — I10 BENIGN ESSENTIAL HYPERTENSION: ICD-10-CM

## 2024-08-19 DIAGNOSIS — I35.0 NONRHEUMATIC AORTIC VALVE STENOSIS: Primary | ICD-10-CM

## 2024-08-19 PROCEDURE — 1125F AMNT PAIN NOTED PAIN PRSNT: CPT | Performed by: NURSE PRACTITIONER

## 2024-08-19 PROCEDURE — 3075F SYST BP GE 130 - 139MM HG: CPT | Performed by: NURSE PRACTITIONER

## 2024-08-19 PROCEDURE — 1123F ACP DISCUSS/DSCN MKR DOCD: CPT | Performed by: NURSE PRACTITIONER

## 2024-08-19 PROCEDURE — 1159F MED LIST DOCD IN RCRD: CPT | Performed by: NURSE PRACTITIONER

## 2024-08-19 PROCEDURE — 99214 OFFICE O/P EST MOD 30 MIN: CPT | Performed by: NURSE PRACTITIONER

## 2024-08-19 PROCEDURE — 3078F DIAST BP <80 MM HG: CPT | Performed by: NURSE PRACTITIONER

## 2024-08-19 PROCEDURE — 1036F TOBACCO NON-USER: CPT | Performed by: NURSE PRACTITIONER

## 2024-08-19 PROCEDURE — 1160F RVW MEDS BY RX/DR IN RCRD: CPT | Performed by: NURSE PRACTITIONER

## 2024-08-19 RX ORDER — METOPROLOL SUCCINATE 50 MG/1
25 TABLET, EXTENDED RELEASE ORAL DAILY
Status: CANCELLED
Start: 2024-08-19

## 2024-08-19 RX ORDER — METOPROLOL SUCCINATE 50 MG/1
25 TABLET, EXTENDED RELEASE ORAL DAILY
Start: 2024-08-19

## 2024-08-19 ASSESSMENT — PAIN SCALES - GENERAL: PAINLEVEL: 7

## 2024-08-19 NOTE — PROGRESS NOTES
Subjective   Shanika Diaz is a 83 y.o. female.    Chief Complaint:  Follow-up, Hypertension, Coronary Artery Disease, and Fatigue    Mrs. Diaz returns for a routine 2 month follow up. She is seen in collaboration with Dr. Espinosa. At her last visit, we decreased metoprolol due to complaints or fatigue. She remains fatigued, though feels this may be slightly better. Her other complaint today is muscle aches and she is also struggling with intermittent sciatica pain. She offers no other cardiovascular complaints or concerns today. She denies any complaints of chest pain, shortness of breath, lightheadedness, dizziness, palpitations, syncope, orthopnea, paroxysmal nocturnal dyspnea, lower extremity swelling or bleeding concerns.          Review of Systems   All other systems reviewed and are negative.      Objective   Physical Exam  Constitutional:       Appearance: Healthy appearance. In no distress  Pulmonary:      Effort: Pulmonary effort is normal.      Breath sounds: Normal breath sounds.   Cardiovascular:      Normal rate. Regular rhythm. Normal S1. Normal S2.       Murmurs: There is 3/6 systolic murmur.      Carotids: right carotid pulse +2, no bruit heard over the right carotid. left carotid pulse +2, no bruit heard over the left carotid.  Edema:     Peripheral edema absent.   Abdominal:      Palpations: Abdomen is soft.   Musculoskeletal:       Cervical back: Normal range of motion.   Skin:     General: Skin is warm and dry. Normal color and pigmentation   Neurological:      Mental Status: Alert and oriented to person, place and time.   Psychiatric:     Mood and Affect: appropriate mood and appropriate affect.     Lab Review:     Lab Results   Component Value Date    WBC 5.7 01/10/2024    HGB 11.2 (L) 01/10/2024    HCT 35.4 (L) 01/10/2024    MCV 91 01/10/2024     01/10/2024     Lab Results   Component Value Date    CHOL 179 10/26/2022    TRIG 120 10/26/2022    HDL 75.9 10/26/2022        Assessment/Plan   Mrs. Diaz is an 83 year old  female with a past medical history significant for hypertension, hyperlipidemia, OTTO and CAD s/p mid LAD PCI and LADD1 PTCA in 2016. Heart catheterization 1/2023 showed a widely patent mid LAD stent with moderate AS. Echocardiogram 3/2024 showed an EF of 65% with trace MR and moderate aortic stenosis with a peak gradient of 43.8, mean 17.4. She presents today for routine follow up stable from a cardiac standpoint. Her VS remain stable. I will have her continue all medications unchanged. She was encouraged to start taking CoQ 10 for her myalgias. She will follow up with us in clinic in March with an echocardiogram the same day. She knows to call for any concerns.

## 2024-09-09 ENCOUNTER — TELEPHONE (OUTPATIENT)
Dept: CARDIOLOGY | Facility: HOSPITAL | Age: 84
End: 2024-09-09
Payer: MEDICARE

## 2024-09-09 DIAGNOSIS — I10 BENIGN ESSENTIAL HYPERTENSION: ICD-10-CM

## 2024-09-09 RX ORDER — METOPROLOL SUCCINATE 50 MG/1
25 TABLET, EXTENDED RELEASE ORAL DAILY
Qty: 90 TABLET | Refills: 0 | Status: CANCELLED | OUTPATIENT
Start: 2024-09-09

## 2024-09-10 ENCOUNTER — TELEPHONE (OUTPATIENT)
Dept: CARDIOLOGY | Facility: CLINIC | Age: 84
End: 2024-09-10
Payer: MEDICARE

## 2024-09-10 DIAGNOSIS — I10 BENIGN ESSENTIAL HYPERTENSION: ICD-10-CM

## 2024-09-10 RX ORDER — METOPROLOL SUCCINATE 25 MG/1
25 TABLET, EXTENDED RELEASE ORAL DAILY
Qty: 90 TABLET | Refills: 3 | Status: SHIPPED | OUTPATIENT
Start: 2024-09-10

## 2024-09-13 ENCOUNTER — TELEPHONE (OUTPATIENT)
Dept: PRIMARY CARE | Facility: CLINIC | Age: 84
End: 2024-09-13
Payer: MEDICARE

## 2024-09-13 DIAGNOSIS — F41.1 GENERALIZED ANXIETY DISORDER: ICD-10-CM

## 2024-09-13 RX ORDER — TRAZODONE HYDROCHLORIDE 50 MG/1
50 TABLET ORAL NIGHTLY PRN
Qty: 90 TABLET | Refills: 0 | Status: SHIPPED | OUTPATIENT
Start: 2024-09-13 | End: 2025-09-13

## 2024-09-13 NOTE — TELEPHONE ENCOUNTER
MEDICATION REFILL    Pharmacy Name:     [facility]   Medication requested               Trazadone   50 mg   Dosage    1 tab prn bedtime for sleep   Quantity   90 days  ( pt would like 90 days if allowed)  Allergies    none given   Date of last visit    08/14/2024  Date of Next Appointment    12/23/2024

## 2024-09-23 ENCOUNTER — LAB (OUTPATIENT)
Dept: LAB | Facility: LAB | Age: 84
End: 2024-09-23
Payer: MEDICARE

## 2024-09-23 DIAGNOSIS — N18.32 CHRONIC KIDNEY DISEASE, STAGE 3B (MULTI): ICD-10-CM

## 2024-09-23 DIAGNOSIS — I10 BENIGN ESSENTIAL HYPERTENSION: ICD-10-CM

## 2024-09-23 DIAGNOSIS — N17.9 ACUTE KIDNEY INJURY (CMS-HCC): ICD-10-CM

## 2024-09-23 LAB
25(OH)D3 SERPL-MCNC: 47 NG/ML (ref 30–100)
ALBUMIN SERPL BCP-MCNC: 4.5 G/DL (ref 3.4–5)
ANION GAP SERPL CALC-SCNC: 15 MMOL/L (ref 10–20)
BUN SERPL-MCNC: 18 MG/DL (ref 6–23)
CALCIUM SERPL-MCNC: 9.8 MG/DL (ref 8.6–10.6)
CHLORIDE SERPL-SCNC: 105 MMOL/L (ref 98–107)
CO2 SERPL-SCNC: 26 MMOL/L (ref 21–32)
CREAT SERPL-MCNC: 1.18 MG/DL (ref 0.5–1.05)
CREAT UR-MCNC: 49.5 MG/DL (ref 20–320)
EGFRCR SERPLBLD CKD-EPI 2021: 46 ML/MIN/1.73M*2
ERYTHROCYTE [DISTWIDTH] IN BLOOD BY AUTOMATED COUNT: 13.4 % (ref 11.5–14.5)
FERRITIN SERPL-MCNC: 54 NG/ML (ref 8–150)
GLUCOSE SERPL-MCNC: 93 MG/DL (ref 74–99)
HCT VFR BLD AUTO: 37.7 % (ref 36–46)
HGB BLD-MCNC: 11.6 G/DL (ref 12–16)
IRON SATN MFR SERPL: 19 % (ref 25–45)
IRON SERPL-MCNC: 83 UG/DL (ref 35–150)
MCH RBC QN AUTO: 28.3 PG (ref 26–34)
MCHC RBC AUTO-ENTMCNC: 30.8 G/DL (ref 32–36)
MCV RBC AUTO: 92 FL (ref 80–100)
MICROALBUMIN UR-MCNC: <7 MG/L
MICROALBUMIN/CREAT UR: NORMAL MG/G{CREAT}
NRBC BLD-RTO: 0 /100 WBCS (ref 0–0)
PHOSPHATE SERPL-MCNC: 4.2 MG/DL (ref 2.5–4.9)
PLATELET # BLD AUTO: 307 X10*3/UL (ref 150–450)
POTASSIUM SERPL-SCNC: 5.1 MMOL/L (ref 3.5–5.3)
PTH-INTACT SERPL-MCNC: 71.5 PG/ML (ref 18.5–88)
RBC # BLD AUTO: 4.1 X10*6/UL (ref 4–5.2)
SODIUM SERPL-SCNC: 141 MMOL/L (ref 136–145)
TIBC SERPL-MCNC: 432 UG/DL (ref 240–445)
UIBC SERPL-MCNC: 349 UG/DL (ref 110–370)
WBC # BLD AUTO: 5.1 X10*3/UL (ref 4.4–11.3)

## 2024-09-23 PROCEDURE — 82728 ASSAY OF FERRITIN: CPT

## 2024-09-23 PROCEDURE — 83550 IRON BINDING TEST: CPT

## 2024-09-23 PROCEDURE — 85027 COMPLETE CBC AUTOMATED: CPT

## 2024-09-23 PROCEDURE — 82570 ASSAY OF URINE CREATININE: CPT

## 2024-09-23 PROCEDURE — 82043 UR ALBUMIN QUANTITATIVE: CPT

## 2024-09-23 PROCEDURE — 83970 ASSAY OF PARATHORMONE: CPT

## 2024-09-23 PROCEDURE — 80069 RENAL FUNCTION PANEL: CPT

## 2024-09-23 PROCEDURE — 82306 VITAMIN D 25 HYDROXY: CPT

## 2024-09-23 PROCEDURE — 36415 COLL VENOUS BLD VENIPUNCTURE: CPT

## 2024-09-23 PROCEDURE — 83540 ASSAY OF IRON: CPT

## 2024-09-27 ENCOUNTER — TELEPHONE (OUTPATIENT)
Dept: PRIMARY CARE | Facility: CLINIC | Age: 84
End: 2024-09-27
Payer: MEDICARE

## 2024-09-27 NOTE — TELEPHONE ENCOUNTER
Pt states that she has blurred vision and is concerned it could be due to the trazodone she is taking. Pt states that is one of the side effects and would like some to call her back and advise her on what to do next.

## 2024-09-30 ENCOUNTER — APPOINTMENT (OUTPATIENT)
Dept: NEPHROLOGY | Facility: CLINIC | Age: 84
End: 2024-09-30
Payer: MEDICARE

## 2024-09-30 VITALS
OXYGEN SATURATION: 99 % | WEIGHT: 195.4 LBS | RESPIRATION RATE: 16 BRPM | TEMPERATURE: 96.9 F | DIASTOLIC BLOOD PRESSURE: 71 MMHG | BODY MASS INDEX: 33.36 KG/M2 | HEART RATE: 62 BPM | SYSTOLIC BLOOD PRESSURE: 141 MMHG | HEIGHT: 64 IN

## 2024-09-30 DIAGNOSIS — I10 BENIGN ESSENTIAL HYPERTENSION: ICD-10-CM

## 2024-09-30 DIAGNOSIS — N17.9 ACUTE KIDNEY INJURY (CMS-HCC): ICD-10-CM

## 2024-09-30 DIAGNOSIS — N18.32 CHRONIC KIDNEY DISEASE, STAGE 3B (MULTI): ICD-10-CM

## 2024-09-30 DIAGNOSIS — N18.31 STAGE 3A CHRONIC KIDNEY DISEASE (MULTI): Primary | ICD-10-CM

## 2024-09-30 PROCEDURE — 1123F ACP DISCUSS/DSCN MKR DOCD: CPT | Performed by: INTERNAL MEDICINE

## 2024-09-30 PROCEDURE — 3078F DIAST BP <80 MM HG: CPT | Performed by: INTERNAL MEDICINE

## 2024-09-30 PROCEDURE — 99215 OFFICE O/P EST HI 40 MIN: CPT | Performed by: INTERNAL MEDICINE

## 2024-09-30 PROCEDURE — 1159F MED LIST DOCD IN RCRD: CPT | Performed by: INTERNAL MEDICINE

## 2024-09-30 PROCEDURE — 3077F SYST BP >= 140 MM HG: CPT | Performed by: INTERNAL MEDICINE

## 2024-09-30 NOTE — PATIENT INSTRUCTIONS
It is nice meeting you nephrology clinic today-we discussed the following    # Acute kidney injury in January 2024 most likely due to acute elevation in blood pressure.  Your kidney function back then was 15%.  Now kidney function is recovering and now you are up to 45%--46%.  Will continue to monitor.      # Hypertension-in excellent control.  Continue amlodipine 5 mg, metoprolol XL 25 mg    # Leg swelling-improved after dropping amlodipine from 10 down to 5 mg.  Continue same.  No need for water pills    # Lethargy and low energy with new dizziness and blurry vision-possibility to aortic valve narrowing.  Discussed with cardiology if not improving    Follow-up with me in 12 months or earlier as needed.  Will get blood work and urinalysis done prior to the visit    Leeanna Estrada MD, MS, [facility]   Clinical  - [employer] [facility] [employer]   Nephrologist - [facility] - [facility]

## 2024-09-30 NOTE — PROGRESS NOTES
Subjective       Shanika Diaz is a 84 y.o. female who has past medical history of  hypertension, coronary artery disease, hyperlipidemia, CAD, anxiety, GERD, IBS, diverticular disease status post recent diverticulitis with abscess in the middle of December 2023 who is coming today as chronic kidney disease follow-up    Last office visit March 2024.  Shanika came today with her .  Recently she felt dizzy and lightheaded with blurry vision-trazodone was held per PCP for the last 48 hours.  She continues to follow with cardiology for atrial valve stenosis with plan to repeat echo in the next few months.  She had repeat blood work done in September 23 2024 that showed improving serum creatinine 1.1 and GFR 46 and within normal electrolytes including sodium and potassium.  No significant acidosis.  No significant albuminuria-negative spot ACR.  Within normal PTH and vitamin D.  With normal calcium and albumin.  No significant anemia hemoglobin 11.6 and within normal iron storage with ferritin 54.  No kidney related complaints or concerns today.  No leg swelling or shortness of breath.  She gained 15 pounds since last office visit-mainly flares again.  No significant hypervolemia exam today    Prior notes    Shanika came today with her .  She is well-known to me from prior hospital admission in January 2024 when she was admitted with hypertensive emergency complicated with acute kidney injury.  She had normal baseline serum creatinine prior to the admission.  During this admission her serum creatinine peaked up to 3 and GFR went down to 15 mill per minute 1.73 m².  She was managed conservatively without need for dialysis.  Acute kidney injury was attributed to ATN due to hemodynamic injury.  Kidney function remained stable during the admission and patient was discharged with plans to follow-up with me    Today, no kidney related complaints or concerns.  She had leg swelling recently-improved after the  "dropped amlodipine from 10 mg down to 5 mg.  Blood pressure is accepted.  She has aortic valve stenosis with possible plans for valve replacement in the near future.  She continues to be lethargic with low energy and still recovering after most recent hospital admission.  She is not currently on diuretics or RAAS inhibitors.  Most recent blood work showed improved serum creatinine 1.2 and GFR improved to 45-not back to her baseline yet.  No specific kidney related complaints or concerns today        Objective   /71 (BP Location: Left arm, Patient Position: Standing, BP Cuff Size: Adult)   Pulse 62   Temp 36.1 °C (96.9 °F)   Resp 16   Ht 1.626 m (5' 4\")   Wt 88.6 kg (195 lb 6.4 oz)   SpO2 99%   BMI 33.54 kg/m²   Wt Readings from Last 3 Encounters:   09/30/24 88.6 kg (195 lb 6.4 oz)   08/19/24 88 kg (194 lb)   08/14/24 86.7 kg (191 lb 3.2 oz)       Physical Exam    General appearance: no distress awake and alert on room air, no significant hypervolemia on exam  Eyes: non-icteric  HEENT: atrumatic head, PEERLA, moist mucosa  Skin: no apparent rash  Heart: NSR, S1, S2 normal, systolic murmur noticed  Lungs: Symmetrical expansion,CTA bilat no wheezing/crackles  Abdomen: soft, nt/nd, obese  Extremities: no significant edema bilat  Neuro: No FND,asterixis, no focal deficits noticed        Review of Systems     Constitutional lethargy, fatigue, low energy, weight gain, blurry vision, dizziness  Eyes blurry vision  ENT: no hearing loss, no earache, no sore throat, no swollen glands in the neck and no nasal discharge.   Cardiovascular occasional leg swelling  Respiratory: no shortness of breath, no chronic cough and no shortness of breath during exertion.   Gastrointestinal: no abdominal pain, no constipation, no heartburn, no vomiting, no bloody stools and no change in bowel movements.   Genitourinary: no dysuria and no hematuria.   Musculoskeletal: no arthralgias and no myalgias.   Skin: no rashes and no skin " "lesions.   Neurological: no headaches and no dizziness.   Psychiatric: no confusion, no depression and no anxiety.   Endocrine: no heat intolerance, no cold intolerance, appetite not increased, no thyroid disorder, no increased urinary frequency and no dry skin.   Hematologic/Lymphatic: does not bleed easily and does not bruise easily.   All other systems have been reviewed and are negative for complaint.         Data Review                   No results found for: \"URICACID\"        No results found for: \"HGBA1C\"              No lab exists for component: \"CR\", \"PHOSPHORUS\"          Albumin/Creatinine Ratio   Date Value Ref Range Status   09/23/2024   Final     Comment:     One or more analytes used in this calculation is outside of the analytical measurement range. Calculation cannot be performed.            RFP  Recent Labs     09/23/24  1032 03/19/24  1056 01/17/24  1110 01/10/24  0656 01/09/24  0651 01/08/24  1736 12/19/23  0639 12/18/23  0549 12/17/23  0703    144 146* 143 141 141 139 138 137   K 5.1 4.9 4.6 4.0 4.2 4.3 3.6 3.8 3.9    107 109* 108* 106 102 105 104 104   CO2 26 29 27 26 25 23 24 25 24   BUN 18 21 20 30* 27* 30* 7 9 12   CREATININE 1.18* 1.20* 1.75* 2.73* 2.73* 3.02* 0.95 1.01 0.95   GLUCOSE 93 88 99 100* 99 94 102* 98 101*   CALCIUM 9.8 9.9 9.9 9.4 9.3 9.5 8.8 9.0 8.9   PHOS 4.2 4.0 4.0  --   --   --  3.9 4.2 3.5   EGFR 46* 45* 29* 17* 17* 15* 60* 55* 60*   ANIONGAP 15 13 15 13 14 20 14 13 13        Urineanalysis  Recent Labs     07/08/24  1044 03/25/24  1542 01/08/24  1808 12/16/23  1537 12/16/23  1220 08/07/23  1227   COLORU Yellow Yellow Yellow Yellow Orange* Yellow   APPEARANCEU Clear Clear Clear Hazy* Clear Clear   SPECGRAVU 1.020 1.015 1.006 1.014 >=1.030 1.020   THOMAS 6.5 6.0 6.0 5.0 5.5 6.5   PROTUR NEGATIVE NEGATIVE NEGATIVE NEGATIVE 30 (1+) NEGATIVE   GLUCOSEU NEGATIVE NEGATIVE NEGATIVE NEGATIVE NEGATIVE NEGATIVE   BLOODU TRACE-Intact* NEGATIVE NEGATIVE NEGATIVE NEGATIVE " NEGATIVE   KETONESU NEGATIVE NEGATIVE NEGATIVE 20 (1+)* NEGATIVE NEGATIVE   BILIRUBINU NEGATIVE NEGATIVE NEGATIVE NEGATIVE SMALL (1+)* NEGATIVE   NITRITEU NEGATIVE NEGATIVE NEGATIVE NEGATIVE NEGATIVE NEGATIVE   LEUKOCYTESU  --   --  TRACE* LARGE (3+)*  --   --        Urine Electrolytes  Recent Labs     09/23/24  1125 07/08/24  1044 03/25/24  1542 01/08/24  1808 12/16/23  1537 12/16/23  1220 08/07/23  1227   SODIUMURR  --   --   --  37  --   --   --    NACREATUR  --   --   --  70  --   --   --    KUR  --   --   --  33  --   --   --    KCREATUR  --   --   --  62  --   --   --    CREATU 49.5  --   --  53.1  --   --   --    PROTUR  --  NEGATIVE NEGATIVE NEGATIVE NEGATIVE 30 (1+) NEGATIVE   ALBUMINUR <7.0  --   --   --   --   --   --         Urine Micro  Recent Labs     01/08/24  1808 12/16/23  1537 11/22/17  1455   WBCU 1-5 >50* 2   RBCU 1-2 3-5 <1   HYALCASTU  --  1+*  --    SQUAMEPIU  --  1-9 (SPARSE) <1   BACTERIAU  --  1+* 1+*   MUCUSU FEW FEW 1+        Iron  Recent Labs     09/23/24  1032 12/17/23  0703   IRON 83 35   TIBC 432 277   IRONSAT 19* 13*   FERRITIN 54 168*          Current Outpatient Medications on File Prior to Visit   Medication Sig Dispense Refill    amLODIPine (Norvasc) 5 mg tablet Take 1 tablet (5 mg) by mouth once daily. 90 tablet 3    aspirin 81 mg EC tablet Take 1 tablet (81 mg) by mouth once daily. 90 tablet 3    cholecalciferol (Vitamin D-3) 25 MCG (1000 UT) tablet Take 1 tablet (25 mcg) by mouth once daily.      ezetimibe (Zetia) 10 mg tablet Take 1 tablet (10 mg) by mouth once daily at bedtime. 90 tablet 3    incobotulinumtoxinA (Xeomin) 50 Units recon soln Inject into the shoulder, thigh, or buttocks every 3 months.      latanoprost (Xalatan) 0.005 % ophthalmic solution Administer into affected eye(s).      metoprolol succinate XL (Toprol-XL) 25 mg 24 hr tablet Take 1 tablet (25 mg) by mouth once daily. 90 tablet 3    nitroglycerin (Nitrostat) 0.4 mg SL tablet Place 1 tablet (0.4 mg) under  the tongue every 5 minutes if needed.      rosuvastatin (Crestor) 5 mg tablet Take 1 tablet (5 mg) by mouth once daily. 90 tablet 3    B complex-vitamin C-folic acid (Nephro-Elsa Rx) 1- mg-mg-mcg tablet Take 1 tablet by mouth once daily with breakfast.      omeprazole (PriLOSEC) 40 mg DR capsule Take 1 capsule (40 mg) by mouth 2 times a day. Do not crush or chew. 60 capsule 1    traZODone (Desyrel) 50 mg tablet Take 1 tablet (50 mg) by mouth as needed at bedtime for sleep. (Patient not taking: Reported on 9/30/2024) 90 tablet 0     No current facility-administered medications on file prior to visit.           Assessment and Plan       Shanika Diaz  is a 84 y.o. female who has past medical history of hypertension, coronary artery disease, hyperlipidemia, CAD, anxiety, GERD, IBS, diverticular disease status post recent diverticulitis with abscess in the middle of December 2023 who is coming today as chronic kidney disease follow-up    Impression    # Chronic kidney stage IIIa-most recent serum creatinine 1.1, GFR 46-improving from prior.  CKD is most likely due to prior acute kidney injury in January 2024    # Nonoliguric acute kidney injury in January 2024.  Serum creatinine peaked 3 GFR less than 15.  Within normal baseline serum creatinine.  Most likely hemodynamic injury in setting of hypertensive emergency with possible ATN.    # Urinalysis bland with negative spot test ACR    # CAT scan without contrast was reviewed and showed within normal kidneys with no obstruction.  Kidney ultrasound Doppler with no significant stenosis elevated Limited exam.  Urine electrolytes are inconclusive.    # Hypertension was prior hypertensive urgency with epistaxis with blood pressure above 200/100.  -Currently blood pressure is stable and in target.  -Current medication amlodipine 5 mg (dropped from 10 due to leg swelling), metoprolol XL 25 mg (dropped from 50 mg due to dizziness and low heart rate).  Not on RAAS  inhibitors or diuretics.  -Kidney ultrasound Doppler with no stenosis-unknown cause of hypertensive urgency     # Anemia possibly due to chronic kidney disease.  Accepted iron storage    # COronary artery disease-aortic stenosis possible plans for arctic valve replacement    Will follow-up in 12 months with CKD lab prior to the visit.  I will see her as needed earlier    Leeanna Estrada MD, MS, [facility]   Clinical  - [employer] [employer] [facility] [employer]   Nephrologist - [facility] - [facility]

## 2024-09-30 NOTE — TELEPHONE ENCOUNTER
Spoke with pt says her vision feels a little better, but she doesn't feel good a little lightheaded, some chills and sweats suggested she call back in the morning for an appt. She agreed.

## 2024-10-01 ENCOUNTER — OFFICE VISIT (OUTPATIENT)
Dept: PRIMARY CARE | Facility: CLINIC | Age: 84
End: 2024-10-01
Payer: MEDICARE

## 2024-10-01 VITALS
OXYGEN SATURATION: 95 % | HEART RATE: 64 BPM | HEIGHT: 64 IN | WEIGHT: 190 LBS | BODY MASS INDEX: 32.44 KG/M2 | DIASTOLIC BLOOD PRESSURE: 77 MMHG | SYSTOLIC BLOOD PRESSURE: 122 MMHG | TEMPERATURE: 97.7 F

## 2024-10-01 DIAGNOSIS — N18.4 CKD (CHRONIC KIDNEY DISEASE) STAGE 4, GFR 15-29 ML/MIN (MULTI): ICD-10-CM

## 2024-10-01 DIAGNOSIS — E78.2 COMBINED HYPERLIPIDEMIA: ICD-10-CM

## 2024-10-01 DIAGNOSIS — M54.50 ACUTE RIGHT-SIDED LOW BACK PAIN WITHOUT SCIATICA: ICD-10-CM

## 2024-10-01 DIAGNOSIS — I10 BENIGN ESSENTIAL HYPERTENSION: Primary | ICD-10-CM

## 2024-10-01 PROCEDURE — 1160F RVW MEDS BY RX/DR IN RCRD: CPT | Performed by: FAMILY MEDICINE

## 2024-10-01 PROCEDURE — 99214 OFFICE O/P EST MOD 30 MIN: CPT | Performed by: FAMILY MEDICINE

## 2024-10-01 PROCEDURE — 3074F SYST BP LT 130 MM HG: CPT | Performed by: FAMILY MEDICINE

## 2024-10-01 PROCEDURE — 1123F ACP DISCUSS/DSCN MKR DOCD: CPT | Performed by: FAMILY MEDICINE

## 2024-10-01 PROCEDURE — 1159F MED LIST DOCD IN RCRD: CPT | Performed by: FAMILY MEDICINE

## 2024-10-01 PROCEDURE — 1036F TOBACCO NON-USER: CPT | Performed by: FAMILY MEDICINE

## 2024-10-01 PROCEDURE — 3078F DIAST BP <80 MM HG: CPT | Performed by: FAMILY MEDICINE

## 2024-10-01 RX ORDER — PREDNISONE 10 MG/1
TABLET ORAL
Qty: 14 TABLET | Refills: 0 | Status: SHIPPED | OUTPATIENT
Start: 2024-10-01 | End: 2024-10-08

## 2024-10-01 ASSESSMENT — ENCOUNTER SYMPTOMS
APPETITE CHANGE: 0
NAUSEA: 0
UNEXPECTED WEIGHT CHANGE: 0

## 2024-10-01 ASSESSMENT — PATIENT HEALTH QUESTIONNAIRE - PHQ9
SUM OF ALL RESPONSES TO PHQ9 QUESTIONS 1 AND 2: 0
2. FEELING DOWN, DEPRESSED OR HOPELESS: NOT AT ALL
1. LITTLE INTEREST OR PLEASURE IN DOING THINGS: NOT AT ALL

## 2024-10-01 NOTE — PROGRESS NOTES
"Subjective   Patient ID: Shanika Diaz is a 84 y.o. female who presents for blurred vision, sciatica pain, chills.  a 84 y.o. female who presents for blurred vision, sciatica pain, chills. States the sciatic pain onset about two months ago located on her right side. Secondly, pt has been experiencing blurred vision and figured it was from the Trazodone so she stopped taking it as of last Friday; still a bit light headed and shaky. Last concern is with regards to feeling chilled. Still has issues with staying asleep.     HPI   Patient has hx of stable hypertension, hyperlipidemia.  Pt denies chest pain, shortness of breath and edema.  Patient's current treatment as listed in Rx.  Patient is compliant with treatment and complains of no side effects associated treatment.  c/o back pain, no numbness tingling weakness or change in bowel or bladder, no fever or chills, no blood in urine, no rash, no trauma, better with rest    Review of Systems   Constitutional:  Negative for appetite change and unexpected weight change.   Eyes:  Negative for visual disturbance.   Gastrointestinal:  Negative for nausea.       Objective   /77   Pulse 64   Temp 36.5 °C (97.7 °F)   Ht 1.626 m (5' 4\")   Wt 86.2 kg (190 lb)   SpO2 95%   BMI 32.61 kg/m²     Physical Exam  HENT:      Head: Normocephalic and atraumatic.      Nose: Nose normal.      Mouth/Throat:      Mouth: Mucous membranes are moist.      Pharynx: No oropharyngeal exudate.   Eyes:      Extraocular Movements: Extraocular movements intact.      Conjunctiva/sclera: Conjunctivae normal.      Pupils: Pupils are equal, round, and reactive to light.   Cardiovascular:      Rate and Rhythm: Normal rate and regular rhythm.   Pulmonary:      Effort: Pulmonary effort is normal.      Breath sounds: Normal breath sounds.   Abdominal:      General: There is no distension.      Palpations: Abdomen is soft.   Musculoskeletal:      Cervical back: Normal range of motion and neck " supple.   Lymphadenopathy:      Cervical: No cervical adenopathy.   Neurological:      General: No focal deficit present.      Mental Status: She is alert.   Psychiatric:         Attention and Perception: Attention normal.         Speech: Speech normal.         Behavior: Behavior is cooperative.     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     Continue statin  Follow Mediterranean diet and stay active         CKD (chronic kidney disease) stage 4, GFR 15-29 ml/min (Multi) N18.4     Other Visit Diagnoses         Codes    Acute right-sided low back pain without sciatica     M54.50    Relevant Medications    predniSONE (Deltasone) 10 mg tablet        Risk-benefit discussed and add medication as directed  Reviewed labs  DC trazodone  Dizziness will probably resolve over the next week or so since his started with trazodone  Therapy her back is not better plan x-rays and physical therapy   is present   recheck 1 week if not better sooner if worse

## 2024-10-01 NOTE — PROGRESS NOTES
Subjective   Patient ID: Shanika Diaz is a 84 y.o. female who presents for blurred vision, sciatica pain, chills. States the sciatic pain onset about two months ago located on her right side. Secondly, pt has been experiencing blurred vision and figured it was from the Trazodone so she stopped taking it as of last Friday; still a bit light headed and shaky. Last concern is with regards to feeling chilled. Still has issues with staying asleep. Struggling with anxiety.    HPI     Review of Systems    Objective   There were no vitals taken for this visit.    Physical Exam    Assessment/Plan

## 2024-12-19 PROBLEM — K57.32 DIVERTICULITIS OF COLON: Status: RESOLVED | Noted: 2024-01-15 | Resolved: 2024-12-19

## 2024-12-23 ENCOUNTER — APPOINTMENT (OUTPATIENT)
Dept: PRIMARY CARE | Facility: CLINIC | Age: 84
End: 2024-12-23
Payer: MEDICARE

## 2024-12-23 VITALS
OXYGEN SATURATION: 98 % | TEMPERATURE: 98.2 F | WEIGHT: 199 LBS | HEART RATE: 74 BPM | HEIGHT: 64 IN | DIASTOLIC BLOOD PRESSURE: 68 MMHG | SYSTOLIC BLOOD PRESSURE: 122 MMHG | BODY MASS INDEX: 33.97 KG/M2

## 2024-12-23 DIAGNOSIS — E78.2 COMBINED HYPERLIPIDEMIA: ICD-10-CM

## 2024-12-23 DIAGNOSIS — I10 BENIGN ESSENTIAL HYPERTENSION: Primary | ICD-10-CM

## 2024-12-23 DIAGNOSIS — N18.4 CKD (CHRONIC KIDNEY DISEASE) STAGE 4, GFR 15-29 ML/MIN (MULTI): ICD-10-CM

## 2024-12-23 PROBLEM — R10.814 ABDOMINAL TENDERNESS OF LEFT LOWER QUADRANT: Status: RESOLVED | Noted: 2023-08-07 | Resolved: 2024-12-23

## 2024-12-23 PROBLEM — R14.2 BURPING: Status: RESOLVED | Noted: 2024-03-08 | Resolved: 2024-12-23

## 2024-12-23 PROBLEM — E78.5 HYPERLIPIDEMIA: Status: RESOLVED | Noted: 2023-11-06 | Resolved: 2024-12-23

## 2024-12-23 PROCEDURE — 1036F TOBACCO NON-USER: CPT | Performed by: FAMILY MEDICINE

## 2024-12-23 PROCEDURE — 1160F RVW MEDS BY RX/DR IN RCRD: CPT | Performed by: FAMILY MEDICINE

## 2024-12-23 PROCEDURE — 99214 OFFICE O/P EST MOD 30 MIN: CPT | Performed by: FAMILY MEDICINE

## 2024-12-23 PROCEDURE — 3074F SYST BP LT 130 MM HG: CPT | Performed by: FAMILY MEDICINE

## 2024-12-23 PROCEDURE — G2211 COMPLEX E/M VISIT ADD ON: HCPCS | Performed by: FAMILY MEDICINE

## 2024-12-23 PROCEDURE — 3078F DIAST BP <80 MM HG: CPT | Performed by: FAMILY MEDICINE

## 2024-12-23 PROCEDURE — 1124F ACP DISCUSS-NO DSCNMKR DOCD: CPT | Performed by: FAMILY MEDICINE

## 2024-12-23 PROCEDURE — 1159F MED LIST DOCD IN RCRD: CPT | Performed by: FAMILY MEDICINE

## 2024-12-23 ASSESSMENT — ENCOUNTER SYMPTOMS
CONSTIPATION: 0
UNEXPECTED WEIGHT CHANGE: 0
VOMITING: 0
PALPITATIONS: 0

## 2024-12-23 NOTE — PROGRESS NOTES
"Subjective   Patient ID: Shanika Diaz is a 84 y.o. female who presents for Follow-up (Shanika is here today for routine 4 month F/U. ).    HPI   Patient has hx of stable hypertension, hyperlipidemia.  Pt denies chest pain, shortness of breath and edema.  Patient's current treatment as listed in Rx.  Patient is compliant with treatment and complains of no side effects associated treatment.  c/o back pain, no numbness tingling weakness or change in bowel or bladder, no fever or chills, no blood in urine, no rash, no trauma, better with rest   R lumbar spine hurting    Review of Systems   Constitutional:  Negative for unexpected weight change.   HENT:  Negative for congestion and ear discharge.    Cardiovascular:  Negative for chest pain and palpitations.   Gastrointestinal:  Negative for constipation and vomiting.   All other systems reviewed and are negative.      Objective   /68 (BP Location: Left arm, Patient Position: Sitting)   Pulse 74   Temp 36.8 °C (98.2 °F) (Temporal)   Ht 1.626 m (5' 4\")   Wt 90.3 kg (199 lb)   SpO2 98%   BMI 34.16 kg/m²     Physical Exam  HENT:      Head: Normocephalic and atraumatic.      Nose: Nose normal.      Mouth/Throat:      Mouth: Mucous membranes are moist.      Pharynx: No oropharyngeal exudate.   Eyes:      Extraocular Movements: Extraocular movements intact.      Conjunctiva/sclera: Conjunctivae normal.      Pupils: Pupils are equal, round, and reactive to light.   Cardiovascular:      Rate and Rhythm: Normal rate and regular rhythm.   Pulmonary:      Effort: Pulmonary effort is normal.      Breath sounds: Normal breath sounds.   Abdominal:      General: There is no distension.      Palpations: Abdomen is soft.   Musculoskeletal:      Cervical back: Normal range of motion and neck supple.   Lymphadenopathy:      Cervical: No cervical adenopathy.   Neurological:      General: No focal deficit present.      Mental Status: She is alert.   Psychiatric:         " Attention and Perception: Attention normal.         Speech: Speech normal.         Behavior: Behavior is cooperative.       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

## 2025-01-02 ENCOUNTER — TELEPHONE (OUTPATIENT)
Dept: CARDIOLOGY | Facility: CLINIC | Age: 85
End: 2025-01-02
Payer: MEDICARE

## 2025-01-15 ENCOUNTER — TELEPHONE (OUTPATIENT)
Facility: CLINIC | Age: 85
End: 2025-01-15
Payer: MEDICARE

## 2025-01-15 ENCOUNTER — TELEPHONE (OUTPATIENT)
Dept: GASTROENTEROLOGY | Facility: CLINIC | Age: 85
End: 2025-01-15
Payer: MEDICARE

## 2025-01-15 NOTE — TELEPHONE ENCOUNTER
Patient called stating having 3 days of abdominal pain generalized.  Had some chills.  Pain persisting.  Has history of diverticulitis with peridiverticular abscess.  I recommended she go to the emergency room for lab work and CT imaging given ongoing pain at this point.  She is in agreement with the plan.

## 2025-01-16 ENCOUNTER — APPOINTMENT (OUTPATIENT)
Dept: RADIOLOGY | Facility: HOSPITAL | Age: 85
End: 2025-01-16
Payer: MEDICARE

## 2025-01-16 ENCOUNTER — HOSPITAL ENCOUNTER (EMERGENCY)
Facility: HOSPITAL | Age: 85
Discharge: HOME | End: 2025-01-16
Attending: STUDENT IN AN ORGANIZED HEALTH CARE EDUCATION/TRAINING PROGRAM
Payer: MEDICARE

## 2025-01-16 ENCOUNTER — APPOINTMENT (OUTPATIENT)
Dept: CARDIOLOGY | Facility: HOSPITAL | Age: 85
End: 2025-01-16
Payer: MEDICARE

## 2025-01-16 VITALS
DIASTOLIC BLOOD PRESSURE: 64 MMHG | HEIGHT: 64 IN | HEART RATE: 81 BPM | WEIGHT: 199 LBS | RESPIRATION RATE: 16 BRPM | OXYGEN SATURATION: 98 % | TEMPERATURE: 98.4 F | SYSTOLIC BLOOD PRESSURE: 144 MMHG | BODY MASS INDEX: 33.97 KG/M2

## 2025-01-16 DIAGNOSIS — K44.9 HIATAL HERNIA: ICD-10-CM

## 2025-01-16 DIAGNOSIS — K57.90 DIVERTICULOSIS: ICD-10-CM

## 2025-01-16 DIAGNOSIS — I10 HYPERTENSION, UNSPECIFIED TYPE: ICD-10-CM

## 2025-01-16 DIAGNOSIS — R10.84 ABDOMINAL PAIN, GENERALIZED: Primary | ICD-10-CM

## 2025-01-16 LAB
ALBUMIN SERPL BCP-MCNC: 4.3 G/DL (ref 3.4–5)
ALP SERPL-CCNC: 90 U/L (ref 33–136)
ALT SERPL W P-5'-P-CCNC: 16 U/L (ref 7–45)
ANION GAP SERPL CALCULATED.3IONS-SCNC: 14 MMOL/L (ref 10–20)
APPEARANCE UR: CLEAR
AST SERPL W P-5'-P-CCNC: 17 U/L (ref 9–39)
BASOPHILS # BLD AUTO: 0.05 X10*3/UL (ref 0–0.1)
BASOPHILS NFR BLD AUTO: 0.7 %
BILIRUB SERPL-MCNC: 0.4 MG/DL (ref 0–1.2)
BILIRUB UR STRIP.AUTO-MCNC: NEGATIVE MG/DL
BUN SERPL-MCNC: 18 MG/DL (ref 6–23)
CALCIUM SERPL-MCNC: 9.5 MG/DL (ref 8.6–10.3)
CHLORIDE SERPL-SCNC: 106 MMOL/L (ref 98–107)
CO2 SERPL-SCNC: 23 MMOL/L (ref 21–32)
COLOR UR: NORMAL
CREAT SERPL-MCNC: 1.18 MG/DL (ref 0.5–1.05)
EGFRCR SERPLBLD CKD-EPI 2021: 46 ML/MIN/1.73M*2
EOSINOPHIL # BLD AUTO: 0.26 X10*3/UL (ref 0–0.4)
EOSINOPHIL NFR BLD AUTO: 3.7 %
ERYTHROCYTE [DISTWIDTH] IN BLOOD BY AUTOMATED COUNT: 13.2 % (ref 11.5–14.5)
GLUCOSE SERPL-MCNC: 88 MG/DL (ref 74–99)
GLUCOSE UR STRIP.AUTO-MCNC: NORMAL MG/DL
HCT VFR BLD AUTO: 39 % (ref 36–46)
HGB BLD-MCNC: 12.6 G/DL (ref 12–16)
IMM GRANULOCYTES # BLD AUTO: 0.02 X10*3/UL (ref 0–0.5)
IMM GRANULOCYTES NFR BLD AUTO: 0.3 % (ref 0–0.9)
KETONES UR STRIP.AUTO-MCNC: NEGATIVE MG/DL
LEUKOCYTE ESTERASE UR QL STRIP.AUTO: NEGATIVE
LIPASE SERPL-CCNC: 18 U/L (ref 9–82)
LYMPHOCYTES # BLD AUTO: 1.62 X10*3/UL (ref 0.8–3)
LYMPHOCYTES NFR BLD AUTO: 22.9 %
MAGNESIUM SERPL-MCNC: 2.34 MG/DL (ref 1.6–2.4)
MCH RBC QN AUTO: 29.3 PG (ref 26–34)
MCHC RBC AUTO-ENTMCNC: 32.3 G/DL (ref 32–36)
MCV RBC AUTO: 91 FL (ref 80–100)
MONOCYTES # BLD AUTO: 0.52 X10*3/UL (ref 0.05–0.8)
MONOCYTES NFR BLD AUTO: 7.4 %
NEUTROPHILS # BLD AUTO: 4.6 X10*3/UL (ref 1.6–5.5)
NEUTROPHILS NFR BLD AUTO: 65 %
NITRITE UR QL STRIP.AUTO: NEGATIVE
NRBC BLD-RTO: 0 /100 WBCS (ref 0–0)
PH UR STRIP.AUTO: 5.5 [PH]
PLATELET # BLD AUTO: 306 X10*3/UL (ref 150–450)
POTASSIUM SERPL-SCNC: 4.4 MMOL/L (ref 3.5–5.3)
PROT SERPL-MCNC: 7 G/DL (ref 6.4–8.2)
PROT UR STRIP.AUTO-MCNC: NEGATIVE MG/DL
RBC # BLD AUTO: 4.3 X10*6/UL (ref 4–5.2)
RBC # UR STRIP.AUTO: NEGATIVE /UL
SODIUM SERPL-SCNC: 139 MMOL/L (ref 136–145)
SP GR UR STRIP.AUTO: 1.02
UROBILINOGEN UR STRIP.AUTO-MCNC: NORMAL MG/DL
WBC # BLD AUTO: 7.1 X10*3/UL (ref 4.4–11.3)

## 2025-01-16 PROCEDURE — 93005 ELECTROCARDIOGRAM TRACING: CPT

## 2025-01-16 PROCEDURE — 99285 EMERGENCY DEPT VISIT HI MDM: CPT | Mod: 25 | Performed by: STUDENT IN AN ORGANIZED HEALTH CARE EDUCATION/TRAINING PROGRAM

## 2025-01-16 PROCEDURE — 74176 CT ABD & PELVIS W/O CONTRAST: CPT

## 2025-01-16 PROCEDURE — 81003 URINALYSIS AUTO W/O SCOPE: CPT | Performed by: PHYSICIAN ASSISTANT

## 2025-01-16 PROCEDURE — 36415 COLL VENOUS BLD VENIPUNCTURE: CPT | Performed by: PHYSICIAN ASSISTANT

## 2025-01-16 PROCEDURE — 2550000001 HC RX 255 CONTRASTS: Performed by: PHYSICIAN ASSISTANT

## 2025-01-16 PROCEDURE — A9698 NON-RAD CONTRAST MATERIALNOC: HCPCS | Performed by: PHYSICIAN ASSISTANT

## 2025-01-16 PROCEDURE — 83735 ASSAY OF MAGNESIUM: CPT | Performed by: PHYSICIAN ASSISTANT

## 2025-01-16 PROCEDURE — 84075 ASSAY ALKALINE PHOSPHATASE: CPT | Performed by: PHYSICIAN ASSISTANT

## 2025-01-16 PROCEDURE — 71045 X-RAY EXAM CHEST 1 VIEW: CPT | Performed by: RADIOLOGY

## 2025-01-16 PROCEDURE — 96374 THER/PROPH/DIAG INJ IV PUSH: CPT

## 2025-01-16 PROCEDURE — 85025 COMPLETE CBC W/AUTO DIFF WBC: CPT | Performed by: PHYSICIAN ASSISTANT

## 2025-01-16 PROCEDURE — 71045 X-RAY EXAM CHEST 1 VIEW: CPT

## 2025-01-16 PROCEDURE — 83690 ASSAY OF LIPASE: CPT | Performed by: PHYSICIAN ASSISTANT

## 2025-01-16 PROCEDURE — 2500000004 HC RX 250 GENERAL PHARMACY W/ HCPCS (ALT 636 FOR OP/ED): Performed by: PHYSICIAN ASSISTANT

## 2025-01-16 RX ORDER — OMEPRAZOLE 20 MG/1
20 CAPSULE, DELAYED RELEASE ORAL DAILY
Qty: 30 CAPSULE | Refills: 0 | Status: SHIPPED | OUTPATIENT
Start: 2025-01-16 | End: 2025-02-15

## 2025-01-16 RX ORDER — DICYCLOMINE HYDROCHLORIDE 20 MG/1
20 TABLET ORAL 2 TIMES DAILY
Qty: 14 TABLET | Refills: 0 | Status: SHIPPED | OUTPATIENT
Start: 2025-01-16 | End: 2025-01-23

## 2025-01-16 RX ORDER — PANTOPRAZOLE SODIUM 40 MG/10ML
40 INJECTION, POWDER, LYOPHILIZED, FOR SOLUTION INTRAVENOUS DAILY
Status: DISCONTINUED | OUTPATIENT
Start: 2025-01-16 | End: 2025-01-16 | Stop reason: HOSPADM

## 2025-01-16 RX ADMIN — PANTOPRAZOLE SODIUM 40 MG: 40 INJECTION, POWDER, FOR SOLUTION INTRAVENOUS at 20:56

## 2025-01-16 RX ADMIN — IOHEXOL 500 ML: 12 SOLUTION ORAL at 16:33

## 2025-01-16 ASSESSMENT — PAIN SCALES - GENERAL: PAINLEVEL_OUTOF10: 5 - MODERATE PAIN

## 2025-01-16 ASSESSMENT — PAIN DESCRIPTION - PAIN TYPE: TYPE: ACUTE PAIN

## 2025-01-16 ASSESSMENT — PAIN DESCRIPTION - LOCATION: LOCATION: ABDOMEN

## 2025-01-16 ASSESSMENT — PAIN - FUNCTIONAL ASSESSMENT: PAIN_FUNCTIONAL_ASSESSMENT: 0-10

## 2025-01-16 NOTE — ED PROVIDER NOTES
HPI   Chief Complaint   Patient presents with    Abdominal Pain       HPI  The patient is an 84-year-old female here for evaluation of abdominal pain.  It has been going on for the last 2 to 3 days, a generalized lower abdominal discomfort, does endorse a little bit of burning with urination.  History of diverticulitis with abscess in years past, she called her doctor and because of her history they recommended an ER evaluation.  No fevers or chills no cough shortness of breath or chest pain.  No diarrhea      Patient History   Past Medical History:   Diagnosis Date    Acute facial pain 03/08/2024    Atherosclerotic heart disease of native coronary artery without angina pectoris     Coronary artery disease without angina pectoris, unspecified vessel or lesion type, unspecified whether native or transplanted heart    Diverticulitis 03/20/2023    Dyspnea on exertion 03/08/2024    Gastritis 03/08/2024    Headache 03/08/2024    Heel pain 03/08/2024    Hypertension     Left foot pain 03/08/2024    Neck pain 03/08/2024    Otitis media 03/08/2024    Personal history of other diseases of the respiratory system 02/02/2019    History of sore throat    Personal history of other specified conditions     History of chest pain    Rash 03/08/2024    Shoulder pain 03/08/2024    Sprain of shoulder 03/08/2024    Urinary tract infection 03/08/2024     Past Surgical History:   Procedure Laterality Date    CARDIAC SURGERY      CATARACT EXTRACTION  09/15/2014    Cataract Surgery    CORONARY ANGIOPLASTY WITH STENT PLACEMENT  10/04/2016    LAD    OTHER SURGICAL HISTORY  09/15/2014    External Auditory Canal Surgery    OTHER SURGICAL HISTORY  12/12/2016    Cath Stent Placement Left Anterior Descending Artery    TONSILLECTOMY  09/15/2014    Tonsillectomy     Family History   Problem Relation Name Age of Onset    Arthritis Mother      Hyperlipidemia Mother      Cerebral aneurysm Father      Atrial fibrillation Brother          Chronic     Other (Cardiac disorder) Brother      Hypertension Brother      Lymphoma Brother       Social History     Tobacco Use    Smoking status: Never    Smokeless tobacco: Never   Vaping Use    Vaping status: Never Used   Substance Use Topics    Alcohol use: Not Currently     Alcohol/week: 7.0 standard drinks of alcohol     Types: 7 Cans of beer per week    Drug use: Never       Physical Exam   ED Triage Vitals [01/16/25 1351]   Temperature Heart Rate Respirations BP   36.9 °C (98.4 °F) 85 18 (!) 151/91      Pulse Ox Temp Source Heart Rate Source Patient Position   99 % Temporal Monitor Sitting      BP Location FiO2 (%)     Left arm --       Physical Exam  PHYSICAL EXAMINATION    GENERAL APPEARANCE: Awake and alert.     VITAL SIGNS: As per the nurses' triage record.     HEENT: Normocephalic, atraumatic. Extraocular muscles are intact. Pupils equal round and reactive to light. Conjunctiva are pink. Negative scleral icterus. Mucous membranes are moist. Tongue in the midline. Pharynx was without erythema or exudates, uvula midline    NECK: Soft Nontender and supple, full gross ROM, no meningeal signs.    CHEST: Nontender to palpation. Clear to auscultation bilaterally. No rales, rhonchi, or wheezing.     HEART: S1, S2. Regular rate and rhythm. No murmurs, gallops or rubs.  Strong and equal pulses in the extremities.     ABDOMEN: Soft, subjective lower abdominal discomfort no guarding rebound or rigidity, nondistended, positive bowel sounds, no palpable masses.    MUSCULOSKELETAL: The calves are nontender to palpation. Full gross active range of motion. Ambulating on own with no acute difficulties     NEUROLOGICAL: Awake, alert and oriented x 3. Power intact in the upper and lower extremities. Sensation is intact to light touch in the upper and lower extremities.     IMMUNOLOGICAL: No lymphatic streaking noted     DERM: No petechiae, rashes, or ecchymoses.    ED Course & MDM   ED Course as of 01/16/25 2042   Thu Jan 16, 2025    1421 EKG Time:1413  EKG Interpretation time:1421  EKG Interpretation: EKG shows sinus rhythm with occasional PVC with a rate of 78 bpm, normal axis, QTc normal at 421, no evidence of STEMI or active ischemia.    EKG was interpreted by myself independently [JL]   1458 Reevaluated the patient, no acute distress.  Patient currently drinking oral contrast. [AP]   1747 Creatinine(!): 1.18  Slightly better than baseline [AP]      ED Course User Index  [AP] Nikko Dean PA-C  [JL] Juan Daniel Muller DO         Diagnoses as of 01/16/25 2042   Abdominal pain, generalized   Hypertension, unspecified type   Hiatal hernia   Diverticulosis                 No data recorded     [place] Coma Scale Score: 15 (01/16/25 1525 : Zafar Escalera LPN)                           Medical Decision Making  Parts of this chart have been completed using voice recognition software. Please excuse any errors of transcription.  My thought process and reason for plan has been formulated from the time that I saw the patient until the time of disposition and is not specific to one specific moment during their visit and furthermore my MDM encompasses this entire chart and not only this text box.      HPI: Detailed above.    Exam: A medically appropriate exam performed, outlined above, given the known history and presentation.    History Limited by: Nothing    History obtained from: The patient    External/internal records reviewed: No external records reviewed    Social Determinants of Health considered during this visit: Lives at home    Chronic conditions impacting care: Denies    Medications given during visit:  Medications   sodium chloride 0.9 % bolus 1,000 mL (1,000 mL intravenous Not Given 1/16/25 1645)   pantoprazole (ProtoNix) injection 40 mg (has no administration in time range)   iohexol (OMNIPaque) 12 mg iodine/mL oral contrast 500 mL (500 mL oral Given 1/16/25 1633)        Diagnostic/tests  Labs Reviewed   COMPREHENSIVE  METABOLIC PANEL - Abnormal       Result Value    Glucose 88      Sodium 139      Potassium 4.4      Chloride 106      Bicarbonate 23      Anion Gap 14      Urea Nitrogen 18      Creatinine 1.18 (*)     eGFR 46 (*)     Calcium 9.5      Albumin 4.3      Alkaline Phosphatase 90      Total Protein 7.0      AST 17      Bilirubin, Total 0.4      ALT 16     LIPASE - Normal    Lipase 18      Narrative:     Venipuncture immediately after or during the administration of Metamizole may lead to falsely low results. Testing should be performed immediately prior to Metamizole dosing.   MAGNESIUM - Normal    Magnesium 2.34     URINALYSIS WITH REFLEX CULTURE AND MICROSCOPIC - Normal    Color, Urine Light-Yellow      Appearance, Urine Clear      Specific Gravity, Urine 1.018      pH, Urine 5.5      Protein, Urine NEGATIVE      Glucose, Urine Normal      Blood, Urine NEGATIVE      Ketones, Urine NEGATIVE      Bilirubin, Urine NEGATIVE      Urobilinogen, Urine Normal      Nitrite, Urine NEGATIVE      Leukocyte Esterase, Urine NEGATIVE     CBC WITH AUTO DIFFERENTIAL    WBC 7.1      nRBC 0.0      RBC 4.30      Hemoglobin 12.6      Hematocrit 39.0      MCV 91      MCH 29.3      MCHC 32.3      RDW 13.2      Platelets 306      Neutrophils % 65.0      Immature Granulocytes %, Automated 0.3      Lymphocytes % 22.9      Monocytes % 7.4      Eosinophils % 3.7      Basophils % 0.7      Neutrophils Absolute 4.60      Immature Granulocytes Absolute, Automated 0.02      Lymphocytes Absolute 1.62      Monocytes Absolute 0.52      Eosinophils Absolute 0.26      Basophils Absolute 0.05     URINALYSIS WITH REFLEX CULTURE AND MICROSCOPIC    Narrative:     The following orders were created for panel order Urinalysis with Reflex Culture and Microscopic.  Procedure                               Abnormality         Status                     ---------                               -----------         ------                     Urinalysis with Reflex  C...[[id number]]  Normal              Final result               Extra Urine Gray Tube[[id number]]                            In process                   Please view results for these tests on the individual orders.   EXTRA URINE GRAY TUBE      CT abdomen and pelvis w oral contrast only   Final Result   Small hiatal hernia. Colonic diverticulosis without acute   diverticulitis. Additional chronic findings as above.   No acute process in the abdomen and pelvis.        Signed by: Hai Beltran 1/16/2025 5:11 PM   Dictation workstation:   [id number]      XR chest 1 view   Final Result   No acute cardiopulmonary process.        MACRO:   None        Signed by: Jennifer Sewell 1/16/2025 2:23 PM   Dictation workstation:   [id number]          Case discussed with: ed attending      Considerations/further MDM:  I estimate there is low risk for acute abdomen,  I have considered to following, acute abdomen, acute appendicitis, acute bowel obstruction, cholecystitis, diverticulitis, incarcerated or strangulated hernia, mesenteric ischemia, pancreatitis, pelvic inflammatory disease, bowel perforation, ulcer, ectopic pregnancy, tubo-ovarian abscess, gynecologic etiologies, pregnancy, dehydration, electrolyte disturbances, of which require urgent/emergent intervention, -thus I consider the discharge disposition reasonable. Also, there is no evidence or peritonitis, acute liver failure, renal failure, UTI/Pyelonephritis to an extent that requires admission and IV abx sepsis, or toxicity. We have discussed the diagnosis and risks, and we agree with discharging home to follow-up with their primary doctor. We also discussed returning to the Emergency Department immediately if new or worsening symptoms occur. We have discussed the symptoms which are most concerning (e.g., bloody stool, fever, changing or worsening pain, vomiting) that necessitate immediate return.    Total findings, patient feels comfortable home-going plan, Bentyl  and omeprazole will be given.      Procedure  Procedures     Nikko Dean PA-C  01/16/25 2044

## 2025-01-17 LAB — HOLD SPECIMEN: NORMAL

## 2025-01-24 ENCOUNTER — APPOINTMENT (OUTPATIENT)
Dept: GASTROENTEROLOGY | Facility: CLINIC | Age: 85
End: 2025-01-24
Payer: MEDICARE

## 2025-01-30 ENCOUNTER — OFFICE VISIT (OUTPATIENT)
Dept: GASTROENTEROLOGY | Facility: CLINIC | Age: 85
End: 2025-01-30
Payer: MEDICARE

## 2025-01-30 VITALS — HEIGHT: 63 IN | HEART RATE: 75 BPM | BODY MASS INDEX: 35.08 KG/M2 | OXYGEN SATURATION: 97 % | WEIGHT: 198 LBS

## 2025-01-30 DIAGNOSIS — R10.84 GENERALIZED ABDOMINAL PAIN: Primary | ICD-10-CM

## 2025-01-30 DIAGNOSIS — K21.00 GASTROESOPHAGEAL REFLUX DISEASE WITH ESOPHAGITIS WITHOUT HEMORRHAGE: ICD-10-CM

## 2025-01-30 PROCEDURE — 1159F MED LIST DOCD IN RCRD: CPT | Performed by: INTERNAL MEDICINE

## 2025-01-30 PROCEDURE — 1160F RVW MEDS BY RX/DR IN RCRD: CPT | Performed by: INTERNAL MEDICINE

## 2025-01-30 PROCEDURE — 99214 OFFICE O/P EST MOD 30 MIN: CPT | Performed by: INTERNAL MEDICINE

## 2025-01-30 PROCEDURE — 1123F ACP DISCUSS/DSCN MKR DOCD: CPT | Performed by: INTERNAL MEDICINE

## 2025-01-30 RX ORDER — OMEPRAZOLE 40 MG/1
40 CAPSULE, DELAYED RELEASE ORAL 2 TIMES DAILY
Qty: 60 CAPSULE | Refills: 1 | Status: SHIPPED | OUTPATIENT
Start: 2025-01-30 | End: 2025-03-31

## 2025-01-30 NOTE — PROGRESS NOTES
REASON FOR VISIT: Follow-up abdominal pain    HPI:  Shanika Diaz is a 84 y.o. female here to follow-up on abdominal pain symptoms.  Has been sent to the emergency room 2 weeks ago for concern for possible diverticulitis.  CT was normal without any evidence of inflammatory findings.  Normal pancreas.  Labs unremarkable.  Continues to have epigastric and lower abdominal discomfort intermittently.  Occasional looser stools.  No rectal bleeding.          PRIOR ENDOSCOPY    PAST MEDICAL HISTORY  Past Medical History:   Diagnosis Date    Acute facial pain 03/08/2024    Atherosclerotic heart disease of native coronary artery without angina pectoris     Coronary artery disease without angina pectoris, unspecified vessel or lesion type, unspecified whether native or transplanted heart    Diverticulitis 03/20/2023    Dyspnea on exertion 03/08/2024    Gastritis 03/08/2024    Headache 03/08/2024    Heel pain 03/08/2024    Hypertension     Left foot pain 03/08/2024    Neck pain 03/08/2024    Otitis media 03/08/2024    Personal history of other diseases of the respiratory system 02/02/2019    History of sore throat    Personal history of other specified conditions     History of chest pain    Rash 03/08/2024    Shoulder pain 03/08/2024    Sprain of shoulder 03/08/2024    Urinary tract infection 03/08/2024       PAST SURGICAL HISTORY  Past Surgical History:   Procedure Laterality Date    CARDIAC SURGERY      CATARACT EXTRACTION  09/15/2014    Cataract Surgery    CORONARY ANGIOPLASTY WITH STENT PLACEMENT  10/04/2016    LAD    OTHER SURGICAL HISTORY  09/15/2014    External Auditory Canal Surgery    OTHER SURGICAL HISTORY  12/12/2016    Cath Stent Placement Left Anterior Descending Artery    TONSILLECTOMY  09/15/2014    Tonsillectomy       FAMILY HISTORY  Family History   Problem Relation Name Age of Onset    Arthritis Mother      Hyperlipidemia Mother      Cerebral aneurysm Father      Atrial fibrillation Brother           Chronic    Other (Cardiac disorder) Brother      Hypertension Brother      Lymphoma Brother         SOCIAL HISTORY  Social History     Tobacco Use    Smoking status: Never    Smokeless tobacco: Never   Substance Use Topics    Alcohol use: Not Currently     Alcohol/week: 7.0 standard drinks of alcohol     Types: 7 Cans of beer per week       REVIEW OF SYSTEMS  CONSTITUTIONAL: negative for fever, chills, fatigue, or unintentional weight loss,   HEENT: negative for icteric sclera, eye pain/redness, or changes in vision/hearing  RESPIRATORY: negative for cough, hemoptysis, wheezing, orthopnea, or dyspnea on exertion  CARDIOVASCULAR: negative for chest pain, palpitations, or syncope   GASTROINTESTINAL: as noted per HPI  GENITOURINARY: negative for dysuria, polyuria, incontinence, or hematuria  MUSCULOSKELETAL: negative for arthralgia, myalgia, or joint swelling/stiffness   INTEGUMENTARY/SKIN: negative jaundice, rash, or skin lesion  HEMATOLOGIC/LYMPHATIC: negative for prolonged bleeding, easy bruising, or swollen lymph nodes  ENDOCRINE: negative for cold/heat intolerance, polydipsia, polyuria, or goiter  NEUROLOGIC: negative for headaches, dizziness, tremor, or gait abnormality  PSYCHIATRIC: negative for anxiety, depression, personality changes, or sleep disturbances      A 10 point review of systems was completed and was otherwise negative.    ALLERGIES  Allergies   Allergen Reactions    Sulfa (Sulfonamide Antibiotics) Swelling, Rash and Angioedema    Codeine Hives    Penicillin Hives     Sever reaction ended up in ED    Trazodone Blurred vision       MEDICATIONS  Current Outpatient Medications   Medication Sig Dispense Refill    amLODIPine (Norvasc) 5 mg tablet Take 1 tablet (5 mg) by mouth once daily. 90 tablet 3    aspirin 81 mg EC tablet Take 1 tablet (81 mg) by mouth once daily. 90 tablet 3    B complex-vitamin C-folic acid (Nephro-Elsa Rx) 1- mg-mg-mcg tablet Take 1 tablet by mouth once daily with breakfast.  "     cholecalciferol (Vitamin D-3) 25 MCG (1000 UT) tablet Take 1 tablet (25 mcg) by mouth once daily.      ezetimibe (Zetia) 10 mg tablet Take 1 tablet (10 mg) by mouth once daily at bedtime. 90 tablet 3    incobotulinumtoxinA (Xeomin) 50 Units recon soln Inject into the shoulder, thigh, or buttocks every 3 months.      latanoprost (Xalatan) 0.005 % ophthalmic solution Administer into affected eye(s).      metoprolol succinate XL (Toprol-XL) 25 mg 24 hr tablet Take 1 tablet (25 mg) by mouth once daily. 90 tablet 3    nitroglycerin (Nitrostat) 0.4 mg SL tablet Place 1 tablet (0.4 mg) under the tongue every 5 minutes if needed.      omeprazole (PriLOSEC) 20 mg DR capsule Take 1 capsule (20 mg) by mouth once daily. Do not crush or chew. 30 capsule 0    omeprazole (PriLOSEC) 40 mg DR capsule Take 1 capsule (40 mg) by mouth 2 times a day. Do not crush or chew. 60 capsule 1    rosuvastatin (Crestor) 5 mg tablet Take 1 tablet (5 mg) by mouth once daily. 90 tablet 3     No current facility-administered medications for this visit.       VITALS  Pulse 75   Ht 1.6 m (5' 3\")   Wt 89.8 kg (198 lb)   SpO2 97%   BMI 35.07 kg/m²      PHYSICAL EXAM  Alert and oriented in no acute distress  Minimal epigastric tenderness to palpation, no rebound tenderness    ASSESSMENT/ PLAN  Patient with recent abdominal pain.  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  May be element of reflux esophagitis given her past history of mild esophagitis.  I will increase her omeprazole to 40 mg twice daily for 2 weeks in case she has some mild esophagitis.  Will attempt to avoid endoscopy given her moderate aortic stenosis and her age.  I asked her also to try peppermint oil therapy to see if that helps with her abdominal discomfort.  She is in agreement with the plan will see me back as needed in the office.        Signature: Gregor Henriquez MD    Date: 1/30/2025  Time: 2:50 PM    "

## 2025-02-06 DIAGNOSIS — E78.2 COMBINED HYPERLIPIDEMIA: Primary | ICD-10-CM

## 2025-02-06 RX ORDER — EZETIMIBE 10 MG/1
10 TABLET ORAL NIGHTLY
Qty: 90 TABLET | Refills: 3 | Status: SHIPPED | OUTPATIENT
Start: 2025-02-06

## 2025-02-10 ENCOUNTER — APPOINTMENT (OUTPATIENT)
Dept: GASTROENTEROLOGY | Facility: CLINIC | Age: 85
End: 2025-02-10
Payer: MEDICARE

## 2025-02-12 NOTE — PROGRESS NOTES
Subjective   Shanika Diaz is a 83 y.o. female.    Chief Complaint:  Hypertension, Hyperlipidemia, Valve Disorder, and Coronary Artery Disease    Mrs. Diaz returns for a 6 month follow up. She is seen in collaboration with Dr. Espinosa. She unfortunately continues to struggle with dyspnea on exertion, excessive fatigue and dizziness. She is not very active because of her symptoms. She has remained compliant with her medications. She offers no other complaints or concerns today. She denies any complaints of chest pain, shortness of breath, palpitations, syncope, orthopnea, paroxysmal nocturnal dyspnea, lower extremity swelling or bleeding concerns.          Review of Systems   All other systems reviewed and are negative.      Objective   Physical Exam  Constitutional:       Appearance: Healthy appearance. In no distress  Pulmonary:      Effort: Pulmonary effort is normal.      Breath sounds: Normal breath sounds.   Cardiovascular:      Normal rate. Regular rhythm. Normal S1. Normal S2.       Murmurs: There is 3/6 systolic murmur.      Carotids: right carotid pulse +2, no bruit heard over the right carotid. left carotid pulse +2, no bruit heard over the left carotid.  Edema:     Peripheral edema absent.   Abdominal:      Palpations: Abdomen is soft.   Musculoskeletal:       Cervical back: Normal range of motion.   Skin:     General: Skin is warm and dry. Normal color and pigmentation   Neurological:      Mental Status: Alert and oriented to person, place and time.   Psychiatric:     Mood and Affect: appropriate mood and appropriate affect.     EKG obtained and reviewed. Normal sinus rhythm. Incomplete right bundle branch block. HR 78      Lab Review:   Lab Results   Component Value Date     04/14/2023    K 4.8 04/14/2023     04/14/2023    CO2 27 04/14/2023    BUN 15 04/14/2023    CREATININE 1.18 (H) 04/14/2023    GLUCOSE 100 (H) 04/14/2023    CALCIUM 10.2 04/14/2023     Lab Results   Component  Value Date    WBC 5.5 04/14/2023    HGB 13.0 04/14/2023    HCT 41.4 04/14/2023    MCV 93 04/14/2023     04/14/2023     Lab Results   Component Value Date    CHOL 179 10/26/2022    TRIG 120 10/26/2022    HDL 75.9 10/26/2022       Assessment/Plan   Mrs. Diaz is a pleasant 83 year old  female with a past medical history significant for hypertension, hyperlipidemia and coronary artery disease. Heart catheterization 1/2023 showed a widely patent mid LAD stent with moderate AS. Echocardiogram 5/2023 showed an EF of 60-65% with trace MR and mild-moderate AS. She presents today for routine follow up fairly stable from a cardiac standpoint. Her VS and EKG remain stable. She was being worked up for a TAVR, though at this point they feel it is not indicated and that her AS can be monitored. She unfortunately continues to struggle with dyspnea on exertion, activity intolerance and excessive fatigue. I will have her continue all medications unchanged. She will follow up with us in clinic in 3 months with an echo the same day to reassess her LV function and aortic stenosis. I will also put in a referral for sleep medicine due to her complaints of excessive fatigue. She knows to call for any concerns.    No

## 2025-02-21 DIAGNOSIS — I10 BENIGN ESSENTIAL HYPERTENSION: Primary | ICD-10-CM

## 2025-02-21 RX ORDER — AMLODIPINE BESYLATE 5 MG/1
5 TABLET ORAL DAILY
Qty: 90 TABLET | Refills: 3 | Status: SHIPPED | OUTPATIENT
Start: 2025-02-21 | End: 2026-02-21

## 2025-02-24 NOTE — PATIENT INSTRUCTIONS
Take metoprolol 1/2 tablet daily     Follow up in 2 months     Call for any concerns    Cimzia Counseling:  I discussed with the patient the risks of Cimzia including but not limited to immunosuppression, allergic reactions and infections.  The patient understands that monitoring is required including a PPD at baseline and must alert us or the primary physician if symptoms of infection or other concerning signs are noted. Wartpeel Counseling:  I discussed with the patient the risks of Wartpeel including but not limited to erythema, scaling, itching, weeping, crusting, and pain. Metronidazole Pregnancy And Lactation Text: This medication is Pregnancy Category B and considered safe during pregnancy.  It is also excreted in breast milk. Dutasteride Pregnancy And Lactation Text: This medication is absolutely contraindicated in women, especially during pregnancy and breast feeding. Feminization of male fetuses is possible if taking while pregnant. Mirvaso Counseling: Mirvaso is a topical medication which can decrease superficial blood flow where applied. Side effects are uncommon and include stinging, redness and allergic reactions. Hyrimoz Counseling:  I discussed with the patient the risks of adalimumab including but not limited to myelosuppression, immunosuppression, autoimmune hepatitis, demyelinating diseases, lymphoma, and serious infections.  The patient understands that monitoring is required including a PPD at baseline and must alert us or the primary physician if symptoms of infection or other concerning signs are noted. Dapsone Counseling: I discussed with the patient the risks of dapsone including but not limited to hemolytic anemia, agranulocytosis, rashes, methemoglobinemia, kidney failure, peripheral neuropathy, headaches, GI upset, and liver toxicity.  Patients who start dapsone require monitoring including baseline LFTs and weekly CBCs for the first month, then every month thereafter.  The patient verbalized understanding of the proper use and possible adverse effects of dapsone.  All of the patient's questions and concerns were addressed. Azathioprine Pregnancy And Lactation Text: This medication is Pregnancy Category D and isn't considered safe during pregnancy. It is unknown if this medication is excreted in breast milk. Calcipotriene Pregnancy And Lactation Text: The use of this medication during pregnancy or lactation is not recommended as there is insufficient data. Oxybutynin Pregnancy And Lactation Text: This medication is Pregnancy Category B and is considered safe during pregnancy. It is unknown if it is excreted in breast milk. Solaraze Counseling:  I discussed with the patient the risks of Solaraze including but not limited to erythema, scaling, itching, weeping, crusting, and pain. Spevigo Pregnancy And Lactation Text: The risk during pregnancy and breastfeeding is uncertain with this medication. This medication does cross the placenta. It is unknown if this medication is found in breast milk. Albendazole Counseling:  I discussed with the patient the risks of albendazole including but not limited to cytopenia, kidney damage, nausea/vomiting and severe allergy.  The patient understands that this medication is being used in an off-label manner. Tranexamic Acid Counseling:  Patient advised of the small risk of bleeding problems with tranexamic acid. They were also instructed to call if they developed any nausea, vomiting or diarrhea. All of the patient's questions and concerns were addressed. Wartpeel Pregnancy And Lactation Text: This medication is Pregnancy Category X and contraindicated in pregnancy and in women who may become pregnant. It is unknown if this medication is excreted in breast milk. Erivedge Counseling- I discussed with the patient the risks of Erivedge including but not limited to nausea, vomiting, diarrhea, constipation, weight loss, changes in the sense of taste, decreased appetite, muscle spasms, and hair loss.  The patient verbalized understanding of the proper use and possible adverse effects of Erivedge.  All of the patient's questions and concerns were addressed. Minocycline Counseling: Patient advised regarding possible photosensitivity and discoloration of the teeth, skin, lips, tongue and gums.  Patient instructed to avoid sunlight, if possible.  When exposed to sunlight, patients should wear protective clothing, sunglasses, and sunscreen.  The patient was instructed to call the office immediately if the following severe adverse effects occur:  hearing changes, easy bruising/bleeding, severe headache, or vision changes.  The patient verbalized understanding of the proper use and possible adverse effects of minocycline.  All of the patient's questions and concerns were addressed. Opioid Counseling: I discussed with the patient the potential side effects of opioids including but not limited to addiction, altered mental status, and depression. I stressed avoiding alcohol, benzodiazepines, muscle relaxants and sleep aids unless specifically okayed by a physician. The patient verbalized understanding of the proper use and possible adverse effects of opioids. All of the patient's questions and concerns were addressed. They were instructed to flush the remaining pills down the toilet if they did not need them for pain. Cimzia Pregnancy And Lactation Text: This medication crosses the placenta but can be considered safe in certain situations. Cimzia may be excreted in breast milk. Mirvaso Pregnancy And Lactation Text: This medication has not been assigned a Pregnancy Risk Category. It is unknown if the medication is excreted in breast milk. Finasteride Male Counseling: Finasteride Counseling:  I discussed with the patient the risks of use of finasteride including but not limited to decreased libido, decreased ejaculate volume, gynecomastia, and depression. Women should not handle medication.  All of the patient's questions and concerns were addressed. Hyrimoz Pregnancy And Lactation Text: This medication is Pregnancy Category B and is considered safe during pregnancy. It is unknown if this medication is excreted in breast milk. Cellcept Counseling:  I discussed with the patient the risks of mycophenolate mofetil including but not limited to infection/immunosuppression, GI upset, hypokalemia, hypercholesterolemia, bone marrow suppression, lymphoproliferative disorders, malignancy, GI ulceration/bleed/perforation, colitis, interstitial lung disease, kidney failure, progressive multifocal leukoencephalopathy, and birth defects.  The patient understands that monitoring is required including a baseline creatinine and regular CBC testing. In addition, patient must alert us immediately if symptoms of infection or other concerning signs are noted. Dapsone Pregnancy And Lactation Text: This medication is Pregnancy Category C and is not considered safe during pregnancy or breast feeding. Albendazole Pregnancy And Lactation Text: This medication is Pregnancy Category C and it isn't known if it is safe during pregnancy. It is also excreted in breast milk. Propranolol Counseling:  I discussed with the patient the risks of propranolol including but not limited to low heart rate, low blood pressure, low blood sugar, restlessness and increased cold sensitivity. They should call the office if they experience any of these side effects. Tranexamic Acid Pregnancy And Lactation Text: It is unknown if this medication is safe during pregnancy or breast feeding. Solaraze Pregnancy And Lactation Text: This medication is Pregnancy Category B and is considered safe. There is some data to suggest avoiding during the third trimester. It is unknown if this medication is excreted in breast milk. Cantharidin Counseling:  I discussed with the patient the risks of Cantharidin including but not limited to pain, redness, burning, itching, and blistering. Stelara Counseling:  I discussed with the patient the risks of ustekinumab including but not limited to immunosuppression, malignancy, posterior leukoencephalopathy syndrome, and serious infections.  The patient understands that monitoring is required including a PPD at baseline and must alert us or the primary physician if symptoms of infection or other concerning signs are noted. Rinvoq Counseling: I discussed with the patient the risks of Rinvoq therapy including but not limited to upper respiratory tract infections, shingles, cold sores, bronchitis, nausea, cough, fever, acne, and headache. Live vaccines should be avoided.  This medication has been linked to serious infections; higher rate of mortality; malignancy and lymphoproliferative disorders; major adverse cardiovascular events; thrombosis; thrombocytopenia, anemia, and neutropenia; lipid elevations; liver enzyme elevations; and gastrointestinal perforations. Cosentyx Counseling:  I discussed with the patient the risks of Cosentyx including but not limited to worsening of Crohn's disease, immunosuppression, allergic reactions and infections.  The patient understands that monitoring is required including a PPD at baseline and must alert us or the primary physician if symptoms of infection or other concerning signs are noted. Minocycline Pregnancy And Lactation Text: This medication is Pregnancy Category D and not consider safe during pregnancy. It is also excreted in breast milk. Erivedge Pregnancy And Lactation Text: This medication is Pregnancy Category X and is absolutely contraindicated during pregnancy. It is unknown if it is excreted in breast milk. Winlevi Counseling:  I discussed with the patient the risks of topical clascoterone including but not limited to erythema, scaling, itching, and stinging. Patient voiced their understanding. Fluconazole Counseling:  Patient counseled regarding adverse effects of fluconazole including but not limited to headache, diarrhea, nausea, upset stomach, liver function test abnormalities, taste disturbance, and stomach pain.  There is a rare possibility of liver failure that can occur when taking fluconazole.  The patient understands that monitoring of LFTs and kidney function test may be required, especially at baseline. The patient verbalized understanding of the proper use and possible adverse effects of fluconazole.  All of the patient's questions and concerns were addressed. Opzelura Counseling:  I discussed with the patient the risks of Opzelura including but not limited to nasopharngitis, bronchitis, ear infection, eosinophila, hives, diarrhea, folliculitis, tonsillitis, and rhinorrhea.  Taken orally, this medication has been linked to serious infections; higher rate of mortality; malignancy and lymphoproliferative disorders; major adverse cardiovascular events; thrombosis; thrombocytopenia, anemia, and neutropenia; and lipid elevations. Finasteride Female Counseling: Finasteride Counseling:  I discussed with the patient the risks of use of finasteride including but not limited to decreased libido and sexual dysfunction. Explained the teratogenic nature of the medication and stressed the importance of not getting pregnant during treatment. All of the patient's questions and concerns were addressed. Opioid Pregnancy And Lactation Text: These medications can lead to premature delivery and should be avoided during pregnancy. These medications are also present in breast milk in small amounts. Ilumya Counseling: I discussed with the patient the risks of tildrakizumab including but not limited to immunosuppression, malignancy, posterior leukoencephalopathy syndrome, and serious infections.  The patient understands that monitoring is required including a PPD at baseline and must alert us or the primary physician if symptoms of infection or other concerning signs are noted. Gabapentin Counseling: I discussed with the patient the risks of gabapentin including but not limited to dizziness, somnolence, fatigue and ataxia. Valtrex Counseling: I discussed with the patient the risks of valacyclovir including but not limited to kidney damage, nausea, vomiting and severe allergy.  The patient understands that if the infection seems to be worsening or is not improving, they are to call. Soolantra Counseling: I discussed with the patients the risks of topial Soolantra. This is a medicine which decreases the number of mites and inflammation in the skin. You experience burning, stinging, eye irritation or allergic reactions.  Please call our office if you develop any problems from using this medication. Propranolol Pregnancy And Lactation Text: This medication is Pregnancy Category C and it isn't known if it is safe during pregnancy. It is excreted in breast milk. Ivermectin Counseling:  Patient instructed to take medication on an empty stomach with a full glass of water.  Patient informed of potential adverse effects including but not limited to nausea, diarrhea, dizziness, itching, and swelling of the extremities or lymph nodes.  The patient verbalized understanding of the proper use and possible adverse effects of ivermectin.  All of the patient's questions and concerns were addressed. 5-Fu Counseling: 5-Fluorouracil Counseling:  I discussed with the patient the risks of 5-fluorouracil including but not limited to erythema, scaling, itching, weeping, crusting, and pain. Cibinqo Counseling: I discussed with the patient the risks of Cibinqo therapy including but not limited to common cold, nausea, headache, cold sores, increased blood CPK levels, dizziness, UTIs, fatigue, acne, and vomitting. Live vaccines should be avoided.  This medication has been linked to serious infections; higher rate of mortality; malignancy and lymphoproliferative disorders; major adverse cardiovascular events; thrombosis; thrombocytopenia and lymphopenia; lipid elevations; and retinal detachment. Rinvoq Pregnancy And Lactation Text: Based on animal studies, Rinvoq may cause embryo-fetal harm when administered to pregnant women.  The medication should not be used in pregnancy.  Breastfeeding is not recommended during treatment and for 6 days after the last dose. Winlevi Pregnancy And Lactation Text: This medication is considered safe during pregnancy and breastfeeding. Libtayo Counseling- I discussed with the patient the risks of Libtayo including but not limited to nausea, vomiting, diarrhea, and bone or muscle pain.  The patient verbalized understanding of the proper use and possible adverse effects of Libtayo.  All of the patient's questions and concerns were addressed. Quinolones Counseling:  I discussed with the patient the risks of fluoroquinolones including but not limited to GI upset, allergic reaction, drug rash, diarrhea, dizziness, photosensitivity, yeast infections, liver function test abnormalities, tendonitis/tendon rupture. Fluconazole Pregnancy And Lactation Text: This medication is Pregnancy Category C and it isn't know if it is safe during pregnancy. It is also excreted in breast milk. Opzelura Pregnancy And Lactation Text: There is insufficient data to evaluate drug-associated risk for major birth defects, miscarriage, or other adverse maternal or fetal outcomes.  There is a pregnancy registry that monitors pregnancy outcomes in pregnant persons exposed to the medication during pregnancy.  It is unknown if this medication is excreted in breast milk.  Do not breastfeed during treatment and for about 4 weeks after the last dose. Ilumya Pregnancy And Lactation Text: The risk during pregnancy and breastfeeding is uncertain with this medication. Gabapentin Pregnancy And Lactation Text: This medication is Pregnancy Category C and isn't considered safe during pregnancy. It is excreted in breast milk. Finasteride Pregnancy And Lactation Text: This medication is absolutely contraindicated during pregnancy. It is unknown if it is excreted in breast milk. Cimetidine Counseling:  I discussed with the patient the risks of Cimetidine including but not limited to gynecomastia, headache, diarrhea, nausea, drowsiness, arrhythmias, pancreatitis, skin rashes, psychosis, bone marrow suppression and kidney toxicity. SSKI Counseling:  I discussed with the patient the risks of SSKI including but not limited to thyroid abnormalities, metallic taste, GI upset, fever, headache, acne, arthralgias, paraesthesias, lymphadenopathy, easy bleeding, arrhythmias, and allergic reaction. Soolantra Pregnancy And Lactation Text: This medication is Pregnancy Category C. This medication is considered safe during breast feeding. Cyclophosphamide Counseling:  I discussed with the patient the risks of cyclophosphamide including but not limited to hair loss, hormonal abnormalities, decreased fertility, abdominal pain, diarrhea, nausea and vomiting, bone marrow suppression and infection. The patient understands that monitoring is required while taking this medication. Valtrex Pregnancy And Lactation Text: this medication is Pregnancy Category B and is considered safe during pregnancy. This medication is not directly found in breast milk but it's metabolite acyclovir is present. Cibinqo Pregnancy And Lactation Text: It is unknown if this medication will adversely affect pregnancy or breast feeding.  You should not take this medication if you are currently pregnant or planning a pregnancy or while breastfeeding. Xeljanz Counseling: I discussed with the patient the risks of Xeljanz therapy including increased risk of infection, liver issues, headache, diarrhea, or cold symptoms. Live vaccines should be avoided. They were instructed to call if they have any problems. Taltz Counseling: I discussed with the patient the risks of ixekizumab including but not limited to immunosuppression, serious infections, worsening of inflammatory bowel disease and drug reactions.  The patient understands that monitoring is required including a PPD at baseline and must alert us or the primary physician if symptoms of infection or other concerning signs are noted. VTAMA Counseling: I discussed with the patient that VTAMA is not for use in the eyes, mouth or mouth. They should call the office if they develop any signs of allergic reactions to VTAMA. The patient verbalized understanding of the proper use and possible adverse effects of VTAMA.  All of the patient's questions and concerns were addressed. Dupixent Counseling: I discussed with the patient the risks of dupilumab including but not limited to eye infection and irritation, cold sores, injection site reactions, worsening of asthma, allergic reactions and increased risk of parasitic infection.  Live vaccines should be avoided while taking dupilumab. Dupilumab will also interact with certain medications such as warfarin and cyclosporine. The patient understands that monitoring is required and they must alert us or the primary physician if symptoms of infection or other concerning signs are noted. Libtayo Pregnancy And Lactation Text: This medication is contraindicated in pregnancy and when breast feeding. Picato Counseling:  I discussed with the patient the risks of Picato including but not limited to erythema, scaling, itching, weeping, crusting, and pain. Infliximab Counseling:  I discussed with the patient the risks of infliximab including but not limited to myelosuppression, immunosuppression, autoimmune hepatitis, demyelinating diseases, lymphoma, and serious infections.  The patient understands that monitoring is required including a PPD at baseline and must alert us or the primary physician if symptoms of infection or other concerning signs are noted. Birth Control Pills Counseling: Birth Control Pill Counseling: I discussed with the patient the potential side effects of OCPs including but not limited to increased risk of stroke, heart attack, thrombophlebitis, deep venous thrombosis, hepatic adenomas, breast changes, GI upset, headaches, and depression.  The patient verbalized understanding of the proper use and possible adverse effects of OCPs. All of the patient's questions and concerns were addressed. Griseofulvin Counseling:  I discussed with the patient the risks of griseofulvin including but not limited to photosensitivity, cytopenia, liver damage, nausea/vomiting and severe allergy.  The patient understands that this medication is best absorbed when taken with a fatty meal (e.g., ice cream or french fries). Glycopyrrolate Counseling:  I discussed with the patient the risks of glycopyrrolate including but not limited to skin rash, drowsiness, dry mouth, difficulty urinating, and blurred vision. Topical Retinoid counseling:  Patient advised to apply a pea-sized amount only at bedtime and wait 30 minutes after washing their face before applying.  If too drying, patient may add a non-comedogenic moisturizer. The patient verbalized understanding of the proper use and possible adverse effects of retinoids.  All of the patient's questions and concerns were addressed. Cimetidine Pregnancy And Lactation Text: This medication is Pregnancy Category B and is considered safe during pregnancy. It is also excreted in breast milk and breast feeding isn't recommended. Cyclophosphamide Pregnancy And Lactation Text: This medication is Pregnancy Category D and it isn't considered safe during pregnancy. This medication is excreted in breast milk. Wegovy Counseling: I reviewed the possible side effects including: thyroid tumors, kidney disease, gallbladder disease, abdominal pain, constipation, diarrhea, nausea, vomiting and pancreatitis. Do not take this medication if you have a history or family history of multiple endocrine neoplasia syndrome type 2. Side effects reviewed, pt to contact office should one occur. Oral Minoxidil Counseling- I discussed with the patient the risks of oral minoxidil including but not limited to shortness of breath, swelling of the feet or ankles, dizziness, lightheadedness, unwanted hair growth and allergic reaction.  The patient verbalized understanding of the proper use and possible adverse effects of oral minoxidil.  All of the patient's questions and concerns were addressed. Benzoyl Peroxide Counseling: Patient counseled that medicine may cause skin irritation and bleach clothing.  In the event of skin irritation, the patient was advised to reduce the amount of the drug applied or use it less frequently.   The patient verbalized understanding of the proper use and possible adverse effects of benzoyl peroxide.  All of the patient's questions and concerns were addressed. Simponi Counseling:  I discussed with the patient the risks of golimumab including but not limited to myelosuppression, immunosuppression, autoimmune hepatitis, demyelinating diseases, lymphoma, and serious infections.  The patient understands that monitoring is required including a PPD at baseline and must alert us or the primary physician if symptoms of infection or other concerning signs are noted. Adbry Counseling: I discussed with the patient the risks of tralokinumab including but not limited to eye infection and irritation, cold sores, injection site reactions, worsening of asthma, allergic reactions and increased risk of parasitic infection.  Live vaccines should be avoided while taking tralokinumab. The patient understands that monitoring is required and they must alert us or the primary physician if symptoms of infection or other concerning signs are noted. Doxycycline Pregnancy And Lactation Text: This medication is Pregnancy Category D and not consider safe during pregnancy. It is also excreted in breast milk but is considered safe for shorter treatment courses. Topical Steroids Counseling: I discussed with the patient that prolonged use of topical steroids can result in the increased appearance of superficial blood vessels (telangiectasias), lightening (hypopigmentation) and thinning of the skin (atrophy).  Patient understands to avoid using high potency steroids in skin folds, the groin or the face.  The patient verbalized understanding of the proper use and possible adverse effects of topical steroids.  All of the patient's questions and concerns were addressed. High Dose Vitamin A Counseling: Side effects reviewed, pt to contact office should one occur. Klisyri Counseling:  I discussed with the patient the risks of Klisyri including but not limited to erythema, scaling, itching, weeping, crusting, and pain. Clofazimine Counseling:  I discussed with the patient the risks of clofazimine including but not limited to skin and eye pigmentation, liver damage, nausea/vomiting, gastrointestinal bleeding and allergy. Wegovy Pregnancy And Lactation Text: The fetal risk of this medication is unknown and taking while pregnant is not recommended. It is unknown if this medication is present in breast milk. Oral Minoxidil Pregnancy And Lactation Text: This medication should only be used when clearly needed if you are pregnant, attempting to become pregnant or breast feeding. Benzoyl Peroxide Pregnancy And Lactation Text: This medication is Pregnancy Category C. It is unknown if benzoyl peroxide is excreted in breast milk. Topical Steroids Applications Pregnancy And Lactation Text: Most topical steroids are considered safe to use during pregnancy and lactation.  Any topical steroid applied to the breast or nipple should be washed off before breastfeeding. Adbry Pregnancy And Lactation Text: It is unknown if this medication will adversely affect pregnancy or breast feeding. Erythromycin Counseling:  I discussed with the patient the risks of erythromycin including but not limited to GI upset, allergic reaction, drug rash, diarrhea, increase in liver enzymes, and yeast infections. Klisyri Pregnancy And Lactation Text: It is unknown if this medication can harm a developing fetus or if it is excreted in breast milk. High Dose Vitamin A Pregnancy And Lactation Text: High dose vitamin A therapy is contraindicated during pregnancy and breast feeding. Sotyktu Counseling:  I discussed the most common side effects of Sotyktu including: common cold, sore throat, sinus infections, cold sores, canker sores, folliculitis, and acne.? I also discussed more serious side effects of Sotyktu including but not limited to: serious allergic reactions; increased risk for infections such as TB; cancers such as lymphomas; rhabdomyolysis and elevated CPK; and elevated triglycerides and liver enzymes.? Zepbound Counseling: I reviewed the possible side effects including: thyroid tumors, kidney disease, gallbladder disease, abdominal pain, constipation, diarrhea, nausea, vomiting and pancreatitis. Do not take this medication if you have a history or family history of multiple endocrine neoplasia syndrome type 2. Side effects reviewed, pt to contact office should one occur. Otezla Counseling: The side effects of Otezla were discussed with the patient, including but not limited to worsening or new depression, weight loss, diarrhea, nausea, upper respiratory tract infection, and headache. Patient instructed to call the office should any adverse effect occur.  The patient verbalized understanding of the proper use and possible adverse effects of Otezla.  All the patient's questions and concerns were addressed. Sski Pregnancy And Lactation Text: This medication is Pregnancy Category D and isn't considered safe during pregnancy. It is excreted in breast milk. Carac Counseling:  I discussed with the patient the risks of Carac including but not limited to erythema, scaling, itching, weeping, crusting, and pain. Bimzelx Counseling:  I discussed with the patient the risks of Bimzelx including but not limited to depression, immunosuppression, allergic reactions and infections.  The patient understands that monitoring is required including a PPD at baseline and must alert us or the primary physician if symptoms of infection or other concerning signs are noted. Skyrizi Counseling: I discussed with the patient the risks of risankizumab-rzaa including but not limited to immunosuppression, and serious infections.  The patient understands that monitoring is required including a PPD at baseline and must alert us or the primary physician if symptoms of infection or other concerning signs are noted. Topical Sulfur Applications Counseling: Topical Sulfur Counseling: Patient counseled that this medication may cause skin irritation or allergic reactions.  In the event of skin irritation, the patient was advised to reduce the amount of the drug applied or use it less frequently.   The patient verbalized understanding of the proper use and possible adverse effects of topical sulfur application.  All of the patient's questions and concerns were addressed. Sotyktu Pregnancy And Lactation Text: There is insufficient data to evaluate whether or not Sotyktu is safe to use during pregnancy.? ?It is not known if Sotyktu passes into breast milk and whether or not it is safe to use when breastfeeding.?? Erythromycin Pregnancy And Lactation Text: This medication is Pregnancy Category B and is considered safe during pregnancy. It is also excreted in breast milk. Minoxidil Counseling: Minoxidil is a topical medication which can increase blood flow where it is applied. It is uncertain how this medication increases hair growth. Side effects are uncommon and include stinging and allergic reactions. Dutasteride Male Counseling: Dustasteride Counseling:  I discussed with the patient the risks of use of dutasteride including but not limited to decreased libido, decreased ejaculate volume, and gynecomastia. Women who can become pregnant should not handle medication.  All of the patient's questions and concerns were addressed. Otezla Pregnancy And Lactation Text: This medication is Pregnancy Category C and it isn't known if it is safe during pregnancy. It is unknown if it is excreted in breast milk. Rhofade Counseling: Rhofade is a topical medication which can decrease superficial blood flow where applied. Side effects are uncommon and include stinging, redness and allergic reactions. Colchicine Counseling:  Patient counseled regarding adverse effects including but not limited to stomach upset (nausea, vomiting, stomach pain, or diarrhea).  Patient instructed to limit alcohol consumption while taking this medication.  Colchicine may reduce blood counts especially with prolonged use.  The patient understands that monitoring of kidney function and blood counts may be required, especially at baseline. The patient verbalized understanding of the proper use and possible adverse effects of colchicine.  All of the patient's questions and concerns were addressed. Thalidomide Counseling: I discussed with the patient the risks of thalidomide including but not limited to birth defects, anxiety, weakness, chest pain, dizziness, cough and severe allergy. Topical Sulfur Applications Pregnancy And Lactation Text: This medication is considered safe during pregnancy and breast feeding secondary to limited systemic absorption. Bimzelx Pregnancy And Lactation Text: This medication crosses the placenta and the safety is uncertain during pregnancy. It is unknown if this medication is present in breast milk. Metronidazole Counseling:  I discussed with the patient the risks of metronidazole including but not limited to seizures, nausea/vomiting, a metallic taste in the mouth, nausea/vomiting and severe allergy. Dutasteride Female Counseling: Dutasteride Counseling:  I discussed with the patient the risks of use of dutasteride including but not limited to decreased libido and sexual dysfunction. Explained the teratogenic nature of the medication and stressed the importance of not getting pregnant during treatment. All of the patient's questions and concerns were addressed. Minoxidil Pregnancy And Lactation Text: This medication has not been assigned a Pregnancy Risk Category but animal studies failed to show danger with the topical medication. It is unknown if the medication is excreted in breast milk. Oxybutynin Counseling:  I discussed with the patient the risks of oxybutynin including but not limited to skin rash, drowsiness, dry mouth, difficulty urinating, and blurred vision. Azathioprine Counseling:  I discussed with the patient the risks of azathioprine including but not limited to myelosuppression, immunosuppression, hepatotoxicity, lymphoma, and infections.  The patient understands that monitoring is required including baseline LFTs, Creatinine, possible TPMP genotyping and weekly CBCs for the first month and then every 2 weeks thereafter.  The patient verbalized understanding of the proper use and possible adverse effects of azathioprine.  All of the patient's questions and concerns were addressed. Calcipotriene Counseling:  I discussed with the patient the risks of calcipotriene including but not limited to erythema, scaling, itching, and irritation. Spevigo Counseling: I discussed with the patient the risks of Spevigo including but not limited to fatigue, nasuea, vomiting, headache, pruritus, urinary tract infection, an infusion related reactions.  The patient understands that monitoring is required including screening for tuberculosis at baseline and yearly screening thereafter while continuing Spevigo therapy. They should contact us if symptoms of infection or other concerning signs are noted. Rituxan Pregnancy And Lactation Text: This medication is Pregnancy Category C and it isn't know if it is safe during pregnancy. It is unknown if this medication is excreted in breast milk but similar antibodies are known to be excreted. Low Dose Naltrexone Pregnancy And Lactation Text: Naltrexone is pregnancy category C.  There have been no adequate and well-controlled studies in pregnant women.  It should be used in pregnancy only if the potential benefit justifies the potential risk to the fetus.   Limited data indicates that naltrexone is minimally excreted into breastmilk. Include Pregnancy/Lactation Warning?: No Prednisone Counseling:  I discussed with the patient the risks of prolonged use of prednisone including but not limited to weight gain, insomnia, osteoporosis, mood changes, diabetes, susceptibility to infection, glaucoma and high blood pressure.  In cases where prednisone use is prolonged, patients should be monitored with blood pressure checks, serum glucose levels and an eye exam.  Additionally, the patient may need to be placed on GI prophylaxis, PCP prophylaxis, and calcium and vitamin D supplementation and/or a bisphosphonate.  The patient verbalized understanding of the proper use and the possible adverse effects of prednisone.  All of the patient's questions and concerns were addressed. Cephalexin Counseling: I counseled the patient regarding use of cephalexin as an antibiotic for prophylactic and/or therapeutic purposes. Cephalexin (commonly prescribed under brand name Keflex) is a cephalosporin antibiotic which is active against numerous classes of bacteria, including most skin bacteria. Side effects may include nausea, diarrhea, gastrointestinal upset, rash, hives, yeast infections, and in rare cases, hepatitis, kidney disease, seizures, fever, confusion, neurologic symptoms, and others. Patients with severe allergies to penicillin medications are cautioned that there is about a 10% incidence of cross-reactivity with cephalosporins. When possible, patients with penicillin allergies should use alternatives to cephalosporins for antibiotic therapy. Acitretin Pregnancy And Lactation Text: This medication is Pregnancy Category X and should not be given to women who are pregnant or may become pregnant in the future. This medication is excreted in breast milk. Humira Counseling:  I discussed with the patient the risks of adalimumab including but not limited to myelosuppression, immunosuppression, autoimmune hepatitis, demyelinating diseases, lymphoma, and serious infections.  The patient understands that monitoring is required including a PPD at baseline and must alert us or the primary physician if symptoms of infection or other concerning signs are noted. Saxenda Counseling: I reviewed the possible side effects including: thyroid tumors, kidney disease, gallbladder disease, abdominal pain, constipation, diarrhea, nausea, vomiting and pancreatitis. Do not take this medication if you have a history or family history of multiple endocrine neoplasia syndrome type 2. Side effects reviewed, pt to contact office should one occur. Nsaids Counseling: NSAID Counseling: I discussed with the patient that NSAIDs should be taken with food. Prolonged use of NSAIDs can result in the development of stomach ulcers.  Patient advised to stop taking NSAIDs if abdominal pain occurs.  The patient verbalized understanding of the proper use and possible adverse effects of NSAIDs.  All of the patient's questions and concerns were addressed. Siliq Counseling:  I discussed with the patient the risks of Siliq including but not limited to new or worsening depression, suicidal thoughts and behavior, immunosuppression, malignancy, posterior leukoencephalopathy syndrome, and serious infections.  The patient understands that monitoring is required including a PPD at baseline and must alert us or the primary physician if symptoms of infection or other concerning signs are noted. There is also a special program designed to monitor depression which is required with Siliq. Aklief counseling:  Patient advised to apply a pea-sized amount only at bedtime and wait 30 minutes after washing their face before applying.  If too drying, patient may add a non-comedogenic moisturizer.  The most commonly reported side effects including irritation, redness, scaling, dryness, stinging, burning, itching, and increased risk of sunburn.  The patient verbalized understanding of the proper use and possible adverse effects of retinoids.  All of the patient's questions and concerns were addressed. Terbinafine Counseling: Patient counseling regarding adverse effects of terbinafine including but not limited to headache, diarrhea, rash, upset stomach, liver function test abnormalities, itching, taste/smell disturbance, nausea, abdominal pain, and flatulence.  There is a rare possibility of liver failure that can occur when taking terbinafine.  The patient understands that a baseline LFT and kidney function test may be required. The patient verbalized understanding of the proper use and possible adverse effects of terbinafine.  All of the patient's questions and concerns were addressed. Topical Ketoconazole Counseling: Patient counseled that this medication may cause skin irritation or allergic reactions.  In the event of skin irritation, the patient was advised to reduce the amount of the drug applied or use it less frequently.   The patient verbalized understanding of the proper use and possible adverse effects of ketoconazole.  All of the patient's questions and concerns were addressed. Cephalexin Pregnancy And Lactation Text: This medication is Pregnancy Category B and considered safe during pregnancy.  It is also excreted in breast milk but can be used safely for shorter doses. Prednisone Pregnancy And Lactation Text: This medication is Pregnancy Category C and it isn't know if it is safe during pregnancy. This medication is excreted in breast milk. Hydroquinone Counseling:  Patient advised that medication may result in skin irritation, lightening (hypopigmentation), dryness, and burning.  In the event of skin irritation, the patient was advised to reduce the amount of the drug applied or use it less frequently.  Rarely, spots that are treated with hydroquinone can become darker (pseudoochronosis).  Should this occur, patient instructed to stop medication and call the office. The patient verbalized understanding of the proper use and possible adverse effects of hydroquinone.  All of the patient's questions and concerns were addressed. Bexarotene Counseling:  I discussed with the patient the risks of bexarotene including but not limited to hair loss, dry lips/skin/eyes, liver abnormalities, hyperlipidemia, pancreatitis, depression/suicidal ideation, photosensitivity, drug rash/allergic reactions, hypothyroidism, anemia, leukopenia, infection, cataracts, and teratogenicity.  Patient understands that they will need regular blood tests to check lipid profile, liver function tests, white blood cell count, thyroid function tests and pregnancy test if applicable. Nsaids Pregnancy And Lactation Text: These medications are considered safe up to 30 weeks gestation. It is excreted in breast milk. Aklief Pregnancy And Lactation Text: It is unknown if this medication is safe to use during pregnancy.  It is unknown if this medication is excreted in breast milk.  Breastfeeding women should use the topical cream on the smallest area of the skin for the shortest time needed while breastfeeding.  Do not apply to nipple and areola. Clindamycin Counseling: I counseled the patient regarding use of clindamycin as an antibiotic for prophylactic and/or therapeutic purposes. Clindamycin is active against numerous classes of bacteria, including skin bacteria. Side effects may include nausea, diarrhea, gastrointestinal upset, rash, hives, yeast infections, and in rare cases, colitis. Cantharidin Pregnancy And Lactation Text: This medication has not been proven safe during pregnancy. It is unknown if this medication is excreted in breast milk. Semaglutide Counseling: I reviewed the possible side effects including: thyroid tumors, kidney disease, gallbladder disease, abdominal pain, constipation, diarrhea, nausea, vomiting and pancreatitis. Do not take this medication if you have a history or family history of multiple endocrine neoplasia syndrome type 2. Side effects reviewed, pt to contact office should one occur. Bexarotene Pregnancy And Lactation Text: This medication is Pregnancy Category X and should not be given to women who are pregnant or may become pregnant. This medication should not be used if you are breast feeding. Olanzapine Counseling- I discussed with the patient the common side effects of olanzapine including but are not limited to: lack of energy, dry mouth, increased appetite, sleepiness, tremor, constipation, dizziness, changes in behavior, or restlessness.  Explained that teenagers are more likely to experience headaches, abdominal pain, pain in the arms or legs, tiredness, and sleepiness.  Serious side effects include but are not limited: increased risk of death in elderly patients who are confused, have memory loss, or dementia-related psychosis; hyperglycemia; increased cholesterol and triglycerides; and weight gain. Azelaic Acid Counseling: Patient counseled that medicine may cause skin irritation and to avoid applying near the eyes.  In the event of skin irritation, the patient was advised to reduce the amount of the drug applied or use it less frequently.   The patient verbalized understanding of the proper use and possible adverse effects of azelaic acid.  All of the patient's questions and concerns were addressed. Simlandi Counseling:  I discussed with the patient the risks of adalimumab including but not limited to myelosuppression, immunosuppression, autoimmune hepatitis, demyelinating diseases, lymphoma, and serious infections.  The patient understands that monitoring is required including a PPD at baseline and must alert us or the primary physician if symptoms of infection or other concerning signs are noted. Clindamycin Pregnancy And Lactation Text: This medication can be used in pregnancy if certain situations. Clindamycin is also present in breast milk. Topical Metronidazole Counseling: Metronidazole is a topical antibiotic medication. You may experience burning, stinging, redness, or allergic reactions.  Please call our office if you develop any problems from using this medication. Imiquimod Counseling:  I discussed with the patient the risks of imiquimod including but not limited to erythema, scaling, itching, weeping, crusting, and pain.  Patient understands that the inflammatory response to imiquimod is variable from person to person and was educated regarded proper titration schedule.  If flu-like symptoms develop, patient knows to discontinue the medication and contact us. Isotretinoin Counseling: Patient should get monthly blood tests, not donate blood, not drive at night if vision affected, not share medication, and not undergo elective surgery for 6 months after tx completed. Side effects reviewed, pt to contact office should one occur. Olanzapine Pregnancy And Lactation Text: This medication is pregnancy category C.   There are no adequate and well controlled trials with olanzapine in pregnant females.  Olanzapine should be used during pregnancy only if the potential benefit justifies the potential risk to the fetus.   In a study in lactating healthy women, olanzapine was excreted in breast milk.  It is recommended that women taking olanzapine should not breast feed. Arava Counseling:  Patient counseled regarding adverse effects of Arava including but not limited to nausea, vomiting, abnormalities in liver function tests. Patients may develop mouth sores, rash, diarrhea, and abnormalities in blood counts. The patient understands that monitoring is required including LFTs and blood counts.  There is a rare possibility of scarring of the liver and lung problems that can occur when taking methotrexate. Persistent nausea, loss of appetite, pale stools, dark urine, cough, and shortness of breath should be reported immediately. Patient advised to discontinue Arava treatment and consult with a physician prior to attempting conception. The patient will have to undergo a treatment to eliminate Arava from the body prior to conception. Azelaic Acid Pregnancy And Lactation Text: This medication is considered safe during pregnancy and breast feeding. Doxycycline Counseling:  Patient counseled regarding possible photosensitivity and increased risk for sunburn.  Patient instructed to avoid sunlight, if possible.  When exposed to sunlight, patients should wear protective clothing, sunglasses, and sunscreen.  The patient was instructed to call the office immediately if the following severe adverse effects occur:  hearing changes, easy bruising/bleeding, severe headache, or vision changes.  The patient verbalized understanding of the proper use and possible adverse effects of doxycycline.  All of the patient's questions and concerns were addressed. Topical Metronidazole Pregnancy And Lactation Text: This medication is Pregnancy Category B and considered safe during pregnancy.  It is also considered safe to use while breastfeeding. Isotretinoin Pregnancy And Lactation Text: This medication is Pregnancy Category X and is considered extremely dangerous during pregnancy. It is unknown if it is excreted in breast milk. Imiquimod Pregnancy And Lactation Text: This medication is Pregnancy Category C. It is unknown if this medication is excreted in breast milk. Litfulo Counseling: I discussed with the patient the risks of Litfulo therapy including but not limited to upper respiratory tract infections, shingles, cold sores, and nausea. Live vaccines should be avoided.  This medication has been linked to serious infections; higher rate of mortality; malignancy and lymphoproliferative disorders; major adverse cardiovascular events; thrombosis; gastrointestinal perforations; neutropenia; lymphopenia; anemia; liver enzyme elevations; and lipid elevations. Drysol Counseling:  I discussed with the patient the risks of drysol/aluminum chloride including but not limited to skin rash, itching, irritation, burning. Vtama Pregnancy And Lactation Text: It is unknown if this medication can cause problems during pregnancy and breastfeeding. Odomzo Counseling- I discussed with the patient the risks of Odomzo including but not limited to nausea, vomiting, diarrhea, constipation, weight loss, changes in the sense of taste, decreased appetite, muscle spasms, and hair loss.  The patient verbalized understanding of the proper use and possible adverse effects of Odomzo.  All of the patient's questions and concerns were addressed. Xeltraecyz Pregnancy And Lactation Text: This medication is Pregnancy Category D and is not considered safe during pregnancy.  The risk during breast feeding is also uncertain. Rifampin Counseling: I discussed with the patient the risks of rifampin including but not limited to liver damage, kidney damage, red-orange body fluids, nausea/vomiting and severe allergy. Dupixent Pregnancy And Lactation Text: This medication likely crosses the placenta but the risk for the fetus is uncertain. This medication is excreted in breast milk. Birth Control Pills Pregnancy And Lactation Text: This medication should be avoided if pregnant and for the first 30 days post-partum. Doxepin Counseling:  Patient advised that the medication is sedating and not to drive a car after taking this medication. Patient informed of potential adverse effects including but not limited to dry mouth, urinary retention, and blurry vision.  The patient verbalized understanding of the proper use and possible adverse effects of doxepin.  All of the patient's questions and concerns were addressed. Griseofulvin Pregnancy And Lactation Text: This medication is Pregnancy Category X and is known to cause serious birth defects. It is unknown if this medication is excreted in breast milk but breast feeding should be avoided. Cyclosporine Counseling:  I discussed with the patient the risks of cyclosporine including but not limited to hypertension, gingival hyperplasia,myelosuppression, immunosuppression, liver damage, kidney damage, neurotoxicity, lymphoma, and serious infections. The patient understands that monitoring is required including baseline blood pressure, CBC, CMP, lipid panel and uric acid, and then 1-2 times monthly CMP and blood pressure. Glycopyrrolate Pregnancy And Lactation Text: This medication is Pregnancy Category B and is considered safe during pregnancy. It is unknown if it is excreted breast milk. Azithromycin Counseling:  I discussed with the patient the risks of azithromycin including but not limited to GI upset, allergic reaction, drug rash, diarrhea, and yeast infections. Litfulo Pregnancy And Lactation Text: Based on animal studies, Lifulo may cause embryo-fetal harm when administered to pregnant women.  The medication should not be used in pregnancy.  Breastfeeding is not recommended during treatment. Tremfya Counseling: I discussed with the patient the risks of guselkumab including but not limited to immunosuppression, serious infections, and drug reactions.  The patient understands that monitoring is required including a PPD at baseline and must alert us or the primary physician if symptoms of infection or other concerning signs are noted. Ebglyss Counseling: I discussed with the patient the risks of lebrikizumab including but not limited to eye inflammation and irritation, cold sores, injection site reactions, allergic reactions and increased risk of parasitic infection. The patient understands that monitoring is required and they must alert us or the primary physician if symptoms of infection or other concerning signs are noted. Rifampin Pregnancy And Lactation Text: This medication is Pregnancy Category C and it isn't know if it is safe during pregnancy. It is also excreted in breast milk and should not be used if you are breast feeding. Zoryve Counseling:  I discussed with the patient that Zoryve is not for use in the eyes, mouth or vagina. The most commonly reported side effects include diarrhea, headache, insomnia, application site pain, upper respiratory tract infections, and urinary tract infections.  All of the patient's questions and concerns were addressed. Spironolactone Counseling: Patient advised regarding risks of diarrhea, abdominal pain, hyperkalemia, birth defects (for female patients), liver toxicity and renal toxicity. The patient may need blood work to monitor liver and kidney function and potassium levels while on therapy. The patient verbalized understanding of the proper use and possible adverse effects of spironolactone.  All of the patient's questions and concerns were addressed. Protopic Counseling: Patient may experience a mild burning sensation during topical application. Protopic is not approved in children less than 2 years of age. There have been case reports of hematologic and skin malignancies in patients using topical calcineurin inhibitors although causality is questionable. Itraconazole Counseling:  I discussed with the patient the risks of itraconazole including but not limited to liver damage, nausea/vomiting, neuropathy, and severe allergy.  The patient understands that this medication is best absorbed when taken with acidic beverages such as non-diet cola or ginger ale.  The patient understands that monitoring is required including baseline LFTs and repeat LFTs at intervals.  The patient understands that they are to contact us or the primary physician if concerning signs are noted. Nemluvio Counseling: I discussed with the patient the risks of nemolizumab including but not limited to headache, gastrointestinal complaints, nasopharyngitis, musculoskeletal complaints, injection site reactions, and allergic reactions. The patient understands that monitoring is required and they must alert us or the primary physician if any side effects are noted. Doxepin Pregnancy And Lactation Text: This medication is Pregnancy Category C and it isn't known if it is safe during pregnancy. It is also excreted in breast milk and breast feeding isn't recommended. Hydroxychloroquine Counseling:  I discussed with the patient that a baseline ophthalmologic exam is needed at the start of therapy and every year thereafter while on therapy. A CBC may also be warranted for monitoring.  The side effects of this medication were discussed with the patient, including but not limited to agranulocytosis, aplastic anemia, seizures, rashes, retinopathy, and liver toxicity. Patient instructed to call the office should any adverse effect occur.  The patient verbalized understanding of the proper use and possible adverse effects of Plaquenil.  All the patient's questions and concerns were addressed. Azithromycin Pregnancy And Lactation Text: This medication is considered safe during pregnancy and is also secreted in breast milk. Tazorac Counseling:  Patient advised that medication is irritating and drying.  Patient may need to apply sparingly and wash off after an hour before eventually leaving it on overnight.  The patient verbalized understanding of the proper use and possible adverse effects of tazorac.  All of the patient's questions and concerns were addressed. Elidel Counseling: Patient may experience a mild burning sensation during topical application. Elidel is not approved in children less than 2 years of age. There have been case reports of hematologic and skin malignancies in patients using topical calcineurin inhibitors although causality is questionable. Olumiant Counseling: I discussed with the patient the risks of Olumiant therapy including but not limited to upper respiratory tract infections, shingles, cold sores, and nausea. Live vaccines should be avoided.  This medication has been linked to serious infections; higher rate of mortality; malignancy and lymphoproliferative disorders; major adverse cardiovascular events; thrombosis; gastrointestinal perforations; neutropenia; lymphopenia; anemia; liver enzyme elevations; and lipid elevations. Ebglyss Pregnancy And Lactation Text: This medication likely crosses the placenta but the risk for the fetus is uncertain. It is unknown if this medication is excreted in breast milk. Sarecycline Counseling: Patient advised regarding possible photosensitivity and discoloration of the teeth, skin, lips, tongue and gums.  Patient instructed to avoid sunlight, if possible.  When exposed to sunlight, patients should wear protective clothing, sunglasses, and sunscreen.  The patient was instructed to call the office immediately if the following severe adverse effects occur:  hearing changes, easy bruising/bleeding, severe headache, or vision changes.  The patient verbalized understanding of the proper use and possible adverse effects of sarecycline.  All of the patient's questions and concerns were addressed. Protopic Pregnancy And Lactation Text: This medication is Pregnancy Category C. It is unknown if this medication is excreted in breast milk when applied topically. Hydroxychloroquine Pregnancy And Lactation Text: This medication has been shown to cause fetal harm but it isn't assigned a Pregnancy Risk Category. There are small amounts excreted in breast milk. Nemluvio Pregnancy And Lactation Text: It is not known if Nemluvio causes fetal harm or is present in breast milk. Please proceed with caution if patients who are pregnant or breastfeeding. Spironolactone Pregnancy And Lactation Text: This medication can cause feminization of the male fetus and should be avoided during pregnancy. The active metabolite is also found in breast milk. Methotrexate Counseling:  Patient counseled regarding adverse effects of methotrexate including but not limited to nausea, vomiting, abnormalities in liver function tests. Patients may develop mouth sores, rash, diarrhea, and abnormalities in blood counts. The patient understands that monitoring is required including LFT's and blood counts.  There is a rare possibility of scarring of the liver and lung problems that can occur when taking methotrexate. Persistent nausea, loss of appetite, pale stools, dark urine, cough, and shortness of breath should be reported immediately. Patient advised to discontinue methotrexate treatment at least three months before attempting to become pregnant.  I discussed the need for folate supplements while taking methotrexate.  These supplements can decrease side effects during methotrexate treatment. The patient verbalized understanding of the proper use and possible adverse effects of methotrexate.  All of the patient's questions and concerns were addressed. Bactrim Counseling:  I discussed with the patient the risks of sulfa antibiotics including but not limited to GI upset, allergic reaction, drug rash, diarrhea, dizziness, photosensitivity, and yeast infections.  Rarely, more serious reactions can occur including but not limited to aplastic anemia, agranulocytosis, methemoglobinemia, blood dyscrasias, liver or kidney failure, lung infiltrates or desquamative/blistering drug rashes. Hydroxyzine Counseling: Patient advised that the medication is sedating and not to drive a car after taking this medication.  Patient informed of potential adverse effects including but not limited to dry mouth, urinary retention, and blurry vision.  The patient verbalized understanding of the proper use and possible adverse effects of hydroxyzine.  All of the patient's questions and concerns were addressed. Tazorac Pregnancy And Lactation Text: This medication is not safe during pregnancy. It is unknown if this medication is excreted in breast milk. Xolair Counseling:  Patient informed of potential adverse effects including but not limited to fever, muscle aches, rash and allergic reactions.  The patient verbalized understanding of the proper use and possible adverse effects of Xolair.  All of the patient's questions and concerns were addressed. Olumiant Pregnancy And Lactation Text: Based on animal studies, Olumiant may cause embryo-fetal harm when administered to pregnant women.  The medication should not be used in pregnancy.  Breastfeeding is not recommended during treatment. Zyclara Counseling:  I discussed with the patient the risks of imiquimod including but not limited to erythema, scaling, itching, weeping, crusting, and pain.  Patient understands that the inflammatory response to imiquimod is variable from person to person and was educated regarded proper titration schedule.  If flu-like symptoms develop, patient knows to discontinue the medication and contact us. Enbrel Counseling:  I discussed with the patient the risks of etanercept including but not limited to myelosuppression, immunosuppression, autoimmune hepatitis, demyelinating diseases, lymphoma, and infections.  The patient understands that monitoring is required including a PPD at baseline and must alert us or the primary physician if symptoms of infection or other concerning signs are noted. Qbrexza Counseling:  I discussed with the patient the risks of Qbrexza including but not limited to headache, mydriasis, blurred vision, dry eyes, nasal dryness, dry mouth, dry throat, dry skin, urinary hesitation, and constipation.  Local skin reactions including erythema, burning, stinging, and itching can also occur. Niacinamide Counseling: I recommended taking niacin or niacinamide, also know as vitamin B3, twice daily. Recent evidence suggests that taking vitamin B3 (500 mg twice daily) can reduce the risk of actinic keratoses and non-melanoma skin cancers. Side effects of vitamin B3 include flushing and headache. Ozempic Counseling: I reviewed the possible side effects including: thyroid tumors, kidney disease, gallbladder disease, abdominal pain, constipation, diarrhea, nausea, vomiting and pancreatitis. Do not take this medication if you have a history or family history of multiple endocrine neoplasia syndrome type 2. Side effects reviewed, pt to contact office should one occur. Rituxan Counseling:  I discussed with the patient the risks of Rituxan infusions. Side effects can include infusion reactions, severe drug rashes including mucocutaneous reactions, reactivation of latent hepatitis and other infections and rarely progressive multifocal leukoencephalopathy.  All of the patient's questions and concerns were addressed. Low Dose Naltrexone Counseling- I discussed with the patient the potential risks and side effects of low dose naltrexone including but not limited to: more vivid dreams, headaches, nausea, vomiting, abdominal pain, fatigue, dizziness, and anxiety. Ketoconazole Counseling:   Patient counseled regarding improving absorption with orange juice.  Adverse effects include but are not limited to breast enlargement, headache, diarrhea, nausea, upset stomach, liver function test abnormalities, taste disturbance, and stomach pain.  There is a rare possibility of liver failure that can occur when taking ketoconazole. The patient understands that monitoring of LFTs may be required, especially at baseline. The patient verbalized understanding of the proper use and possible adverse effects of ketoconazole.  All of the patient's questions and concerns were addressed. Methotrexate Pregnancy And Lactation Text: This medication is Pregnancy Category X and is known to cause fetal harm. This medication is excreted in breast milk. Detail Level: Simple Topical Clindamycin Counseling: Patient counseled that this medication may cause skin irritation or allergic reactions.  In the event of skin irritation, the patient was advised to reduce the amount of the drug applied or use it less frequently.   The patient verbalized understanding of the proper use and possible adverse effects of clindamycin.  All of the patient's questions and concerns were addressed. Bactrim Pregnancy And Lactation Text: This medication is Pregnancy Category D and is known to cause fetal risk.  It is also excreted in breast milk. Hydroxyzine Pregnancy And Lactation Text: This medication is not safe during pregnancy and should not be taken. It is also excreted in breast milk and breast feeding isn't recommended. Acitretin Counseling:  I discussed with the patient the risks of acitretin including but not limited to hair loss, dry lips/skin/eyes, liver damage, hyperlipidemia, depression/suicidal ideation, photosensitivity.  Serious rare side effects can include but are not limited to pancreatitis, pseudotumor cerebri, bony changes, clot formation/stroke/heart attack.  Patient understands that alcohol is contraindicated since it can result in liver toxicity and significantly prolong the elimination of the drug by many years. Eucrisa Counseling: Patient may experience a mild burning sensation during topical application. Eucrisa is not approved in children less than 3 months of age. Xolair Pregnancy And Lactation Text: This medication is Pregnancy Category B and is considered safe during pregnancy. This medication is excreted in breast milk. Niacinamide Pregnancy And Lactation Text: These medications are considered safe during pregnancy. Ketoconazole Pregnancy And Lactation Text: This medication is Pregnancy Category C and it isn't know if it is safe during pregnancy. It is also excreted in breast milk and breast feeding isn't recommended. Tetracycline Counseling: Patient counseled regarding possible photosensitivity and increased risk for sunburn.  Patient instructed to avoid sunlight, if possible.  When exposed to sunlight, patients should wear protective clothing, sunglasses, and sunscreen.  The patient was instructed to call the office immediately if the following severe adverse effects occur:  hearing changes, easy bruising/bleeding, severe headache, or vision changes.  The patient verbalized understanding of the proper use and possible adverse effects of tetracycline.  All of the patient's questions and concerns were addressed. Patient understands to avoid pregnancy while on therapy due to potential birth defects. Qbrexza Pregnancy And Lactation Text: There is no available data on Qbrexza use in pregnant women.  There is no available data on Qbrexza use in lactation.

## 2025-03-10 ENCOUNTER — APPOINTMENT (OUTPATIENT)
Dept: CARDIOLOGY | Facility: CLINIC | Age: 85
End: 2025-03-10
Payer: MEDICARE

## 2025-03-24 ENCOUNTER — HOSPITAL ENCOUNTER (OUTPATIENT)
Dept: CARDIOLOGY | Facility: CLINIC | Age: 85
Discharge: HOME | End: 2025-03-24
Payer: MEDICARE

## 2025-03-24 ENCOUNTER — OFFICE VISIT (OUTPATIENT)
Dept: CARDIOLOGY | Facility: CLINIC | Age: 85
End: 2025-03-24
Payer: MEDICARE

## 2025-03-24 VITALS — HEART RATE: 70 BPM | SYSTOLIC BLOOD PRESSURE: 138 MMHG | DIASTOLIC BLOOD PRESSURE: 72 MMHG

## 2025-03-24 DIAGNOSIS — I35.0 NONRHEUMATIC AORTIC VALVE STENOSIS: ICD-10-CM

## 2025-03-24 DIAGNOSIS — E78.2 MIXED HYPERLIPIDEMIA: ICD-10-CM

## 2025-03-24 DIAGNOSIS — I25.10 CORONARY ARTERY DISEASE INVOLVING NATIVE CORONARY ARTERY OF NATIVE HEART WITHOUT ANGINA PECTORIS: ICD-10-CM

## 2025-03-24 DIAGNOSIS — I10 BENIGN ESSENTIAL HYPERTENSION: ICD-10-CM

## 2025-03-24 DIAGNOSIS — I35.0 NONRHEUMATIC AORTIC VALVE STENOSIS: Primary | ICD-10-CM

## 2025-03-24 PROCEDURE — 3075F SYST BP GE 130 - 139MM HG: CPT | Performed by: NURSE PRACTITIONER

## 2025-03-24 PROCEDURE — 99214 OFFICE O/P EST MOD 30 MIN: CPT | Mod: 25 | Performed by: NURSE PRACTITIONER

## 2025-03-24 PROCEDURE — 1160F RVW MEDS BY RX/DR IN RCRD: CPT | Performed by: NURSE PRACTITIONER

## 2025-03-24 PROCEDURE — 1036F TOBACCO NON-USER: CPT | Performed by: NURSE PRACTITIONER

## 2025-03-24 PROCEDURE — 99214 OFFICE O/P EST MOD 30 MIN: CPT | Performed by: NURSE PRACTITIONER

## 2025-03-24 PROCEDURE — 1123F ACP DISCUSS/DSCN MKR DOCD: CPT | Performed by: NURSE PRACTITIONER

## 2025-03-24 PROCEDURE — 93306 TTE W/DOPPLER COMPLETE: CPT | Performed by: INTERNAL MEDICINE

## 2025-03-24 PROCEDURE — 3078F DIAST BP <80 MM HG: CPT | Performed by: NURSE PRACTITIONER

## 2025-03-24 PROCEDURE — 1159F MED LIST DOCD IN RCRD: CPT | Performed by: NURSE PRACTITIONER

## 2025-03-24 PROCEDURE — 93306 TTE W/DOPPLER COMPLETE: CPT

## 2025-03-24 ASSESSMENT — ENCOUNTER SYMPTOMS
LOSS OF SENSATION IN FEET: 0
OCCASIONAL FEELINGS OF UNSTEADINESS: 0
DEPRESSION: 0

## 2025-03-24 NOTE — PROGRESS NOTES
Subjective   Shanika Diaz is a 84 y.o. female.    Chief Complaint:  Follow-up, Hypertension, Hyperlipidemia, Coronary Artery Disease, and Valve Disorder    Mrs. Diaz returns for a routine follow up. She had a repeat echocardiogram prior to our visit today. She has been feeling well from a cardiac standpoint. She has remained compliant with her medications, denying any intolerances. She continues to complain of getting fatigued easily. She also still has some intermittent dizziness, though denies any clear syncope. She denies any recent ER visits or hospitalizations. She offers no other cardiovascular complaints or concerns today. She denies any complaints of chest pain, shortness of breath, palpitations, syncope, orthopnea, paroxysmal nocturnal dyspnea, lower extremity swelling or bleeding concerns.          Review of Systems   All other systems reviewed and are negative.      Objective   Physical Exam  Constitutional:       Appearance: Healthy appearance. In no distress  Pulmonary:      Effort: Pulmonary effort is normal.      Breath sounds: Normal breath sounds.   Cardiovascular:      Normal rate. Regular rhythm. Normal S1. Normal S2.       Murmurs: There is 3/6 systolic murmur.      Carotids: right carotid pulse +2, no bruit heard over the right carotid. left carotid pulse +2, no bruit heard over the left carotid.  Edema:     Peripheral edema absent.   Abdominal:      Palpations: Abdomen is soft.   Musculoskeletal:       Cervical back: Normal range of motion.   Skin:     General: Skin is warm and dry. Normal color and pigmentation   Neurological:      Mental Status: Alert and oriented to person, place and time.   Psychiatric:     Mood and Affect: appropriate mood and appropriate affect.     Lab Review:   Lab Results   Component Value Date     01/16/2025    K 4.4 01/16/2025     01/16/2025    CO2 23 01/16/2025    BUN 18 01/16/2025    CREATININE 1.18 (H) 01/16/2025    GLUCOSE 88 01/16/2025     CALCIUM 9.5 01/16/2025     Lab Results   Component Value Date    WBC 7.1 01/16/2025    HGB 12.6 01/16/2025    HCT 39.0 01/16/2025    MCV 91 01/16/2025     01/16/2025     Lab Results   Component Value Date    CHOL 179 10/26/2022    TRIG 120 10/26/2022    HDL 75.9 10/26/2022       Assessment/Plan   Mrs. Diaz is a pleasant 84 year old  female with a past medical history significant for hypertension, hyperlipidemia, OTTO, aortic stenosis and CAD s/p mid LAD PCI and LADD1 PTCA in 2016. Heart catheterization 1/2023 showed a widely patent mid LAD stent with moderate AS. Echocardiogram 3/2024 showed an EF of 65% with trace MR and moderate aortic stenosis with a peak gradient of 43.8, mean 17.4. She presents today for routine follow up stable from a cardiac standpoint. Her VS remain stable. I will have her continue all medications unchanged. I will have her obtain a FLP with AST and ALT at her convenience. I will call her with results once available and make further recommendations at that time. I will also call her with her echo results. She was encouraged to try and stay active. She will follow up with us in clinic in 6 months. She knows to call for any concerns.

## 2025-03-26 LAB
AORTIC VALVE MEAN GRADIENT: 32 MMHG
AORTIC VALVE PEAK VELOCITY: 3.56 M/S
AV PEAK GRADIENT: 51 MMHG
AVA (PEAK VEL): 0.95 CM2
AVA (VTI): 1.09 CM2
EJECTION FRACTION APICAL 4 CHAMBER: 60.4
EJECTION FRACTION: 63 %
LEFT ATRIUM VOLUME AREA LENGTH INDEX BSA: 37.7 ML/M2
LEFT VENTRICLE INTERNAL DIMENSION DIASTOLE: 4.48 CM (ref 3.5–6)
LEFT VENTRICULAR OUTFLOW TRACT DIAMETER: 1.91 CM
MITRAL VALVE E/A RATIO: 0.75
RIGHT VENTRICLE FREE WALL PEAK S': 12.23 CM/S
TRICUSPID ANNULAR PLANE SYSTOLIC EXCURSION: 2.3 CM

## 2025-03-29 LAB
ALT SERPL-CCNC: 14 U/L (ref 6–29)
AST SERPL-CCNC: 14 U/L (ref 10–35)
CHOLEST SERPL-MCNC: 175 MG/DL
CHOLEST/HDLC SERPL: 2.3 (CALC)
HDLC SERPL-MCNC: 77 MG/DL
LDLC SERPL CALC-MCNC: 79 MG/DL (CALC)
NONHDLC SERPL-MCNC: 98 MG/DL (CALC)
TRIGL SERPL-MCNC: 105 MG/DL

## 2025-04-29 ENCOUNTER — TELEPHONE (OUTPATIENT)
Dept: CARDIOLOGY | Facility: CLINIC | Age: 85
End: 2025-04-29
Payer: MEDICARE

## 2025-04-29 NOTE — TELEPHONE ENCOUNTER
I spoke to patient. She will stop rosuvastatin for 2 weeks and call with an update on her symptoms. Should pain/muscle aches be better, we discussed trying a PCSK9 inhibitor or leqvio. If she still has pain, we will restart rosuvastatin and she will work with her PCP to find an alternative cause for her pain.

## 2025-05-14 ENCOUNTER — TELEPHONE (OUTPATIENT)
Dept: CARDIOLOGY | Facility: CLINIC | Age: 85
End: 2025-05-14
Payer: MEDICARE

## 2025-05-16 DIAGNOSIS — I25.10 CORONARY ARTERY DISEASE INVOLVING NATIVE CORONARY ARTERY OF NATIVE HEART WITHOUT ANGINA PECTORIS: Primary | ICD-10-CM

## 2025-05-16 NOTE — TELEPHONE ENCOUNTER
I spoke to patient. She is feeling much better of rosuvastatin. We discussed trying a PCSK9 inhibitor or inclisarin. She would like to think it over and get back to us. We may even try low dose atorvastatin first, though she is concerned she may have the same side effects. I will put in an order to our clinical pharmacist to help see what her insurance plan will cover. She knows to call for any concerns.

## 2025-05-19 ENCOUNTER — TELEPHONE (OUTPATIENT)
Dept: CARDIOLOGY | Facility: CLINIC | Age: 85
End: 2025-05-19
Payer: MEDICARE

## 2025-05-19 DIAGNOSIS — I25.10 CORONARY ARTERY DISEASE INVOLVING NATIVE CORONARY ARTERY OF NATIVE HEART WITHOUT ANGINA PECTORIS: Primary | ICD-10-CM

## 2025-05-19 DIAGNOSIS — E78.2 COMBINED HYPERLIPIDEMIA: ICD-10-CM

## 2025-05-19 RX ORDER — ATORVASTATIN CALCIUM 10 MG/1
10 TABLET, FILM COATED ORAL DAILY
Qty: 30 TABLET | Refills: 11 | Status: SHIPPED | OUTPATIENT
Start: 2025-05-19 | End: 2026-05-19

## 2025-05-19 NOTE — TELEPHONE ENCOUNTER
I spoke to patient. She would like to try low dose lipitor before startingh a PCSK9 inhibitor or leqvio. I sent lipitor 10 mg to her pharmacy. She will also work with her PCP to find another cause of her fatigue. She knows to call for any concerns.

## 2025-06-03 ENCOUNTER — APPOINTMENT (OUTPATIENT)
Dept: PHARMACY | Facility: HOSPITAL | Age: 85
End: 2025-06-03
Payer: MEDICARE

## 2025-06-03 DIAGNOSIS — I25.10 CORONARY ARTERY DISEASE INVOLVING NATIVE CORONARY ARTERY OF NATIVE HEART WITHOUT ANGINA PECTORIS: ICD-10-CM

## 2025-06-03 NOTE — Clinical Note
Shanika is tolerating atorvastatin. Notes all side effects from rosuvastatin have subsided. Wants to continue with atorvastatin and ezetimibe at this time. Updated lipid panel was ordered and she was instructed to get lab drawn before appointment with Dr Espinosa in September.

## 2025-06-03 NOTE — PROGRESS NOTES
"  Pharmacist Clinic: Cardiology Management    Shanika Diaz is a 84 y.o. female was referred to Clinical Pharmacy Team for cholesterol management.     Referring Provider: Emilie Fuller APRN-CNP    THIS IS A NEW PATIENT APPOINTMENT. PATIENT WILL BE ESTABLISHING CARE WITH CLINICAL PHARMACY.    Appointment was completed by Shanika who was reached at primary number.    Allergies Reviewed? Yes    Allergies[1]    Medical History[2]    Medications Ordered Prior to Encounter[3]      RELEVANT LAB RESULTS:  Lab Results   Component Value Date    BILITOT 0.4 01/16/2025    CALCIUM 9.5 01/16/2025    CO2 23 01/16/2025     01/16/2025    CREATININE 1.18 (H) 01/16/2025    GLUCOSE 88 01/16/2025    ALKPHOS 90 01/16/2025    K 4.4 01/16/2025    PROT 7.0 01/16/2025     01/16/2025    AST 14 03/28/2025    ALT 14 03/28/2025    BUN 18 01/16/2025    ANIONGAP 14 01/16/2025    MG 2.34 01/16/2025    PHOS 4.2 09/23/2024    ALBUMIN 4.3 01/16/2025    LIPASE 18 01/16/2025    GFRF 46 (A) 04/14/2023     Lab Results   Component Value Date    TRIG 105 03/28/2025    CHOL 175 03/28/2025    LDLCALC 79 03/28/2025    HDL 77 03/28/2025     No results found for: \"BMCBC\", \"CBCDIF\"     PHARMACEUTICAL ASSESSMENT:    MEDICATION RECONCILIATION    Was a medication reconciliation completed at this visit? Yes  Home Pharmacy Reviewed? Yes, describe: [facility]    Added:  - none  Changed:  - Omeprazole --> using PRN  Removed:  - none    Drug Interactions? No    Medication Documentation Review Audit       Reviewed by PEGGY Castellanos (Nurse Practitioner) on 03/24/25 at 1414      Medication Order Taking? Sig Documenting Provider Last Dose Status   amLODIPine (Norvasc) 5 mg tablet [id number] Yes Take 1 tablet (5 mg) by mouth once daily. Johnathan Espinosa MD  Active   aspirin 81 mg EC tablet [id number] Yes Take 1 tablet (81 mg) by mouth once daily. PEGGY Castellanos  Active   B complex-vitamin C-folic acid (Nephro-Elsa Rx) 1- mg-mg-mcg tablet " [id number]  Take 1 tablet by mouth once daily with breakfast.   Patient not taking: Reported on 3/24/2025    Historical Provider, MD  Active   cholecalciferol (Vitamin D-3) 25 MCG (1000 UT) tablet [id number] Yes Take 1 tablet (25 mcg) by mouth once daily. Historical Provider, MD  Active   ezetimibe (Zetia) 10 mg tablet [id number] Yes Take 1 tablet (10 mg) by mouth once daily at bedtime. Johnathan Espinosa MD  Active   incobotulinumtoxinA (Xeomin) 50 Units recon soln [id number]  Inject into the shoulder, thigh, or buttocks every 3 months. Historical Provider, MD  Active   latanoprost (Xalatan) 0.005 % ophthalmic solution [id number] Yes Administer into affected eye(s). Historical Provider, MD  Active   metoprolol succinate XL (Toprol-XL) 25 mg 24 hr tablet [id number] Yes Take 1 tablet (25 mg) by mouth once daily. Emilie Fuller, APRN-CNP  Active   nitroglycerin (Nitrostat) 0.4 mg SL tablet [id number] Yes Place 1 tablet (0.4 mg) under the tongue every 5 minutes if needed. Historical Provider, MD  Active   omeprazole (PriLOSEC) 20 mg DR capsule [id number]  Take 1 capsule (20 mg) by mouth once daily. Do not crush or chew. Nikko Dean PA-C   02/15/25 3331   omeprazole (PriLOSEC) 40 mg DR capsule [id number] Yes Take 1 capsule (40 mg) by mouth 2 times a day. Do not crush or chew. Gregor Henriquez MD  Active   rosuvastatin (Crestor) 5 mg tablet [id number] Yes Take 1 tablet (5 mg) by mouth once daily. Johnathan Espinosa MD  Active                    DISEASE MANAGEMENT ASSESSMENT:     HYPERCHOLESTEROLEMIA ASSESSMENT    RECENT LIPID PANEL (DATE): 3/28/25  Lab Results   Component Value Date    TRIG 105 2025    CHOL 175 2025    LDLCALC 79 2025    HDL 77 2025          ASCVD SCORE: The ASCVD Risk score (Andrei DK, et al., 2019) failed to calculate for the following reasons:    The 2019 ASCVD risk score is only valid for ages 40 to 79  Coronary Heart Disease (MI, angina, coronary artery stenosis):  No   History of ischemic stroke? No   History of carotid artery stenosis? No   Peripheral artery disease? No   ASCVD RISK FACTORS:    CKD? Yes   Diabetes? No   HTN? Yes   Persistently elevated LDL? No   Elevated triglycerides? No   Inflammatory diseases (rheumatoid arthritis, psoriasis, HIV)? No    CURRENT PHARMACOTHERAPY  - Statin? Yes, describe: atorvastatin 10mg daily  - Ezetimibe? Yes, describe: 10mg daily  - PCSK9-I? No  - Other lipid lowering agents? No      RELEVANT PAST MEDICAL HISTORY:   Previously having myalgias with rosuvastatin    ASSESSMENT    Affordability/Accessibility: medications are affordable  Adherence/Organization: reports adherence  Adverse Effects: no current side effects  Recent Hospitalizations: No  Next Lipid Panel: due prior to next cardiology appointment.      EDUCATION/COUNSELING:  - Counseled patient on MOA, expectations, side effects, duration of therapy, contraindications, administration, and monitoring parameters  - Answered all patient questions and concerns      DISCUSSION/NOTES:   Patient reports adherence to atorvastatin at this time, and all side effects related to rosuvastatin have subsided.  No more muscle pain, troubles breathing, joint pain.  Discussed use of Repatha and Leqvio in place of atorvastatin. Patient will continue with current medication regimen at this time.  Lipid panel ordered will assess if any titrations are needed or if additional medication therapy will be needed.    ASSESSMENT:    Assessment/Plan   Problem List Items Addressed This Visit       CAD (coronary artery disease)    Tolerating current medication regimen. Will assess lipid panel at next cardiology appointment to assess if further management is needed.         Relevant Orders    Lipid panel         RECOMMENDATIONS/PLAN:    CONTINUE  Atorvastatin 10mg daily  Ezetimibe 10mg daily    Last Appnt with Referring Provider: 3/24/25  Next Appnt with Referring Provider: 9/22/25 -Dr Espinosa  Clinical  Pharmacist follow up: as needed by patient or provider request  VAF/Application Expiration: No  Type of Encounter: Magui Lloyd PharmD    Verbal consent to manage patient's drug therapy was obtained from the patient . They were informed they may decline to participate or withdraw from participation in pharmacy services at any time.    Continue all meds under the continuation of care with the referring provider and clinical pharmacy team.            [1]   Allergies  Allergen Reactions    Sulfa (Sulfonamide Antibiotics) Swelling, Rash and Angioedema    Codeine Hives    Penicillin Hives     Sever reaction ended up in ED    Trazodone Blurred vision   [2]   Past Medical History:  Diagnosis Date    Acute facial pain 03/08/2024    Atherosclerotic heart disease of native coronary artery without angina pectoris     Coronary artery disease without angina pectoris, unspecified vessel or lesion type, unspecified whether native or transplanted heart    Diverticulitis 03/20/2023    Dyspnea on exertion 03/08/2024    Gastritis 03/08/2024    Headache 03/08/2024    Heel pain 03/08/2024    Hypertension     Left foot pain 03/08/2024    Neck pain 03/08/2024    Otitis media 03/08/2024    Personal history of other diseases of the respiratory system 02/02/2019    History of sore throat    Personal history of other specified conditions     History of chest pain    Rash 03/08/2024    Shoulder pain 03/08/2024    Sprain of shoulder 03/08/2024    Urinary tract infection 03/08/2024   [3]   Current Outpatient Medications on File Prior to Visit   Medication Sig Dispense Refill    amLODIPine (Norvasc) 5 mg tablet Take 1 tablet (5 mg) by mouth once daily. 90 tablet 3    aspirin 81 mg EC tablet Take 1 tablet (81 mg) by mouth once daily. 90 tablet 3    atorvastatin (Lipitor) 10 mg tablet Take 1 tablet (10 mg) by mouth once daily. 30 tablet 11    cholecalciferol (Vitamin D-3) 25 MCG (1000 UT) tablet Take 1 tablet (25 mcg) by mouth once  daily.      ezetimibe (Zetia) 10 mg tablet Take 1 tablet (10 mg) by mouth once daily at bedtime. 90 tablet 3    incobotulinumtoxinA (Xeomin) 50 Units recon soln Inject into the shoulder, thigh, or buttocks every 3 months.      latanoprost (Xalatan) 0.005 % ophthalmic solution Administer into affected eye(s).      metoprolol succinate XL (Toprol-XL) 25 mg 24 hr tablet Take 1 tablet (25 mg) by mouth once daily. 90 tablet 3    nitroglycerin (Nitrostat) 0.4 mg SL tablet Place 1 tablet (0.4 mg) under the tongue every 5 minutes if needed.      omeprazole (PriLOSEC) 20 mg DR capsule Take 1 capsule (20 mg) by mouth once daily. Do not crush or chew. (Patient taking differently: Take 1 capsule (20 mg) by mouth once daily as needed. Do not crush or chew.) 30 capsule 0    B complex-vitamin C-folic acid (Nephro-Elsa Rx) 1- mg-mg-mcg tablet Take 1 tablet by mouth once daily with breakfast. (Patient not taking: Reported on 3/24/2025)      omeprazole (PriLOSEC) 40 mg DR capsule Take 1 capsule (40 mg) by mouth 2 times a day. Do not crush or chew. 60 capsule 1     No current facility-administered medications on file prior to visit.

## 2025-06-03 NOTE — ASSESSMENT & PLAN NOTE
Tolerating current medication regimen. Will assess lipid panel at next cardiology appointment to assess if further management is needed.

## 2025-06-10 ENCOUNTER — TELEPHONE (OUTPATIENT)
Dept: CARDIOLOGY | Facility: HOSPITAL | Age: 85
End: 2025-06-10

## 2025-06-10 DIAGNOSIS — I25.10 CORONARY ARTERY DISEASE INVOLVING NATIVE CORONARY ARTERY OF NATIVE HEART WITHOUT ANGINA PECTORIS: ICD-10-CM

## 2025-06-10 DIAGNOSIS — E78.2 COMBINED HYPERLIPIDEMIA: ICD-10-CM

## 2025-06-10 RX ORDER — ATORVASTATIN CALCIUM 10 MG/1
10 TABLET, FILM COATED ORAL DAILY
Qty: 90 TABLET | Refills: 3 | Status: SHIPPED | OUTPATIENT
Start: 2025-06-10 | End: 2026-06-10

## 2025-06-16 DIAGNOSIS — E78.2 MIXED HYPERLIPIDEMIA: Primary | ICD-10-CM

## 2025-06-18 ENCOUNTER — OFFICE VISIT (OUTPATIENT)
Dept: PRIMARY CARE | Facility: CLINIC | Age: 85
End: 2025-06-18
Payer: MEDICARE

## 2025-06-18 VITALS
HEART RATE: 83 BPM | SYSTOLIC BLOOD PRESSURE: 116 MMHG | OXYGEN SATURATION: 96 % | HEIGHT: 63 IN | TEMPERATURE: 97.8 F | DIASTOLIC BLOOD PRESSURE: 70 MMHG | BODY MASS INDEX: 34.38 KG/M2 | WEIGHT: 194 LBS

## 2025-06-18 DIAGNOSIS — R30.0 DYSURIA: ICD-10-CM

## 2025-06-18 DIAGNOSIS — N30.00 ACUTE CYSTITIS WITHOUT HEMATURIA: Primary | ICD-10-CM

## 2025-06-18 DIAGNOSIS — K57.32 DIVERTICULITIS OF LARGE INTESTINE WITHOUT PERFORATION OR ABSCESS WITHOUT BLEEDING: ICD-10-CM

## 2025-06-18 DIAGNOSIS — I10 BENIGN ESSENTIAL HYPERTENSION: ICD-10-CM

## 2025-06-18 LAB
POC APPEARANCE, URINE: CLEAR
POC BILIRUBIN, URINE: NEGATIVE
POC BLOOD, URINE: ABNORMAL
POC COLOR, URINE: YELLOW
POC GLUCOSE, URINE: NEGATIVE MG/DL
POC KETONES, URINE: NEGATIVE MG/DL
POC LEUKOCYTES, URINE: ABNORMAL
POC NITRITE,URINE: NEGATIVE
POC PH, URINE: 6 PH
POC PROTEIN, URINE: NEGATIVE MG/DL
POC SPECIFIC GRAVITY, URINE: 1.02
POC UROBILINOGEN, URINE: 0.2 EU/DL

## 2025-06-18 PROCEDURE — 1036F TOBACCO NON-USER: CPT | Performed by: FAMILY MEDICINE

## 2025-06-18 PROCEDURE — 1159F MED LIST DOCD IN RCRD: CPT | Performed by: FAMILY MEDICINE

## 2025-06-18 PROCEDURE — 99213 OFFICE O/P EST LOW 20 MIN: CPT | Performed by: FAMILY MEDICINE

## 2025-06-18 PROCEDURE — G2211 COMPLEX E/M VISIT ADD ON: HCPCS | Performed by: FAMILY MEDICINE

## 2025-06-18 PROCEDURE — 81003 URINALYSIS AUTO W/O SCOPE: CPT | Performed by: FAMILY MEDICINE

## 2025-06-18 PROCEDURE — 3074F SYST BP LT 130 MM HG: CPT | Performed by: FAMILY MEDICINE

## 2025-06-18 PROCEDURE — 3078F DIAST BP <80 MM HG: CPT | Performed by: FAMILY MEDICINE

## 2025-06-18 PROCEDURE — 1160F RVW MEDS BY RX/DR IN RCRD: CPT | Performed by: FAMILY MEDICINE

## 2025-06-18 RX ORDER — CIPROFLOXACIN 500 MG/1
500 TABLET, FILM COATED ORAL 2 TIMES DAILY
Qty: 14 TABLET | Refills: 0 | Status: SHIPPED | OUTPATIENT
Start: 2025-06-18 | End: 2025-06-25

## 2025-06-18 ASSESSMENT — ENCOUNTER SYMPTOMS
APPETITE CHANGE: 0
UNEXPECTED WEIGHT CHANGE: 0
NAUSEA: 0

## 2025-06-18 NOTE — PROGRESS NOTES
"Subjective   Patient ID: Shanika Diaz is a 84 y.o. female who presents for Sick Visit (Shanika is here today for same day sick visit with c/o dysuria and lower abdominal pain x 3 days. ).    HPI   no fever, back pain, N/V, rash  Patient says she is not sure and thinks this might be diverticulitis also    Review of Systems   Constitutional:  Negative for appetite change and unexpected weight change.   Eyes:  Negative for visual disturbance.   Gastrointestinal:  Negative for nausea.       Objective   /70 (BP Location: Left arm, Patient Position: Sitting)   Pulse 83   Temp 36.6 °C (97.8 °F) (Temporal)   Ht 1.6 m (5' 3\")   Wt 88 kg (194 lb)   SpO2 96%   BMI 34.37 kg/m²     Physical Exam  HENT:      Head: Normocephalic and atraumatic.      Nose: Nose normal.      Mouth/Throat:      Mouth: Mucous membranes are moist.      Pharynx: No oropharyngeal exudate.   Eyes:      Extraocular Movements: Extraocular movements intact.      Conjunctiva/sclera: Conjunctivae normal.      Pupils: Pupils are equal, round, and reactive to light.   Cardiovascular:      Rate and Rhythm: Normal rate and regular rhythm.   Pulmonary:      Effort: Pulmonary effort is normal.      Breath sounds: Normal breath sounds.   Abdominal:      General: There is no distension.      Palpations: Abdomen is soft.   Musculoskeletal:      Cervical back: Normal range of motion and neck supple.   Lymphadenopathy:      Cervical: No cervical adenopathy.   Neurological:      General: No focal deficit present.      Mental Status: She is alert.   Psychiatric:         Attention and Perception: Attention normal.         Speech: Speech normal.         Behavior: Behavior is cooperative.     Abdomen diffusely tender but more so on the left lower quadrant    Assessment/Plan   Problem List Items Addressed This Visit           ICD-10-CM    Benign essential hypertension I10    Controlled and monitor          Other Visit Diagnoses         Codes      Acute cystitis " without hematuria    -  Primary N30.00    Relevant Medications    ciprofloxacin (Cipro) 500 mg tablet      Dysuria     R30.0    Relevant Medications    ciprofloxacin (Cipro) 500 mg tablet    Other Relevant Orders    POCT UA Automated manually resulted (Completed)    Urine Culture      Diverticulitis of large intestine without perforation or abscess without bleeding     K57.32    Relevant Medications    ciprofloxacin (Cipro) 500 mg tablet        Risk-benefit discussed and add medication as directed  Liquid diet for a day or so until your abdomen is feeling better  If it is worse you should go to the emergency room  Recheck here in 1 week if not better sooner if worse   is present        [[id number]]

## 2025-06-20 ENCOUNTER — TELEPHONE (OUTPATIENT)
Dept: PRIMARY CARE | Facility: CLINIC | Age: 85
End: 2025-06-20
Payer: MEDICARE

## 2025-06-20 LAB — BACTERIA UR CULT: NORMAL

## 2025-06-20 NOTE — TELEPHONE ENCOUNTER
Result Communication    Resulted Orders   POCT UA Automated manually resulted   Result Value Ref Range    POC Color, Urine Yellow Straw, Yellow, Light-Yellow    POC Appearance, Urine Clear Clear    POC Glucose, Urine NEGATIVE NEGATIVE mg/dl    POC Bilirubin, Urine NEGATIVE NEGATIVE    POC Ketones, Urine NEGATIVE NEGATIVE mg/dl    POC Specific Gravity, Urine 1.020 1.005 - 1.035    POC Blood, Urine TRACE-Lysed (A) NEGATIVE    POC PH, Urine 6.0 No Reference Range Established PH    POC Protein, Urine NEGATIVE NEGATIVE mg/dl    POC Urobilinogen, Urine 0.2 0.2, 1.0 EU/DL    Poc Nitrite, Urine NEGATIVE NEGATIVE    POC Leukocytes, Urine TRACE (A) NEGATIVE   Urine Culture   Result Value Ref Range    CULTURE, URINE, ROUTINE SEE NOTE       Comment:          CULTURE, URINE, ROUTINE         Micro Number:      [id number]    Test Status:       Final    Specimen Source:   Urine, clean catch    Specimen Quality:  Adequate    Result:            Mixed genital estrada isolated. These superficial                       bacteria are not indicative of a urinary tract                       infection. No further organism identification is                       warranted on this specimen. If clinically                       indicated, recollect clean-catch, mid-stream                       urine and transfer immediately to Urine Culture                       Transport Tube.         3:06 PM  MD SH[place] [id number] Clinical Support Staff  No significant growth    Results were successfully communicated with the patient and they acknowledged their understanding.

## 2025-06-20 NOTE — TELEPHONE ENCOUNTER
----- Message from Rodríguez Beth sent at 6/20/2025  1:50 PM EDT -----  No significant growth  ----- Message -----  From: Diana Marshall MA  Sent: 6/18/2025   3:23 PM EDT  To: Rodríguez Beth MD

## 2025-06-23 ENCOUNTER — APPOINTMENT (OUTPATIENT)
Dept: PRIMARY CARE | Facility: CLINIC | Age: 85
End: 2025-06-23
Payer: MEDICARE

## 2025-09-04 DIAGNOSIS — N18.31 STAGE 3A CHRONIC KIDNEY DISEASE (MULTI): ICD-10-CM

## 2025-09-04 DIAGNOSIS — N18.32 CHRONIC KIDNEY DISEASE, STAGE 3B (MULTI): ICD-10-CM

## 2025-09-04 DIAGNOSIS — I10 BENIGN ESSENTIAL HYPERTENSION: ICD-10-CM

## 2025-09-04 DIAGNOSIS — N17.9 ACUTE KIDNEY INJURY: ICD-10-CM

## 2025-09-05 PROBLEM — N17.9 ACUTE KIDNEY INJURY: Status: RESOLVED | Noted: 2024-01-08 | Resolved: 2025-09-05

## 2025-09-05 LAB
ALT SERPL-CCNC: 26 U/L (ref 6–29)
AST SERPL-CCNC: 19 U/L (ref 10–35)
CHOLEST SERPL-MCNC: 161 MG/DL
CHOLEST/HDLC SERPL: 2.3 (CALC)
HDLC SERPL-MCNC: 71 MG/DL
LDLC SERPL CALC-MCNC: 70 MG/DL (CALC)
NONHDLC SERPL-MCNC: 90 MG/DL (CALC)
TRIGL SERPL-MCNC: 110 MG/DL

## 2025-09-06 LAB
25(OH)D3+25(OH)D2 SERPL-MCNC: 41 NG/ML (ref 30–100)
ALBUMIN SERPL-MCNC: 4.4 G/DL (ref 3.6–5.1)
ALBUMIN/CREAT UR: NORMAL
BUN SERPL-MCNC: 16 MG/DL (ref 7–25)
BUN/CREAT SERPL: 15 (CALC) (ref 6–22)
CALCIUM SERPL-MCNC: 9.8 MG/DL (ref 8.6–10.4)
CHLORIDE SERPL-SCNC: 106 MMOL/L (ref 98–110)
CO2 SERPL-SCNC: 24 MMOL/L (ref 20–32)
CREAT SERPL-MCNC: 1.08 MG/DL (ref 0.6–0.95)
CREAT UR-MCNC: NORMAL MG/DL
EGFRCR SERPLBLD CKD-EPI 2021: 50 ML/MIN/1.73M2
ERYTHROCYTE [DISTWIDTH] IN BLOOD BY AUTOMATED COUNT: 12.8 % (ref 11–15)
FERRITIN SERPL-MCNC: 55 NG/ML (ref 16–288)
GLUCOSE SERPL-MCNC: 100 MG/DL (ref 65–99)
HCT VFR BLD AUTO: 37.9 % (ref 35–45)
HGB BLD-MCNC: 12.5 G/DL (ref 11.7–15.5)
IRON SATN MFR SERPL: 26 % (CALC) (ref 16–45)
IRON SERPL-MCNC: 104 MCG/DL (ref 45–160)
MCH RBC QN AUTO: 29.4 PG (ref 27–33)
MCHC RBC AUTO-ENTMCNC: 33 G/DL (ref 32–36)
MCV RBC AUTO: 89.2 FL (ref 80–100)
MICROALBUMIN UR-MCNC: NORMAL
PHOSPHATE SERPL-MCNC: 4.4 MG/DL (ref 2.1–4.3)
PLATELET # BLD AUTO: 314 THOUSAND/UL (ref 140–400)
PMV BLD REES-ECKER: 11.8 FL (ref 7.5–12.5)
POTASSIUM SERPL-SCNC: 4.8 MMOL/L (ref 3.5–5.3)
PTH-INTACT SERPL-MCNC: 69 PG/ML (ref 16–77)
RBC # BLD AUTO: 4.25 MILLION/UL (ref 3.8–5.1)
SODIUM SERPL-SCNC: 140 MMOL/L (ref 135–146)
TIBC SERPL-MCNC: 393 MCG/DL (CALC) (ref 250–450)
WBC # BLD AUTO: 4.9 THOUSAND/UL (ref 3.8–10.8)

## 2025-09-10 ENCOUNTER — APPOINTMENT (OUTPATIENT)
Dept: PRIMARY CARE | Facility: CLINIC | Age: 85
End: 2025-09-10
Payer: MEDICARE

## 2025-09-22 ENCOUNTER — APPOINTMENT (OUTPATIENT)
Dept: CARDIOLOGY | Facility: CLINIC | Age: 85
End: 2025-09-22
Payer: MEDICARE

## 2025-09-30 ENCOUNTER — APPOINTMENT (OUTPATIENT)
Dept: NEPHROLOGY | Facility: CLINIC | Age: 85
End: 2025-09-30
Payer: MEDICARE